# Patient Record
Sex: FEMALE | Race: WHITE | NOT HISPANIC OR LATINO | Employment: OTHER | URBAN - METROPOLITAN AREA
[De-identification: names, ages, dates, MRNs, and addresses within clinical notes are randomized per-mention and may not be internally consistent; named-entity substitution may affect disease eponyms.]

---

## 2017-05-11 ENCOUNTER — HOSPITAL ENCOUNTER (OUTPATIENT)
Dept: CARDIOLOGY | Facility: CLINIC | Age: 57
Discharge: HOME | End: 2017-05-11
Attending: INTERNAL MEDICINE | Admitting: INTERNAL MEDICINE
Payer: COMMERCIAL

## 2017-05-11 DIAGNOSIS — I35.1 AORTIC REGURGITATION: ICD-10-CM

## 2017-05-11 PROCEDURE — 93306 TTE W/DOPPLER COMPLETE: CPT | Mod: 26 | Performed by: INTERNAL MEDICINE

## 2017-05-11 PROCEDURE — 93306 TTE W/DOPPLER COMPLETE: CPT

## 2017-05-18 ENCOUNTER — OFFICE VISIT (OUTPATIENT)
Dept: CARDIOLOGY | Facility: CLINIC | Age: 57
End: 2017-05-18
Attending: INTERNAL MEDICINE
Payer: COMMERCIAL

## 2017-05-18 VITALS
SYSTOLIC BLOOD PRESSURE: 118 MMHG | WEIGHT: 176 LBS | HEART RATE: 72 BPM | BODY MASS INDEX: 28.28 KG/M2 | DIASTOLIC BLOOD PRESSURE: 62 MMHG | HEIGHT: 66 IN

## 2017-05-18 DIAGNOSIS — I35.1 AORTIC VALVE INSUFFICIENCY, UNSPECIFIED ETIOLOGY: ICD-10-CM

## 2017-05-18 PROCEDURE — 99213 OFFICE O/P EST LOW 20 MIN: CPT | Performed by: INTERNAL MEDICINE

## 2017-05-18 NOTE — PROGRESS NOTES
HISTORY OF PRESENT ILLNESS:  Ms. Ritter is a very pleasant 56-year-old female who came to see me in 2015 with aortic valve disease.  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  She also has mild to moderate ascending aortic dilatation.  She continues to remain completely symptom-free.  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  She has no chest pain, chest pressure, shortness of breath.  She feels great with no limitations.  She does realize she has put on some weight and the plan is that now in summer time and [employer], to get into more by walking and biking.      She denies any dyspnea, palpitations, PND, orthopnea or pedal edema.      Review of her echocardiogram shows normal left ventricular size and function and stable 2+ aortic regurgitation.      ASSESSMENT AND PLAN:  It is a delight seeing Ms. Ritter back in clinic appearing stable from a cardiovascular standpoint.  She has not progressed with regards to her symptoms or her echocardiographic findings.  I will back off frequency of visits to every 2 years and we will see her back at that time with a repeat echocardiogram.         RENNY LOONEY MD [facility]             D: 2017 08:54   T: 2017 17:08   MT: [id number]      Name:     CADE RITTER   MRN:      [id number]        Account:      [id number]   :      1960           Service Date: 2017      Document: [id number]

## 2017-05-18 NOTE — LETTER
5/18/2017    [facility]   [address]  [place] [address]    RE: Diane Ritter       Dear Colleague,    I had the pleasure of seeing Diane Ritter in the [facility].    Ms. Ritter is a very pleasant 56-year-old female who came to see me in 2015 with aortic valve disease.  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  She also has mild to moderate ascending aortic dilatation.  She continues to remain completely symptom-free.  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  She has no chest pain, chest pressure, shortness of breath.  She feels great with no limitations.  She does realize she has put on some weight and the plan is that now in summer time and jail, to get into more by walking and biking.      She denies any dyspnea, palpitations, PND, orthopnea or pedal edema.      Review of her echocardiogram shows normal left ventricular size and function and stable 2+ aortic regurgitation.     Outpatient Encounter Prescriptions as of 5/18/2017   Medication Sig Dispense Refill     Cholecalciferol (VITAMIN D) 1000 UNITS capsule Take 1 capsule by mouth daily       aspirin 81 MG tablet Take by mouth daily       EPINEPHrine (EPIPEN) 0.3 MG/0.3ML injection Inject 0.3 mg into the muscle once as needed for anaphylaxis       lisinopril (PRINIVIL,ZESTRIL) 10 MG tablet Take 10 mg by mouth daily.       FISH OIL        Multiple Vitamin (DAILY MULTIVITAMIN PO) Take by mouth daily        No facility-administered encounter medications on file as of 5/18/2017.       ASSESSMENT AND PLAN:  It is a delight seeing Ms. Ritter back in clinic appearing stable from a cardiovascular standpoint.  She has not progressed with regards to her symptoms or her echocardiographic findings.  I will back off frequency of visits to every 2 years and we will see her back at that time with a  repeat echocardiogram.     Again, thank you for allowing me to participate in the care of your patient.      Sincerely,    RENNY LOONEY MD     [facility]

## 2017-05-18 NOTE — MR AVS SNAPSHOT
After Visit Summary   5/18/2017    Diane Ritter    MRN: [id number]           Patient Information     Date Of Birth          1960        Visit Information        Provider Department      5/18/2017 8:45 AM Jasmeet John MD [facility]        Today's Diagnoses     Aortic valve insufficiency, unspecified etiology           Follow-ups after your visit        Additional Services     Follow-Up with Cardiologist                 Future tests that were ordered for you today     Open Future Orders        Priority Expected Expires Ordered    Follow-Up with Cardiologist Routine 5/18/2019 6/7/2019 5/18/2017    Echocardiogram Routine 5/18/2019 6/7/2019 5/18/2017            Who to contact     If you have questions or need follow up information about today's clinic visit or your schedule please contact [facility] directly at [phone].  Normal or non-critical lab and imaging results will be communicated to you by MyChart, letter or phone within 4 business days after the clinic has received the results. If you do not hear from us within 7 days, please contact the clinic through [email or web address] or phone. If you have a critical or abnormal lab result, we will notify you by phone as soon as possible.  Submit refill requests through [employer] or call your pharmacy and they will forward the refill request to us. Please allow 3 business days for your refill to be completed.          Additional Information About Your Visit        MyChart Information     [employer] gives you secure access to your electronic health record. If you see a primary care provider, you can also send messages to your care team and make appointments. If you have questions, please call your primary care clinic.  If you do not have a primary care provider, please call [phone] and they will assist you.        Care EveryWhere ID     This is your Care EveryWhere  "ID. This could be used by other organizations to access your [place] medical records  [id number]        Your Vitals Were     Pulse Height BMI (Body Mass Index)             72 1.676 m (5' 6\") 28.41 kg/m2          Blood Pressure from Last 3 Encounters:   05/18/17 118/62   05/31/15 113/78   04/10/15 101/65    Weight from Last 3 Encounters:   05/18/17 79.8 kg (176 lb)   04/10/15 74.4 kg (164 lb)   03/27/15 75.6 kg (166 lb 11.2 oz)              We Performed the Following     Follow-Up with Cardiologist        Primary Care Provider Office Phone # Fax #    Erlinda Mcclain [phone]       [address] [address]        Thank you!     Thank you for choosing [facility] [place]  for your care. Our goal is always to provide you with excellent care. Hearing back from our patients is one way we can continue to improve our services. Please take a few minutes to complete the written survey that you may receive in the mail after your visit with us. Thank you!             Your Updated Medication List - Protect others around you: Learn how to safely use, store and throw away your medicines at www.disposemymeds.org.          This list is accurate as of: 5/18/17  8:54 AM.  Always use your most recent med list.                   Brand Name Dispense Instructions for use    aspirin 81 MG tablet      Take by mouth daily       DAILY MULTIVITAMIN PO      Take by mouth daily       EPINEPHrine 0.3 MG/0.3ML injection      Inject 0.3 mg into the muscle once as needed for anaphylaxis       FISH OIL          lisinopril 10 MG tablet    PRINIVIL/ZESTRIL     Take 10 mg by mouth daily.       vitamin D 1000 UNITS capsule      Take 1 capsule by mouth daily         "

## 2017-05-18 NOTE — PROGRESS NOTES
HPI and Plan:   See dictation    Orders Placed This Encounter   Procedures     Follow-Up with Cardiologist     Echocardiogram     No orders of the defined types were placed in this encounter.    There are no discontinued medications.      Encounter Diagnosis   Name Primary?     Aortic valve insufficiency, unspecified etiology        CURRENT MEDICATIONS:  Current Outpatient Prescriptions   Medication Sig Dispense Refill     Cholecalciferol (VITAMIN D) 1000 UNITS capsule Take 1 capsule by mouth daily       aspirin 81 MG tablet Take by mouth daily       EPINEPHrine (EPIPEN) 0.3 MG/0.3ML injection Inject 0.3 mg into the muscle once as needed for anaphylaxis       lisinopril (PRINIVIL,ZESTRIL) 10 MG tablet Take 10 mg by mouth daily.       FISH OIL        Multiple Vitamin (DAILY MULTIVITAMIN PO) Take by mouth daily          ALLERGIES     Allergies   Allergen Reactions     Compazine      Shellfish Allergy      Sulfa Drugs        PAST MEDICAL HISTORY:  Past Medical History:   Diagnosis Date     Aortic valve disorder-2-3 central aortic regugitation 3/26/2015     BHUMIKA (obstructive sleep apnea) 3/26/2015       PAST SURGICAL HISTORY:  Past Surgical History:   Procedure Laterality Date     APPENDECTOMY       GYN SURGERY      ovarian cysts removed      HYSTERECTOMY         FAMILY HISTORY:  Family History   Problem Relation Age of Onset     HEART DISEASE Mother      mitral valve disorder??     Rheumatoid Arthritis Mother      Other - See Comments Father      abdominal aortic aneurysm     Hypertension Father      Hypertension Brother      DIABETES Brother      DIABETES Brother        SOCIAL HISTORY:  Social History     Social History     Marital status:      Spouse name: N/A     Number of children: N/A     Years of education: N/A     Social History Main Topics     Smoking status: Never Smoker     Smokeless tobacco: None     Alcohol use No     Drug use: None     Sexual activity: Not Asked     Other Topics Concern     Caffeine  "Concern No     Sleep Concern Yes     sleep apnea, wears oral device     Stress Concern No     Weight Concern Yes     weight loss 15# over last year     Special Diet Yes     low sugar, no processed foods, juicing     Exercise Yes     3 days week, biking, elliptical     Social History Narrative       Review of Systems:  Skin:  Negative     Eyes:  Positive for glasses  ENT:  Negative    Respiratory:  Positive for sleep apnea  Cardiovascular:  Negative;chest pain;lightheadedness;dizziness;syncope or near-syncope;cyanosis;exercise intolerance;fatigue Positive for;palpitations  Gastroenterology: Negative    Genitourinary:  Negative    Musculoskeletal:  Positive for back pain  Neurologic:  Positive for numbness or tingling of feet  Psychiatric:  Negative    Heme/Lymph/Imm:  Positive for    Endocrine:  Negative      Physical Exam:  Vitals: /62  Pulse 72  Ht 1.676 m (5' 6\")  Wt 79.8 kg (176 lb)  BMI 28.41 kg/m2    Constitutional:           Skin:           Head:           Eyes:           ENT:           Neck:           Chest:           Cardiac:                    Abdomen:           Vascular:                                        Extremities and Back:           Neurological:             Recent Lab Results:  LIPID RESULTS:  Lab Results   Component Value Date    CHOL 186 12/04/2008    HDL 60 12/04/2008     12/04/2008    TRIG 54 12/04/2008    CHOLHDLRATIO 3.1 12/04/2008       LIVER ENZYME RESULTS:  No results found for: AST, ALT    CBC RESULTS:  Lab Results   Component Value Date    WBC 3.6 (L) 12/04/2008    RBC 3.95 12/04/2008    HGB 11.9 12/04/2008    HCT 36.1 12/04/2008    MCV 91 12/04/2008    MCH 30.1 12/04/2008    MCHC 33.0 12/04/2008    RDW 12.9 12/04/2008     12/04/2008       BMP RESULTS:  Lab Results   Component Value Date     12/04/2008    POTASSIUM 4.2 12/04/2008    CHLORIDE 106 12/04/2008    CO2 27 12/04/2008    ANIONGAP 8 12/04/2008    GLC 85 12/04/2008    BUN 19 12/04/2008    CR 0.86 " 12/04/2008    GFRESTIMATED 70 12/04/2008    GFRESTBLACK 85 12/04/2008    GRACE 9.9 12/04/2008        A1C RESULTS:  No results found for: A1C    INR RESULTS:  No results found for: INR        CC  Jasmeet John MD   [facility]  [address]  [place] [id number]

## 2017-07-01 ENCOUNTER — HEALTH MAINTENANCE LETTER (OUTPATIENT)
Age: 57
End: 2017-07-01

## 2017-08-03 ENCOUNTER — TRANSFERRED RECORDS (OUTPATIENT)
Dept: HEALTH INFORMATION MANAGEMENT | Facility: CLINIC | Age: 57
End: 2017-08-03

## 2017-09-08 ENCOUNTER — HOSPITAL ENCOUNTER (OUTPATIENT)
Dept: MAMMOGRAPHY | Facility: CLINIC | Age: 57
End: 2017-09-08
Attending: INTERNAL MEDICINE
Payer: COMMERCIAL

## 2017-09-08 ENCOUNTER — HOSPITAL ENCOUNTER (OUTPATIENT)
Dept: ULTRASOUND IMAGING | Facility: CLINIC | Age: 57
Discharge: HOME | End: 2017-09-08
Attending: INTERNAL MEDICINE | Admitting: INTERNAL MEDICINE
Payer: COMMERCIAL

## 2017-09-08 DIAGNOSIS — Z12.31 VISIT FOR SCREENING MAMMOGRAM: ICD-10-CM

## 2017-09-08 DIAGNOSIS — E04.1 COLLOID THYROID NODULE: ICD-10-CM

## 2017-09-08 PROCEDURE — 77063 BREAST TOMOSYNTHESIS BI: CPT

## 2017-09-08 PROCEDURE — 76536 US EXAM OF HEAD AND NECK: CPT

## 2018-09-06 ENCOUNTER — TELEPHONE (OUTPATIENT)
Dept: FAMILY MEDICINE | Facility: CLINIC | Age: 58
End: 2018-09-06

## 2018-09-06 NOTE — TELEPHONE ENCOUNTER
Reason for Call:  Other appointment    Detailed comments: Pt's  sees dr Hernandez and she is wondering if Dr. Marcano could be her PCP so they both have their primary care in the same clinic. She would be very grateful    Phone Number Patient can be reached at: Cell number on file:    Telephone Information:   Mobile [phone]       Best Time: Any time  Can we leave a detailed message on this number? YES    Call taken on 9/6/2018 at 2:07 PM by Skyla Mota

## 2018-09-17 ENCOUNTER — TELEPHONE (OUTPATIENT)
Dept: FAMILY MEDICINE | Facility: CLINIC | Age: 58
End: 2018-09-17

## 2018-09-17 NOTE — TELEPHONE ENCOUNTER
Placed call to patient in regards to appointment scheduling with new PCP okayed by Dr. Hernandez. Patient scheduled on October 8, 2018 at 1330 with Dr. Hernandez.    Glenn Andersen CMA on 9/17/2018 at 5:39 PM

## 2018-09-17 NOTE — TELEPHONE ENCOUNTER
PT is trying to become a Dr. Hernandez pt.  Her , Cleveland Ritter is a pt of Dr. Blum.  The couple would like to see the same pcp.  At Sanjeev's last appointment, she was told by Dr. Hernandez that he would take her on, however there is no confirmation.  Please confirm so that pt can make an appt as soon as possible.      Diane can be reached at [phone] and it is ok to leave .

## 2018-09-21 ENCOUNTER — HOSPITAL ENCOUNTER (OUTPATIENT)
Dept: MAMMOGRAPHY | Facility: CLINIC | Age: 58
Discharge: HOME | End: 2018-09-21
Attending: INTERNAL MEDICINE | Admitting: INTERNAL MEDICINE
Payer: COMMERCIAL

## 2018-09-21 ENCOUNTER — OFFICE VISIT (OUTPATIENT)
Dept: FAMILY MEDICINE | Facility: CLINIC | Age: 58
End: 2018-09-21
Payer: COMMERCIAL

## 2018-09-21 ENCOUNTER — TELEPHONE (OUTPATIENT)
Dept: FAMILY MEDICINE | Facility: CLINIC | Age: 58
End: 2018-09-21

## 2018-09-21 VITALS
SYSTOLIC BLOOD PRESSURE: 129 MMHG | OXYGEN SATURATION: 99 % | DIASTOLIC BLOOD PRESSURE: 75 MMHG | HEIGHT: 66 IN | TEMPERATURE: 97.7 F | HEART RATE: 87 BPM | WEIGHT: 151 LBS | BODY MASS INDEX: 24.27 KG/M2

## 2018-09-21 DIAGNOSIS — Z13.220 LIPID SCREENING: ICD-10-CM

## 2018-09-21 DIAGNOSIS — Z85.41 HISTORY OF CERVICAL CANCER: ICD-10-CM

## 2018-09-21 DIAGNOSIS — Z00.00 ROUTINE HISTORY AND PHYSICAL EXAMINATION OF ADULT: Primary | ICD-10-CM

## 2018-09-21 DIAGNOSIS — Z23 NEED FOR PROPHYLACTIC VACCINATION AND INOCULATION AGAINST INFLUENZA: ICD-10-CM

## 2018-09-21 DIAGNOSIS — L71.9 ROSACEA: ICD-10-CM

## 2018-09-21 DIAGNOSIS — Z86.0100 HISTORY OF COLONIC POLYPS: ICD-10-CM

## 2018-09-21 DIAGNOSIS — Z12.31 VISIT FOR SCREENING MAMMOGRAM: ICD-10-CM

## 2018-09-21 DIAGNOSIS — Z13.29 SCREENING FOR THYROID DISORDER: ICD-10-CM

## 2018-09-21 DIAGNOSIS — Z86.19 HISTORY OF ESBL E. COLI INFECTION: ICD-10-CM

## 2018-09-21 DIAGNOSIS — E04.1 THYROID NODULE: ICD-10-CM

## 2018-09-21 DIAGNOSIS — I35.1 NONRHEUMATIC AORTIC VALVE INSUFFICIENCY: ICD-10-CM

## 2018-09-21 DIAGNOSIS — K76.9 LIVER LESION: ICD-10-CM

## 2018-09-21 DIAGNOSIS — I87.2 VENOUS (PERIPHERAL) INSUFFICIENCY: ICD-10-CM

## 2018-09-21 DIAGNOSIS — Z79.899 MEDICATION MANAGEMENT: ICD-10-CM

## 2018-09-21 LAB
ALBUMIN SERPL-MCNC: 4.1 G/DL (ref 3.4–5)
ALP SERPL-CCNC: 62 U/L (ref 40–150)
ALT SERPL W P-5'-P-CCNC: 38 U/L (ref 0–50)
ANION GAP SERPL CALCULATED.3IONS-SCNC: 8 MMOL/L (ref 3–14)
AST SERPL W P-5'-P-CCNC: 27 U/L (ref 0–45)
BILIRUB SERPL-MCNC: 0.6 MG/DL (ref 0.2–1.3)
BUN SERPL-MCNC: 17 MG/DL (ref 7–30)
CALCIUM SERPL-MCNC: 10 MG/DL (ref 8.5–10.1)
CHLORIDE SERPL-SCNC: 98 MMOL/L (ref 94–109)
CHOLEST SERPL-MCNC: 167 MG/DL
CO2 SERPL-SCNC: 30 MMOL/L (ref 20–32)
CREAT SERPL-MCNC: 0.74 MG/DL (ref 0.52–1.04)
GFR SERPL CREATININE-BSD FRML MDRD: 80 ML/MIN/1.7M2
GLUCOSE SERPL-MCNC: 93 MG/DL (ref 70–99)
HDLC SERPL-MCNC: 63 MG/DL
LDLC SERPL CALC-MCNC: 92 MG/DL
NONHDLC SERPL-MCNC: 104 MG/DL
POTASSIUM SERPL-SCNC: 4.7 MMOL/L (ref 3.4–5.3)
PROT SERPL-MCNC: 7.7 G/DL (ref 6.8–8.8)
SODIUM SERPL-SCNC: 136 MMOL/L (ref 133–144)
T4 FREE SERPL-MCNC: 0.98 NG/DL (ref 0.76–1.46)
TRIGL SERPL-MCNC: 62 MG/DL
TSH SERPL DL<=0.005 MIU/L-ACNC: 4.46 MU/L (ref 0.4–4)

## 2018-09-21 PROCEDURE — 99386 PREV VISIT NEW AGE 40-64: CPT | Mod: 25 | Performed by: INTERNAL MEDICINE

## 2018-09-21 PROCEDURE — 77067 SCR MAMMO BI INCL CAD: CPT

## 2018-09-21 PROCEDURE — 87624 HPV HI-RISK TYP POOLED RSLT: CPT | Performed by: INTERNAL MEDICINE

## 2018-09-21 PROCEDURE — 90471 IMMUNIZATION ADMIN: CPT | Performed by: INTERNAL MEDICINE

## 2018-09-21 PROCEDURE — 80053 COMPREHEN METABOLIC PANEL: CPT | Performed by: INTERNAL MEDICINE

## 2018-09-21 PROCEDURE — G0145 SCR C/V CYTO,THINLAYER,RESCR: HCPCS | Performed by: INTERNAL MEDICINE

## 2018-09-21 PROCEDURE — 84439 ASSAY OF FREE THYROXINE: CPT | Performed by: INTERNAL MEDICINE

## 2018-09-21 PROCEDURE — 99213 OFFICE O/P EST LOW 20 MIN: CPT | Mod: 25 | Performed by: INTERNAL MEDICINE

## 2018-09-21 PROCEDURE — 90686 IIV4 VACC NO PRSV 0.5 ML IM: CPT | Performed by: INTERNAL MEDICINE

## 2018-09-21 PROCEDURE — 84443 ASSAY THYROID STIM HORMONE: CPT | Performed by: INTERNAL MEDICINE

## 2018-09-21 PROCEDURE — 36415 COLL VENOUS BLD VENIPUNCTURE: CPT | Performed by: INTERNAL MEDICINE

## 2018-09-21 PROCEDURE — 80061 LIPID PANEL: CPT | Performed by: INTERNAL MEDICINE

## 2018-09-21 RX ORDER — LISINOPRIL 10 MG/1
10 TABLET ORAL DAILY
Qty: 90 TABLET | Refills: 3 | Status: SHIPPED | OUTPATIENT
Start: 2018-09-21 | End: 2019-09-02

## 2018-09-21 NOTE — TELEPHONE ENCOUNTER
Patient will be calling back to notify us which express script to send her prescriptions to.   Serena Gore CMA

## 2018-09-21 NOTE — Clinical Note
Colonoscopy was done on 11/30/2015 Polyps were found and repeat colonoscopy in 5 year was recommended at that time

## 2018-09-21 NOTE — Clinical Note
Please make correction in previous note Colonoscopy was normal  But due to history polyp in the past it was recommended to repeat in 5 years

## 2018-09-21 NOTE — PROGRESS NOTES
SUBJECTIVE:   CC: Diane Ritter is an 58 year old woman who presents for preventive health visit.     Healthy Habits:    Do you get at least three servings of calcium containing foods daily (dairy, green leafy vegetables, etc.)? yes    Amount of exercise or daily activities, outside of work: 5 day(s) per week    Problems taking medications regularly No    Medication side effects: No    Have you had an eye exam in the past two years? yes    Do you see a dentist twice per year? Yes    Do you have sleep apnea, excessive snoring or daytime drowsiness?yes, uses Cpap     Today's PHQ-2 Score:   PHQ-2 ( 1999 Pf[employer]) 9/21/2018   Q1: Little interest or pleasure in doing things 0   Q2: Feeling down, depressed or hopeless 0   PHQ-2 Score 0     Abuse: Current or Past(Physical, Sexual or Emotional)- No  Do you feel safe in your environment - Yes    Social History   Substance Use Topics     Smoking status: Never Smoker     Smokeless tobacco: Never Used     Alcohol use No     If you drink alcohol do you typically have >3 drinks per day or >7 drinks per week? No                     Reviewed orders with patient.  Reviewed health maintenance and updated orders accordingly - Yes  Labs reviewed in EPIC  Patient Active Problem List   Diagnosis     Aortic valve disorder-2-3 central aortic regugitation     BHUMIKA (obstructive sleep apnea)     Thyroid nodule     Nonrheumatic aortic valve insufficiency     Liver lesion     Rosacea     History of ESBL E. coli infection     Venous (peripheral) insufficiency     History of colonic polyps     Past Surgical History:   Procedure Laterality Date     APPENDECTOMY       GYN SURGERY      ovarian cysts removed      HYSTERECTOMY VAGINAL      cervical cancer     ORTHOPEDIC SURGERY  2010    hand surgery on right side       Social History   Substance Use Topics     Smoking status: Never Smoker     Smokeless tobacco: Never Used     Alcohol use No     Family History   Problem Relation Age of Onset      HEART DISEASE Mother      mitral valve disorder??     Rheumatoid Arthritis Mother      Other - See Comments Father      abdominal aortic aneurysm     Hypertension Father      Colon Polyps Father      Diabetes Brother      Pancreatic Cancer Brother      Diabetes Brother      Hypertension Brother      Breast Cancer No family hx of          Current Outpatient Prescriptions   Medication Sig Dispense Refill     aspirin 81 MG tablet Take by mouth every other day        Cholecalciferol (VITAMIN D) 1000 UNITS capsule Take 1 capsule by mouth daily In winter       EPINEPHrine (EPIPEN) 0.3 MG/0.3ML injection Inject 0.3 mg into the muscle once as needed for anaphylaxis       FISH OIL 1,000 mg daily        lisinopril (PRINIVIL,ZESTRIL) 10 MG tablet Take 10 mg by mouth daily.       Multiple Vitamin (DAILY MULTIVITAMIN PO) Take by mouth daily        Allergies   Allergen Reactions     Compazine Other (See Comments)     Facial tics     Shellfish Allergy Nausea and Vomiting and Rash     Sulfa Drugs Rash     Patient over age 50, mutual decision to screen reflected in health maintenance.    Pertinent mammograms are reviewed under the imaging tab.  History of abnormal Pap smear: NO - age 30-65 PAP every 3 years  PAP / HPV 11/13/2009   PAP NIL     Reviewed and updated as needed this visit by clinical staff  Tobacco  Allergies  Meds  Soc Hx      Reviewed and updated as needed this visit by Provider     ROS:  CONSTITUTIONAL: NEGATIVE for fever, chills, change in weight  INTEGUMENTARY/SKIN: NEGATIVE for worrisome rashes, moles or lesions  EYES: NEGATIVE for vision changes or irritation  ENT: NEGATIVE for ear, mouth and throat problems  RESP: NEGATIVE for significant cough or SOB  BREAST: NEGATIVE for masses, tenderness or discharge  CV: NEGATIVE for chest pain, palpitations or peripheral edema  GI: NEGATIVE for nausea, abdominal pain, heartburn, or change in bowel habits  : NEGATIVE for unusual urinary or vaginal symptoms. No vaginal  "bleeding.  MUSCULOSKELETAL: NEGATIVE for significant arthralgias or myalgia  NEURO: NEGATIVE for weakness, dizziness or paresthesias  PSYCHIATRIC: NEGATIVE for changes in mood or affect     This document serves as a record of the services and decisions personally performed and made by Angelica Marcano MD. It was created on her behalf by Marcia Mark, a trained medical scribe. The creation of this document is based on the provider's statements to the medical scribe.  Marcia Mark 10:25 AM September 21, 2018  OBJECTIVE:   /75 (Cuff Size: Adult Regular)  Pulse 87  Temp 97.7  F (36.5  C) (Oral)  Ht 1.664 m (5' 5.5\")  Wt 68.5 kg (151 lb)  SpO2 99%  Breastfeeding? No  BMI 24.75 kg/m2     GENERAL APPEARANCE: healthy, alert and no distress  EYES: Eyes grossly normal to inspection, PERRL and conjunctivae and sclerae normal  HENT: ear canals and TM's normal, nose and mouth without ulcers or lesions, oropharynx clear and oral mucous membranes moist  NECK: no adenopathy, no asymmetry, masses, or scars and thyroid normal to palpation  RESP: lungs clear to auscultation - no rales, rhonchi or wheezes  BREAST: normal without masses, tenderness or nipple discharge and no palpable axillary masses or adenopathy  CV: Faint systolic murmur in aortic area, regular rate and rhythm, normal S1 S2, no S3 or S4, no click or rub,slight  peripheral edema and peripheral pulses strong  ABDOMEN: soft, nontender, no hepatosplenomegaly, no masses and bowel sounds normal   (female): PAP smear done in office today, normal female external genitalia, normal urethral meatus, vaginal mucosal atrophy noted,    Pap test performed.  MS: no musculoskeletal defects are noted and gait is age appropriate without ataxia  SKIN: scar in RL quadrant from oophorectomy, no suspicious lesions or rashes  NEURO: Normal strength and tone, sensory exam grossly normal, mentation intact and speech normal  PSYCH: mentation appears normal and affect " normal/bright    Diagnostic Test Results:  No results found for this or any previous visit (from the past 24 hour(s)).    Reviewed and discussed CT chest done on 04/02/2015  ASSESSMENT/PLAN:   Diane was seen today for new patient and physical.    Diagnoses and all orders for this visit:    Routine history and physical examination of adult  Patient is here for a wellness visit  Up to date for colonoscopy  Up to date for immunizations  Patient has sleep apnea   States she was diagnosed about 10-12 years ago  She uses CPAP  Reports she used an oral device 2 years ago  Information about health preventative measurements given in office today    Lipid screening  -     Lipid panel reflex to direct LDL Fasting    History of cervical cancer  -     Pap imaged thin layer screen with HPV - recommended age 30 - 65 years (select HPV order below)  -     HPV High Risk Types DNA Cervical  Patient has a history of cervical cancer  Due for PAP smear  PAP smear performed in office today  Due for a PAP smear every 3 years if HPV is negative    Nonrheumatic aortic valve insufficiency  Patient has aortic valve insufficiency  Follows up with Dr. John, a cardiologist    Visit for screening mammogram  -     Cancel: *MA Screening Digital Bilateral; Future  -     MA Screen Bilateral w/Ayaz; Future  Due for mammogram  She will schedule at her convenience    Screening for thyroid disorder  -     TSH with free T4 reflex    Thyroid nodule  Patient had a thyroid nodule on right side  Had done an US in past that showed it was of no concern    Venous (peripheral) insufficiency  Wears compression soaks    Liver lesion  Comments:  seen on CT in 2015  Orders:  -     US Abdomen Limited; Future    Rosacea  Patient has history of rosacea  She has not tried any medications  Endorses she is controlling her rosacea well without medications    History of ESBL E. coli infection    Medication management  -     Comprehensive metabolic panel    History of colonic  "polyps  Patient reports her last colonoscopy done on 11/30/2015   Past history of colon polyp  Father also had colon polyps in past  Up to date with colonoscopy  She is due for a colonoscopy every 5 years    Other orders  -     Cancel: GLUCOSE    Educated about new shingles vaccine  Flu shot done in office today     COUNSELING:   Reviewed preventive health counseling, as reflected in patient instructions       Regular exercise       Healthy diet/nutrition    BP Readings from Last 1 Encounters:   09/21/18 129/75     Estimated body mass index is 24.75 kg/(m^2) as calculated from the following:    Height as of this encounter: 1.664 m (5' 5.5\").    Weight as of this encounter: 68.5 kg (151 lb).     reports that she has never smoked. She has never used smokeless tobacco.    Patient Instructions   Mammogram is done in [address]  Please call the following number to make appointment at your earliest convenience:  [phone]  Check with our pharmacy located in [address] about your insurance company coverage for new Shingles vaccine, which is now available  It's called SHINGRIX and is a series of two shots, 2-6 months apart  It is considered more than 90% effective  Follow up in   Seek sooner medical attention if there is any worsening of symptoms or problems    Counseling Resources:  ATP IV Guidelines  Pooled Cohorts Equation Calculator  Breast Cancer Risk Calculator  FRAX Risk Assessment  ICSI Preventive Guidelines  Dietary Guidelines for Americans, 2010  USDA's MyPlate  ASA Prophylaxis  Lung CA Screening    The information in this document, created by the medical scribe for me, accurately reflects the services I personally performed and the decisions made by me. I have reviewed and approved this document for accuracy prior to leaving the patient care area.  September 21, 2018 11:02 AM    Angelica Marcano MD  [facility]    Injectable Influenza Immunization Documentation    1.  Is the person to be vaccinated " sick today?   No    2. Does the person to be vaccinated have an allergy to a component   of the vaccine?   No  Egg Allergy Algorithm Link    3. Has the person to be vaccinated ever had a serious reaction   to influenza vaccine in the past?   No    4. Has the person to be vaccinated ever had Guillain-Barré syndrome?   No    Form completed by Serena Gore CMA   Prior to injection verified patient identity using patient's name and date of birth.  Due to injection administration, patient instructed to remain in clinic for 15 minutes  afterwards, and to report any adverse reaction to me immediately.

## 2018-09-21 NOTE — MR AVS SNAPSHOT
After Visit Summary   9/21/2018    Diane Ritter    MRN: [id number]           Patient Information     Date Of Birth          1960        Visit Information        Provider Department      9/21/2018 10:00 AM Angelica Marcano MD [facility]        Today's Diagnoses     Routine history and physical examination of adult    -  1    Lipid screening        History of cervical cancer        Nonrheumatic aortic valve insufficiency        Visit for screening mammogram        Screening for thyroid disorder        Thyroid nodule        Venous (peripheral) insufficiency        Liver lesion        Rosacea        History of ESBL E. coli infection        Medication management        History of colonic polyps          Care Instructions      Preventive Health Recommendations  Female Ages 50 - 64    Yearly exam: See your health care provider every year in order to  o Review health changes.   o Discuss preventive care.    o Review your medicines if your doctor has prescribed any.      Get a Pap test every three years (unless you have an abnormal result and your provider advises testing more often).    If you get Pap tests with HPV test, you only need to test every 5 years, unless you have an abnormal result.     You do not need a Pap test if your uterus was removed (hysterectomy) and you have not had cancer.    You should be tested each year for STDs (sexually transmitted diseases) if you're at risk.     Have a mammogram every 1 to 2 years.    Have a colonoscopy at age 50, or have a yearly FIT test (stool test). These exams screen for colon cancer.      Have a cholesterol test every 5 years, or more often if advised.    Have a diabetes test (fasting glucose) every three years. If you are at risk for diabetes, you should have this test more often.     If you are at risk for osteoporosis (brittle bone disease), think about having a bone density scan (DEXA).    Shots: Get a flu shot each year. Get a  tetanus shot every 10 years.    Nutrition:     Eat at least 5 servings of fruits and vegetables each day.    Eat whole-grain bread, whole-wheat pasta and brown rice instead of white grains and rice.    Get adequate Calcium and Vitamin D.     Lifestyle    Exercise at least 150 minutes a week (30 minutes a day, 5 days a week). This will help you control your weight and prevent disease.    Limit alcohol to one drink per day.    No smoking.     Wear sunscreen to prevent skin cancer.     See your dentist every six months for an exam and cleaning.    See your eye doctor every 1 to 2 years.      There is this is a new shingles vaccine available called shingrex  It is a series of 2 shots 2-6 months apart.  Considered more than 90% effective.  Please go to our pharmacy located in [address] to get the  vaccine      Mammogram is done in [address]  Please call the following number to make appointment at your earliest convenience:  [phone]  Check with our pharmacy located in [address] about your insurance company coverage for new Shingles vaccine, which is now available  It's called SHINGRIX and is a series of two shots, 2-6 months apart  It is considered more than 90% effective  Follow up in   Seek sooner medical attention if there is any worsening of symptoms or problems              Follow-ups after your visit        Follow-up notes from your care team     Return in about 1 year (around 9/21/2019) for Physical Exam.      Future tests that were ordered for you today     Open Future Orders        Priority Expected Expires Ordered    *MA Screening Digital Bilateral Routine  9/21/2019 9/21/2018    US Abdomen Limited Routine  9/21/2019 9/21/2018            Who to contact     If you have questions or need follow up information about today's clinic visit or your schedule please contact [facility] directly at [phone].  Normal or non-critical lab and imaging results will be communicated to you by MyChart, letter  "or phone within 4 business days after the clinic has received the results. If you do not hear from us within 7 days, please contact the clinic through [employer] or phone. If you have a critical or abnormal lab result, we will notify you by phone as soon as possible.  Submit refill requests through [employer] or call your pharmacy and they will forward the refill request to us. Please allow 3 business days for your refill to be completed.          Additional Information About Your Visit        [employer] Information     [employer] gives you secure access to your electronic health record. If you see a primary care provider, you can also send messages to your care team and make appointments. If you have questions, please call your primary care clinic.  If you do not have a primary care provider, please call [phone] and they will assist you.        Care EveryWhere ID     This is your Care EveryWhere ID. This could be used by other organizations to access your [facility] medical records  [id number]        Your Vitals Were     Pulse Temperature Height Pulse Oximetry Breastfeeding? BMI (Body Mass Index)    87 97.7  F (36.5  C) (Oral) 5' 5.5\" (1.664 m) 99% No 24.75 kg/m2       Blood Pressure from Last 3 Encounters:   09/21/18 129/75   05/18/17 118/62   05/31/15 113/78    Weight from Last 3 Encounters:   09/21/18 151 lb (68.5 kg)   05/18/17 176 lb (79.8 kg)   04/10/15 164 lb (74.4 kg)              We Performed the Following     Comprehensive metabolic panel     HPV High Risk Types DNA Cervical     Lipid panel reflex to direct LDL Fasting     Pap imaged thin layer screen with HPV - recommended age 30 - 65 years (select HPV order below)     TSH with free T4 reflex        Primary Care Provider Office Phone # Fax #    Erlinda Mcclain [phone]       [facility] [address]  [place] [address]        Equal Access to Services     BERE LIRIANO AH: Keara Guzman, calos vasquez, qaybta em " fredo shinelio pascualgregorio brownaan ah. [facility] [phone].    ATENCIÓN: Si habla meka, tiene a [place] disposición servicios gratuitos de asistencia lingüística. Fred al [phone].    We comply with applicable federal civil rights laws and [place] laws. We do not discriminate on the basis of race, color, national origin, age, disability, sex, sexual orientation, or gender identity.            Thank you!     Thank you for choosing [facility]  for your care. Our goal is always to provide you with excellent care. Hearing back from our patients is one way we can continue to improve our services. Please take a few minutes to complete the written survey that you may receive in the mail after your visit with us. Thank you!             Your Updated Medication List - Protect others around you: Learn how to safely use, store and throw away your medicines at www.disposemymeds.org.          This list is accurate as of 9/21/18 10:59 AM.  Always use your most recent med list.                   Brand Name Dispense Instructions for use Diagnosis    aspirin 81 MG tablet      Take by mouth every other day        DAILY MULTIVITAMIN PO      Take by mouth daily        EPINEPHrine 0.3 MG/0.3ML injection 2-pack    EPIPEN/ADRENACLICK/or ANY BX GENERIC EQUIV     Inject 0.3 mg into the muscle once as needed for anaphylaxis        FISH OIL      1,000 mg daily        lisinopril 10 MG tablet    PRINIVIL/ZESTRIL     Take 10 mg by mouth daily.        vitamin D 1000 units capsule      Take 1 capsule by mouth daily In winter

## 2018-09-21 NOTE — PATIENT INSTRUCTIONS
Preventive Health Recommendations  Female Ages 50 - 64    Yearly exam: See your health care provider every year in order to  o Review health changes.   o Discuss preventive care.    o Review your medicines if your doctor has prescribed any.      Get a Pap test every three years (unless you have an abnormal result and your provider advises testing more often).    If you get Pap tests with HPV test, you only need to test every 5 years, unless you have an abnormal result.     You do not need a Pap test if your uterus was removed (hysterectomy) and you have not had cancer.    You should be tested each year for STDs (sexually transmitted diseases) if you're at risk.     Have a mammogram every 1 to 2 years.    Have a colonoscopy at age 50, or have a yearly FIT test (stool test). These exams screen for colon cancer.      Have a cholesterol test every 5 years, or more often if advised.    Have a diabetes test (fasting glucose) every three years. If you are at risk for diabetes, you should have this test more often.     If you are at risk for osteoporosis (brittle bone disease), think about having a bone density scan (DEXA).    Shots: Get a flu shot each year. Get a tetanus shot every 10 years.    Nutrition:     Eat at least 5 servings of fruits and vegetables each day.    Eat whole-grain bread, whole-wheat pasta and brown rice instead of white grains and rice.    Get adequate Calcium and Vitamin D.     Lifestyle    Exercise at least 150 minutes a week (30 minutes a day, 5 days a week). This will help you control your weight and prevent disease.    Limit alcohol to one drink per day.    No smoking.     Wear sunscreen to prevent skin cancer.     See your dentist every six months for an exam and cleaning.    See your eye doctor every 1 to 2 years.      There is this is a new shingles vaccine available called shingrex  It is a series of 2 shots 2-6 months apart.  Considered more than 90% effective.  Please go to our pharmacy  located in [address] to get the  vaccine      Mammogram is done in [address]  Please call the following number to make appointment at your earliest convenience:  [phone]  Check with our pharmacy located in [address] about your insurance company coverage for new Shingles vaccine, which is now available  It's called SHINGRIX and is a series of two shots, 2-6 months apart  It is considered more than 90% effective  Follow up in   Seek sooner medical attention if there is any worsening of symptoms or problems

## 2018-09-22 DIAGNOSIS — R79.89 ELEVATED TSH: Primary | ICD-10-CM

## 2018-09-22 NOTE — PROGRESS NOTES
Carina Contreras,    This is to inform you regarding your test result.    Mammogram result is satisfactory .  Recommendation: annual mammography      Sincerely,      Dr.Nasima Jeet MD,FACP

## 2018-09-23 NOTE — PROGRESS NOTES
Carina Contreras,    This is to inform you regarding your test result.    TSH which is thyroid hormone is elevated but free T 4 level is normal  You do not need medication at this point.  But we need to check your level again in 4 months.  Please make lab only appointment in 4 months.  Your total cholesterol is normal.  HDL which is called good cholesterol is normal.  Your LDL cholesterol is normal.  This is often call bad cholesterol and high levels increase the risk for heart attacks and strokes.  Your triglycerides are normal.  The testing of your blood sugar, kidney function, liver function and electrolytes was normal.      Sincerely,      Dr.Nasima Jeet MD,FACP

## 2018-09-25 LAB
COPATH REPORT: NORMAL
PAP: NORMAL

## 2018-09-26 ENCOUNTER — HOSPITAL ENCOUNTER (OUTPATIENT)
Dept: ULTRASOUND IMAGING | Facility: CLINIC | Age: 58
Discharge: HOME | End: 2018-09-26
Attending: INTERNAL MEDICINE | Admitting: INTERNAL MEDICINE
Payer: COMMERCIAL

## 2018-09-26 DIAGNOSIS — K76.9 LIVER LESION: ICD-10-CM

## 2018-09-26 LAB
FINAL DIAGNOSIS: NORMAL
HPV HR 12 DNA CVX QL NAA+PROBE: NEGATIVE
HPV16 DNA SPEC QL NAA+PROBE: NEGATIVE
HPV18 DNA SPEC QL NAA+PROBE: NEGATIVE
SPECIMEN DESCRIPTION: NORMAL
SPECIMEN SOURCE CVX/VAG CYTO: NORMAL

## 2018-09-26 PROCEDURE — 76705 ECHO EXAM OF ABDOMEN: CPT

## 2018-09-26 NOTE — PROGRESS NOTES
Carina Contreras,    This is to inform you regarding your test result.    Your ultrasound result is satisfactory.  It shows cysts in liver which is considered benign finding.    Sincerely,      Dr.Nasima Jeet MD,FACP

## 2018-10-01 DIAGNOSIS — Z12.72 SCREENING FOR MALIGNANT NEOPLASM OF VAGINA AFTER TOTAL HYSTERECTOMY: ICD-10-CM

## 2018-10-01 DIAGNOSIS — Z90.710 SCREENING FOR MALIGNANT NEOPLASM OF VAGINA AFTER TOTAL HYSTERECTOMY: ICD-10-CM

## 2019-06-05 ENCOUNTER — HOSPITAL ENCOUNTER (OUTPATIENT)
Dept: CARDIOLOGY | Facility: CLINIC | Age: 59
Discharge: HOME | End: 2019-06-05
Attending: INTERNAL MEDICINE | Admitting: INTERNAL MEDICINE
Payer: COMMERCIAL

## 2019-06-05 DIAGNOSIS — I35.9 AORTIC VALVE DISEASE: ICD-10-CM

## 2019-06-05 PROCEDURE — 93306 TTE W/DOPPLER COMPLETE: CPT

## 2019-06-05 PROCEDURE — 93306 TTE W/DOPPLER COMPLETE: CPT | Mod: 26 | Performed by: INTERNAL MEDICINE

## 2019-06-13 DIAGNOSIS — R79.89 ELEVATED TSH: ICD-10-CM

## 2019-06-13 LAB
T4 FREE SERPL-MCNC: 0.85 NG/DL (ref 0.76–1.46)
TSH SERPL DL<=0.005 MIU/L-ACNC: 6.47 MU/L (ref 0.4–4)

## 2019-06-13 PROCEDURE — 84443 ASSAY THYROID STIM HORMONE: CPT | Performed by: INTERNAL MEDICINE

## 2019-06-13 PROCEDURE — 84439 ASSAY OF FREE THYROXINE: CPT | Performed by: INTERNAL MEDICINE

## 2019-06-13 PROCEDURE — 36415 COLL VENOUS BLD VENIPUNCTURE: CPT | Performed by: INTERNAL MEDICINE

## 2019-06-13 NOTE — LETTER
[address] [facility]  [address]  [place]  [address]  Tel: [phone]    June 17, 2019    Diane Ritter  [address] [place] [address]        Dear Ms. Ritter,    TSH which is thyroid hormone is elevated but free T 4 level is normal.  Your numbers are slightly getting worse compared to before.  Are you having any symptoms related to underactive thyroid ?  Please let me know so we can discuss treatment option  If you are asymptomatic then I would like to check your level again in 2 months.      Sincerely,      Dr.Nasima Jeet MD,FACP/SML        Enclosure: Lab Results  Results for orders placed or performed in visit on 06/13/19   TSH with free T4 reflex   Result Value Ref Range    TSH 6.47 (H) 0.40 - 4.00 mU/L   T4 free   Result Value Ref Range    T4 Free 0.85 0.76 - 1.46 ng/dL

## 2019-06-17 DIAGNOSIS — R79.89 ELEVATED TSH: Primary | ICD-10-CM

## 2019-06-17 NOTE — RESULT ENCOUNTER NOTE
Please notify patient by sending following letter with copy of test results      Carina Contreras,    This is to inform you regarding your test result.    TSH which is thyroid hormone is elevated but free T 4 level is normal.  Your numbers are slightly getting worse compared to before.  Are you having any symptoms related to underactive thyroid ?  Please let me know so we can discuss treatment option  If you are asymptomatic then I would like to check your level again in 2 months.      Sincerely,      Dr.Nasima Jeet MD,FACP

## 2019-06-17 NOTE — RESULT ENCOUNTER NOTE
Carina Contreras,    This is to inform you regarding your test result.    I spoke to you on phone but sending you a result note also.  You have a family history of thyroid disease  Per your report your sister has Hashimoto's  Your TSH numbers are going up and free T4 is on low end of normal  Will repeat test in 1 month along with TPO antibodies  If TPO antibodies are positive and your numbers istarts going up we will consider putting you on thyroid medication  Please make lab only appointment    Sincerely,      Dr.Nasima Jeet MD,FACP

## 2019-06-18 ENCOUNTER — OFFICE VISIT (OUTPATIENT)
Dept: CARDIOLOGY | Facility: CLINIC | Age: 59
End: 2019-06-18
Payer: COMMERCIAL

## 2019-06-18 VITALS
DIASTOLIC BLOOD PRESSURE: 73 MMHG | HEIGHT: 66 IN | WEIGHT: 153 LBS | SYSTOLIC BLOOD PRESSURE: 128 MMHG | HEART RATE: 63 BPM | BODY MASS INDEX: 24.59 KG/M2

## 2019-06-18 DIAGNOSIS — I35.9 AORTIC VALVE DISEASE: Primary | ICD-10-CM

## 2019-06-18 PROCEDURE — 99213 OFFICE O/P EST LOW 20 MIN: CPT | Performed by: INTERNAL MEDICINE

## 2019-06-18 ASSESSMENT — MIFFLIN-ST. JEOR: SCORE: 1267.81

## 2019-06-18 NOTE — LETTER
2019      Angelica Marcano MD  [address]  [facility] [place] [address]      RE: Cade Ritter       Dear Colleague,    I had the pleasure of seeing Cade Ritter in the [facility].    Service Date: 2019      HISTORY OF PRESENT ILLNESS:  Ms. Ritter is a delightful 59-year-old with a bicuspid aortic valve.  She has had moderate aortic insufficiency and is here for a followup visit.  She continues to be active, biking and is completely asymptomatic.  No chest pain, chest pressure, shortness of breath, lightheadedness, dizziness, near dizziness or syncope.  She has been compliant with her medications including lisinopril 10 mg daily.      Review of her echocardiogram shows stable moderate 2+ aortic insufficiency with normal LV size and function and mild dilatation of the aortic root at 4.2.      ASSESSMENT AND PLAN:  It is a delight seeing Ms. Ritter back in clinic really appearing stable from a cardiovascular standpoint.  She has moderate asymptomatic aortic insufficiency with bicuspid aortic valve.  I think the followup as we discussed in clinic here today is appropriate on an every-2-year basis.  She is to continue to stay active, and I will see her back at that time unless sooner symptoms warrant.      Jasmeet Looney MD, FACC         JASMEET LOONEY MD FACC             D: 2019   T: 2019   MT: GEOFFREY      Name:     CADE RITTER   MRN:      [id number]        Account:      [id number]   :      1960           Service Date: 2019      Document: [id number]         Outpatient Encounter Medications as of 2019   Medication Sig Dispense Refill     aspirin 81 MG tablet Take by mouth every other day        Cholecalciferol (VITAMIN D) 1000 UNITS capsule Take 1 capsule by mouth daily In winter       EPINEPHrine (EPIPEN) 0.3 MG/0.3ML injection Inject 0.3 mg into the muscle once as needed for anaphylaxis       FISH OIL  1,000 mg daily        lisinopril (PRINIVIL/ZESTRIL) 10 MG tablet Take 1 tablet (10 mg) by mouth daily 90 tablet 3     Multiple Vitamin (DAILY MULTIVITAMIN PO) Take by mouth daily        No facility-administered encounter medications on file as of 6/18/2019.        Again, thank you for allowing me to participate in the care of your patient.      Sincerely,    RENNY LOONEY MD     [facility]

## 2019-06-18 NOTE — PROGRESS NOTES
HPI and Plan:   See dictation    No orders of the defined types were placed in this encounter.    No orders of the defined types were placed in this encounter.    There are no discontinued medications.      No diagnosis found.    CURRENT MEDICATIONS:  Current Outpatient Medications   Medication Sig Dispense Refill     aspirin 81 MG tablet Take by mouth every other day        Cholecalciferol (VITAMIN D) 1000 UNITS capsule Take 1 capsule by mouth daily In winter       EPINEPHrine (EPIPEN) 0.3 MG/0.3ML injection Inject 0.3 mg into the muscle once as needed for anaphylaxis       FISH OIL 1,000 mg daily        lisinopril (PRINIVIL/ZESTRIL) 10 MG tablet Take 1 tablet (10 mg) by mouth daily 90 tablet 3     Multiple Vitamin (DAILY MULTIVITAMIN PO) Take by mouth daily          ALLERGIES     Allergies   Allergen Reactions     Compazine Other (See Comments)     Facial tics     Shellfish Allergy Nausea and Vomiting and Rash     Sulfa Drugs Rash       PAST MEDICAL HISTORY:  Past Medical History:   Diagnosis Date     Aortic valve disorder-2-3 central aortic regugitation 3/26/2015     BHUMIKA (obstructive sleep apnea) 3/26/2015     Thyroid nodule      Venous insufficiency        PAST SURGICAL HISTORY:  Past Surgical History:   Procedure Laterality Date     APPENDECTOMY       GYN SURGERY      ovarian cysts removed      HYSTERECTOMY VAGINAL      cervical cancer     ORTHOPEDIC SURGERY  2010    hand surgery on right side       FAMILY HISTORY:  Family History   Problem Relation Age of Onset     Heart Disease Mother         mitral valve disorder??     Rheumatoid Arthritis Mother      Other - See Comments Father         abdominal aortic aneurysm     Hypertension Father      Colon Polyps Father      Diabetes Brother      Pancreatic Cancer Brother      Diabetes Brother      Hypertension Brother      Breast Cancer No family hx of        SOCIAL HISTORY:  Social History     Socioeconomic History     Marital status:      Spouse name: None      Number of children: None     Years of education: None     Highest education level: None   Occupational History     None   Social Needs     Financial resource strain: None     Food insecurity:     Worry: None     Inability: None     Transportation needs:     Medical: None     Non-medical: None   Tobacco Use     Smoking status: Never Smoker     Smokeless tobacco: Never Used   Substance and Sexual Activity     Alcohol use: No     Drug use: No     Sexual activity: Yes     Partners: Male   Lifestyle     Physical activity:     Days per week: None     Minutes per session: None     Stress: None   Relationships     Social connections:     Talks on phone: None     Gets together: None     Attends Hoahaoism service: None     Active member of club or organization: None     Attends meetings of clubs or organizations: None     Relationship status: None     Intimate partner violence:     Fear of current or ex partner: None     Emotionally abused: None     Physically abused: None     Forced sexual activity: None   Other Topics Concern      Service Not Asked     Blood Transfusions Not Asked     Caffeine Concern No     Occupational Exposure Not Asked     Hobby Hazards Not Asked     Sleep Concern Yes     Comment: sleep apnea, wears oral device     Stress Concern No     Weight Concern Yes     Comment: weight loss 15# over last year     Special Diet Yes     Comment: low sugar, no processed foods, juicing     Back Care Not Asked     Exercise Yes     Comment: 3 days week, biking, elliptical     Bike Helmet Not Asked     Seat Belt Not Asked     Self-Exams Not Asked     Parent/sibling w/ CABG, MI or angioplasty before 65F 55M? Not Asked   Social History Narrative     None       Review of Systems:  Skin:  Negative     Eyes:  Positive for glasses  ENT:  Negative    Respiratory:  Positive for sleep apnea  Cardiovascular:  Negative;chest pain;lightheadedness;dizziness;syncope or near-syncope;cyanosis;exercise intolerance;fatigue  "Positive for;palpitations  Gastroenterology: Negative    Genitourinary:  Negative    Musculoskeletal:  Positive for back pain  Neurologic:  Positive for numbness or tingling of feet  Psychiatric:  Negative    Heme/Lymph/Imm:  Positive for    Endocrine:  Negative      Physical Exam:  Vitals: /73   Pulse 63   Ht 1.664 m (5' 5.5\")   Wt 69.4 kg (153 lb)   BMI 25.07 kg/m      Constitutional:           Skin:             Head:           Eyes:           Lymph:      ENT:           Neck:           Respiratory:            Cardiac:                                                           GI:           Extremities and Muscular Skeletal:                 Neurological:           Psych:           Recent Lab Results:  LIPID RESULTS:  Lab Results   Component Value Date    CHOL 167 09/21/2018    HDL 63 09/21/2018    LDL 92 09/21/2018    TRIG 62 09/21/2018    CHOLHDLRATIO 3.1 12/04/2008       LIVER ENZYME RESULTS:  Lab Results   Component Value Date    AST 27 09/21/2018    ALT 38 09/21/2018       CBC RESULTS:  Lab Results   Component Value Date    WBC 3.6 (L) 12/04/2008    RBC 3.95 12/04/2008    HGB 11.9 12/04/2008    HCT 36.1 12/04/2008    MCV 91 12/04/2008    MCH 30.1 12/04/2008    MCHC 33.0 12/04/2008    RDW 12.9 12/04/2008     12/04/2008       BMP RESULTS:  Lab Results   Component Value Date     09/21/2018    POTASSIUM 4.7 09/21/2018    CHLORIDE 98 09/21/2018    CO2 30 09/21/2018    ANIONGAP 8 09/21/2018    GLC 93 09/21/2018    BUN 17 09/21/2018    CR 0.74 09/21/2018    GFRESTIMATED 80 09/21/2018    GFRESTBLACK >90 09/21/2018    GRACE 10.0 09/21/2018        A1C RESULTS:  No results found for: A1C    INR RESULTS:  No results found for: INR        CC  Angelica Marcano MD  [address]  [place] [address]                "

## 2019-06-18 NOTE — LETTER
6/18/2019    Angelica Marcano MD  [address]  [facility] [address]    RE: Diane Ritter       Dear Colleague,    I had the pleasure of seeing Diane Ritter in the [facility].    HPI and Plan:   See dictation    No orders of the defined types were placed in this encounter.    No orders of the defined types were placed in this encounter.    There are no discontinued medications.      No diagnosis found.    CURRENT MEDICATIONS:  Current Outpatient Medications   Medication Sig Dispense Refill     aspirin 81 MG tablet Take by mouth every other day        Cholecalciferol (VITAMIN D) 1000 UNITS capsule Take 1 capsule by mouth daily In winter       EPINEPHrine (EPIPEN) 0.3 MG/0.3ML injection Inject 0.3 mg into the muscle once as needed for anaphylaxis       FISH OIL 1,000 mg daily        lisinopril (PRINIVIL/ZESTRIL) 10 MG tablet Take 1 tablet (10 mg) by mouth daily 90 tablet 3     Multiple Vitamin (DAILY MULTIVITAMIN PO) Take by mouth daily          ALLERGIES     Allergies   Allergen Reactions     Compazine Other (See Comments)     Facial tics     Shellfish Allergy Nausea and Vomiting and Rash     Sulfa Drugs Rash       PAST MEDICAL HISTORY:  Past Medical History:   Diagnosis Date     Aortic valve disorder-2-3 central aortic regugitation 3/26/2015     BHUMIKA (obstructive sleep apnea) 3/26/2015     Thyroid nodule      Venous insufficiency        PAST SURGICAL HISTORY:  Past Surgical History:   Procedure Laterality Date     APPENDECTOMY       GYN SURGERY      ovarian cysts removed      HYSTERECTOMY VAGINAL      cervical cancer     ORTHOPEDIC SURGERY  2010    hand surgery on right side       FAMILY HISTORY:  Family History   Problem Relation Age of Onset     Heart Disease Mother         mitral valve disorder??     Rheumatoid Arthritis Mother      Other - See Comments Father         abdominal aortic aneurysm     Hypertension Father      Colon Polyps Father       Diabetes Brother      Pancreatic Cancer Brother      Diabetes Brother      Hypertension Brother      Breast Cancer No family hx of        SOCIAL HISTORY:  Social History     Socioeconomic History     Marital status:      Spouse name: None     Number of children: None     Years of education: None     Highest education level: None   Occupational History     None   Social Needs     Financial resource strain: None     Food insecurity:     Worry: None     Inability: None     Transportation needs:     Medical: None     Non-medical: None   Tobacco Use     Smoking status: Never Smoker     Smokeless tobacco: Never Used   Substance and Sexual Activity     Alcohol use: No     Drug use: No     Sexual activity: Yes     Partners: Male   Lifestyle     Physical activity:     Days per week: None     Minutes per session: None     Stress: None   Relationships     Social connections:     Talks on phone: None     Gets together: None     Attends Yarsanism service: None     Active member of club or organization: None     Attends meetings of clubs or organizations: None     Relationship status: None     Intimate partner violence:     Fear of current or ex partner: None     Emotionally abused: None     Physically abused: None     Forced sexual activity: None   Other Topics Concern      Service Not Asked     Blood Transfusions Not Asked     Caffeine Concern No     Occupational Exposure Not Asked     Hobby Hazards Not Asked     Sleep Concern Yes     Comment: sleep apnea, wears oral device     Stress Concern No     Weight Concern Yes     Comment: weight loss 15# over last year     Special Diet Yes     Comment: low sugar, no processed foods, juicing     Back Care Not Asked     Exercise Yes     Comment: 3 days week, biking, elliptical     Bike Helmet Not Asked     Seat Belt Not Asked     Self-Exams Not Asked     Parent/sibling w/ CABG, MI or angioplasty before 65F 55M? Not Asked   Social History Narrative     None       Review of  "Systems:  Skin:  Negative     Eyes:  Positive for glasses  ENT:  Negative    Respiratory:  Positive for sleep apnea  Cardiovascular:  Negative;chest pain;lightheadedness;dizziness;syncope or near-syncope;cyanosis;exercise intolerance;fatigue Positive for;palpitations  Gastroenterology: Negative    Genitourinary:  Negative    Musculoskeletal:  Positive for back pain  Neurologic:  Positive for numbness or tingling of feet  Psychiatric:  Negative    Heme/Lymph/Imm:  Positive for    Endocrine:  Negative      Physical Exam:  Vitals: /73   Pulse 63   Ht 1.664 m (5' 5.5\")   Wt 69.4 kg (153 lb)   BMI 25.07 kg/m       Constitutional:           Skin:             Head:           Eyes:           Lymph:      ENT:           Neck:           Respiratory:            Cardiac:                                                           GI:           Extremities and Muscular Skeletal:                 Neurological:           Psych:           Recent Lab Results:  LIPID RESULTS:  Lab Results   Component Value Date    CHOL 167 09/21/2018    HDL 63 09/21/2018    LDL 92 09/21/2018    TRIG 62 09/21/2018    CHOLHDLRATIO 3.1 12/04/2008       LIVER ENZYME RESULTS:  Lab Results   Component Value Date    AST 27 09/21/2018    ALT 38 09/21/2018       CBC RESULTS:  Lab Results   Component Value Date    WBC 3.6 (L) 12/04/2008    RBC 3.95 12/04/2008    HGB 11.9 12/04/2008    HCT 36.1 12/04/2008    MCV 91 12/04/2008    MCH 30.1 12/04/2008    MCHC 33.0 12/04/2008    RDW 12.9 12/04/2008     12/04/2008       BMP RESULTS:  Lab Results   Component Value Date     09/21/2018    POTASSIUM 4.7 09/21/2018    CHLORIDE 98 09/21/2018    CO2 30 09/21/2018    ANIONGAP 8 09/21/2018    GLC 93 09/21/2018    BUN 17 09/21/2018    CR 0.74 09/21/2018    GFRESTIMATED 80 09/21/2018    GFRESTBLACK >90 09/21/2018    GRACE 10.0 09/21/2018        A1C RESULTS:  No results found for: A1C    INR RESULTS:  No results found for: INR        CC  Angelica Marcano, " MD  [address]  [place] [address]                  Service Date: 2019      HISTORY OF PRESENT ILLNESS:  Ms. Ritter is a delightful 59-year-old with a bicuspid aortic valve.  She has had moderate aortic insufficiency and is here for a followup visit.  She continues to be active, biking and is completely asymptomatic.  No chest pain, chest pressure, shortness of breath, lightheadedness, dizziness, near dizziness or syncope.  She has been compliant with her medications including lisinopril 10 mg daily.      Review of her echocardiogram shows stable moderate 2+ aortic insufficiency with normal LV size and function and mild dilatation of the aortic root at 4.2.      ASSESSMENT AND PLAN:  It is a delight seeing Ms. Ritter back in clinic really appearing stable from a cardiovascular standpoint.  She has moderate asymptomatic aortic insufficiency with bicuspid aortic valve.  I think the followup as we discussed in clinic here today is appropriate on an every-2-year basis.  She is to continue to stay active, and I will see her back at that time unless sooner symptoms warrant.      Jasmeet Looney MD, FACC         JASMEET LOONEY MD [facility]             D: 2019   T: 2019   MT: GEOFFREY      Name:     CADE RITTER   MRN:      [id number]        Account:      [id number]   :      1960           Service Date: 2019      Document: [id number]         Thank you for allowing me to participate in the care of your patient.      Sincerely,     JASMEET LOONEY MD     [facility]    cc:   Angelica Marcano MD  [address]  [place] [address]

## 2019-06-18 NOTE — PROGRESS NOTES
Service Date: 2019      HISTORY OF PRESENT ILLNESS:  Ms. Ritter is a delightful 59-year-old with a bicuspid aortic valve.  She has had moderate aortic insufficiency and is here for a followup visit.  She continues to be active, biking and is completely asymptomatic.  No chest pain, chest pressure, shortness of breath, lightheadedness, dizziness, near dizziness or syncope.  She has been compliant with her medications including lisinopril 10 mg daily.      Review of her echocardiogram shows stable moderate 2+ aortic insufficiency with normal LV size and function and mild dilatation of the aortic root at 4.2.      ASSESSMENT AND PLAN:  It is a delight seeing Ms. Ritter back in clinic really appearing stable from a cardiovascular standpoint.  She has moderate asymptomatic aortic insufficiency with bicuspid aortic valve.  I think the followup as we discussed in clinic here today is appropriate on an every-2-year basis.  She is to continue to stay active, and I will see her back at that time unless sooner symptoms warrant.      Jasmeet Looney MD, FACC         JASMEET LOONEY MD FACC             D: 2019   T: 2019   MT: GEOFFREY      Name:     CADE RITTER   MRN:      [id number]        Account:      [id number]   :      1960           Service Date: 2019      Document: [id number]

## 2019-07-12 DIAGNOSIS — R79.89 ELEVATED TSH: ICD-10-CM

## 2019-07-12 LAB
T4 FREE SERPL-MCNC: 0.81 NG/DL (ref 0.76–1.46)
TSH SERPL DL<=0.005 MIU/L-ACNC: 6.79 MU/L (ref 0.4–4)

## 2019-07-12 PROCEDURE — 36415 COLL VENOUS BLD VENIPUNCTURE: CPT | Performed by: INTERNAL MEDICINE

## 2019-07-12 PROCEDURE — 84439 ASSAY OF FREE THYROXINE: CPT | Performed by: INTERNAL MEDICINE

## 2019-07-12 PROCEDURE — 86376 MICROSOMAL ANTIBODY EACH: CPT | Performed by: INTERNAL MEDICINE

## 2019-07-12 PROCEDURE — 84443 ASSAY THYROID STIM HORMONE: CPT | Performed by: INTERNAL MEDICINE

## 2019-07-15 LAB — THYROPEROXIDASE AB SERPL-ACNC: <10 IU/ML

## 2019-07-18 DIAGNOSIS — R79.89 ELEVATED TSH: Primary | ICD-10-CM

## 2019-07-18 NOTE — RESULT ENCOUNTER NOTE
Carina Contreras,    This is to inform you regarding your test result.    I spoke to you on phone but sending you a result note also.  Your TSH numbers are staying slightly on higher side.  At this point your free T4 is  still normal  Your TPO antibodies to check for Hashimoto's is negative  We discussed this with you and you decided to wait to start the medication  That is okay as your TSH is not near 10  Please recheck your blood test in 3 months  Orders are placed  Please make lab only appointment.    Sincerely,      Dr.Nasima Jeet MD,FACP

## 2019-10-03 ENCOUNTER — HEALTH MAINTENANCE LETTER (OUTPATIENT)
Age: 59
End: 2019-10-03

## 2019-10-22 ENCOUNTER — OFFICE VISIT (OUTPATIENT)
Dept: FAMILY MEDICINE | Facility: CLINIC | Age: 59
End: 2019-10-22
Payer: COMMERCIAL

## 2019-10-22 ENCOUNTER — HOSPITAL ENCOUNTER (OUTPATIENT)
Dept: MAMMOGRAPHY | Facility: CLINIC | Age: 59
Discharge: HOME | End: 2019-10-22
Attending: INTERNAL MEDICINE | Admitting: INTERNAL MEDICINE
Payer: COMMERCIAL

## 2019-10-22 VITALS
BODY MASS INDEX: 23.83 KG/M2 | HEIGHT: 66 IN | OXYGEN SATURATION: 98 % | DIASTOLIC BLOOD PRESSURE: 59 MMHG | HEART RATE: 74 BPM | WEIGHT: 148.3 LBS | TEMPERATURE: 98 F | SYSTOLIC BLOOD PRESSURE: 99 MMHG

## 2019-10-22 DIAGNOSIS — Z13.0 SCREENING FOR DEFICIENCY ANEMIA: ICD-10-CM

## 2019-10-22 DIAGNOSIS — E03.9 HYPOTHYROIDISM, UNSPECIFIED TYPE: Primary | ICD-10-CM

## 2019-10-22 DIAGNOSIS — E03.9 HYPOTHYROIDISM, UNSPECIFIED TYPE: ICD-10-CM

## 2019-10-22 DIAGNOSIS — R79.89 ELEVATED TSH: ICD-10-CM

## 2019-10-22 DIAGNOSIS — Z23 NEED FOR PROPHYLACTIC VACCINATION AND INOCULATION AGAINST INFLUENZA: ICD-10-CM

## 2019-10-22 DIAGNOSIS — Z85.41 HISTORY OF CERVICAL CANCER: ICD-10-CM

## 2019-10-22 DIAGNOSIS — I77.810 AORTIC ROOT DILATATION (H): ICD-10-CM

## 2019-10-22 DIAGNOSIS — Z12.31 VISIT FOR SCREENING MAMMOGRAM: ICD-10-CM

## 2019-10-22 DIAGNOSIS — Z71.89 ADVANCED DIRECTIVES, COUNSELING/DISCUSSION: ICD-10-CM

## 2019-10-22 DIAGNOSIS — G47.33 OSA (OBSTRUCTIVE SLEEP APNEA): ICD-10-CM

## 2019-10-22 DIAGNOSIS — Q23.81 BICUSPID AORTIC VALVE: ICD-10-CM

## 2019-10-22 DIAGNOSIS — I35.1 NONRHEUMATIC AORTIC VALVE INSUFFICIENCY: ICD-10-CM

## 2019-10-22 DIAGNOSIS — D72.819 LEUKOPENIA, UNSPECIFIED TYPE: Primary | ICD-10-CM

## 2019-10-22 DIAGNOSIS — Z79.899 MEDICATION MANAGEMENT: ICD-10-CM

## 2019-10-22 DIAGNOSIS — Z13.220 LIPID SCREENING: ICD-10-CM

## 2019-10-22 DIAGNOSIS — R11.0 NAUSEA: ICD-10-CM

## 2019-10-22 DIAGNOSIS — Z00.00 ROUTINE GENERAL MEDICAL EXAMINATION AT A HEALTH CARE FACILITY: Primary | ICD-10-CM

## 2019-10-22 DIAGNOSIS — I35.1 AORTIC VALVE INSUFFICIENCY, ETIOLOGY OF CARDIAC VALVE DISEASE UNSPECIFIED: ICD-10-CM

## 2019-10-22 DIAGNOSIS — D72.819 LEUKOPENIA, UNSPECIFIED TYPE: ICD-10-CM

## 2019-10-22 LAB
ANION GAP SERPL CALCULATED.3IONS-SCNC: 6 MMOL/L (ref 3–14)
BUN SERPL-MCNC: 26 MG/DL (ref 7–30)
CALCIUM SERPL-MCNC: 9.4 MG/DL (ref 8.5–10.1)
CHLORIDE SERPL-SCNC: 103 MMOL/L (ref 94–109)
CHOLEST SERPL-MCNC: 228 MG/DL
CO2 SERPL-SCNC: 26 MMOL/L (ref 20–32)
CREAT SERPL-MCNC: 0.72 MG/DL (ref 0.52–1.04)
ERYTHROCYTE [DISTWIDTH] IN BLOOD BY AUTOMATED COUNT: 12.8 % (ref 10–15)
GFR SERPL CREATININE-BSD FRML MDRD: >90 ML/MIN/{1.73_M2}
GLUCOSE SERPL-MCNC: 88 MG/DL (ref 70–99)
HCT VFR BLD AUTO: 36.5 % (ref 35–47)
HDLC SERPL-MCNC: 76 MG/DL
HGB BLD-MCNC: 12.4 G/DL (ref 11.7–15.7)
LDLC SERPL CALC-MCNC: 137 MG/DL
MCH RBC QN AUTO: 31.7 PG (ref 26.5–33)
MCHC RBC AUTO-ENTMCNC: 34 G/DL (ref 31.5–36.5)
MCV RBC AUTO: 93 FL (ref 78–100)
NONHDLC SERPL-MCNC: 152 MG/DL
PLATELET # BLD AUTO: 175 10E9/L (ref 150–450)
POTASSIUM SERPL-SCNC: 4.1 MMOL/L (ref 3.4–5.3)
RBC # BLD AUTO: 3.91 10E12/L (ref 3.8–5.2)
SODIUM SERPL-SCNC: 135 MMOL/L (ref 133–144)
T4 FREE SERPL-MCNC: 0.7 NG/DL (ref 0.76–1.46)
TRIGL SERPL-MCNC: 73 MG/DL
TSH SERPL DL<=0.005 MIU/L-ACNC: 10.35 MU/L (ref 0.4–4)
WBC # BLD AUTO: 3.4 10E9/L (ref 4–11)

## 2019-10-22 PROCEDURE — 80061 LIPID PANEL: CPT | Performed by: INTERNAL MEDICINE

## 2019-10-22 PROCEDURE — 80048 BASIC METABOLIC PNL TOTAL CA: CPT | Performed by: INTERNAL MEDICINE

## 2019-10-22 PROCEDURE — 77067 SCR MAMMO BI INCL CAD: CPT

## 2019-10-22 PROCEDURE — 84443 ASSAY THYROID STIM HORMONE: CPT | Performed by: INTERNAL MEDICINE

## 2019-10-22 PROCEDURE — 36415 COLL VENOUS BLD VENIPUNCTURE: CPT | Performed by: INTERNAL MEDICINE

## 2019-10-22 PROCEDURE — 90682 RIV4 VACC RECOMBINANT DNA IM: CPT | Performed by: INTERNAL MEDICINE

## 2019-10-22 PROCEDURE — 85027 COMPLETE CBC AUTOMATED: CPT | Performed by: INTERNAL MEDICINE

## 2019-10-22 PROCEDURE — 99396 PREV VISIT EST AGE 40-64: CPT | Mod: 25 | Performed by: INTERNAL MEDICINE

## 2019-10-22 PROCEDURE — 90471 IMMUNIZATION ADMIN: CPT | Performed by: INTERNAL MEDICINE

## 2019-10-22 PROCEDURE — 99213 OFFICE O/P EST LOW 20 MIN: CPT | Mod: 25 | Performed by: INTERNAL MEDICINE

## 2019-10-22 PROCEDURE — 84439 ASSAY OF FREE THYROXINE: CPT | Performed by: INTERNAL MEDICINE

## 2019-10-22 RX ORDER — SCOLOPAMINE TRANSDERMAL SYSTEM 1 MG/1
1 PATCH, EXTENDED RELEASE TRANSDERMAL
COMMUNITY
End: 2019-10-22

## 2019-10-22 RX ORDER — SCOLOPAMINE TRANSDERMAL SYSTEM 1 MG/1
1 PATCH, EXTENDED RELEASE TRANSDERMAL
Qty: 10 PATCH | Refills: 1 | Status: SHIPPED | OUTPATIENT
Start: 2019-10-22 | End: 2021-10-29

## 2019-10-22 RX ORDER — LISINOPRIL 10 MG/1
10 TABLET ORAL DAILY
Qty: 90 TABLET | Refills: 3 | Status: SHIPPED | OUTPATIENT
Start: 2019-10-22 | End: 2020-10-29

## 2019-10-22 RX ORDER — LEVOTHYROXINE SODIUM 50 UG/1
50 TABLET ORAL DAILY
Qty: 90 TABLET | Refills: 0 | Status: SHIPPED | OUTPATIENT
Start: 2019-10-22 | End: 2020-01-03

## 2019-10-22 ASSESSMENT — MIFFLIN-ST. JEOR: SCORE: 1246.49

## 2019-10-22 NOTE — RESULT ENCOUNTER NOTE
Carina Contreras,    This is to inform you regarding your test result.    I spoke to you on phone but sending you a result note also.  Your TSH is elevated and higher than before and your free T4 is low  That means you have hypothyroidism means underactive thyroid  You have a family history of that  As it is 10.35 we recommend starting levothyroxine  You will take first thing in the morning with empty stomach as it is absorbed better with empty stomach  Your total cholesterol is elevated.  HDL which is called good cholesterol is normal.  Your LDL which is called bad cholesterol is elevated.  Eat low cholesterol low fat  diet and do regular physical activity.  Basic metabolic panel which includes electrolytes and kidney fucntion is normal  Your white count is a staying on the low side  It is not dangerously low but lower than normal  You mentioned you have a history of low white count for a long time  I found that on review of your lab results in care everywhere  I would recommend that we check it again in 2 months along with your thyroid hormone  I will also check your iron and B12 level  Please make lab only appointment for the blood work.      Sincerely,      Dr.Nasima Jeet MD,FACP

## 2019-10-22 NOTE — PROGRESS NOTES
"   SUBJECTIVE:   CC: Diane Ritter is an 59 year old woman who presents for preventive health visit.     Healthy Habits:    Do you get at least three servings of calcium containing foods daily (dairy, green leafy vegetables, etc.)? { :785431::\"yes\"}    Amount of exercise or daily activities, outside of work: { :025308}    Problems taking medications regularly { :934757::\"No\"}    Medication side effects: { :876575::\"No\"}    Have you had an eye exam in the past two years? { :436725}    Do you see a dentist twice per year? { :483194}    Do you have sleep apnea, excessive snoring or daytime drowsiness?{ :424292}  {Outside tests to abstract? :074082}    {additional problems to add (Optional):995102}    Today's PHQ-2 Score:   PHQ-2 ( 1999 [employer]) 10/22/2019 9/21/2018   Q1: Little interest or pleasure in doing things 0 0   Q2: Feeling down, depressed or hopeless 0 0   PHQ-2 Score 0 0     {PHQ-2 LOOK IN ASSESSMENTS (Optional) :628416}  Abuse: Current or Past(Physical, Sexual or Emotional)- {YES/NO/NA:263774}  Do you feel safe in your environment? {YES/NO/NA:129521}    Social History     Tobacco Use     Smoking status: Never Smoker     Smokeless tobacco: Never Used   Substance Use Topics     Alcohol use: No     If you drink alcohol do you typically have >3 drinks per day or >7 drinks per week? {ETOH :588181}                     Reviewed orders with patient.  Reviewed health maintenance and updated orders accordingly - {Yes/No:033975::\"Yes\"}  {Chronicprobdata (Optional):820556}    {Mammo Decision Support (Optional):791141}    Pertinent mammograms are reviewed under the imaging tab.  History of abnormal Pap smear: {PAP HX:713012}  PAP / HPV Latest Ref Rng & Units 9/21/2018 11/13/2009   PAP - NIL NIL   HPV 16 DNA NEG:Negative Negative -   HPV 18 DNA NEG:Negative Negative -   OTHER HR HPV NEG:Negative Negative -     Reviewed and updated as needed this visit by clinical staff  Tobacco  Allergies  Meds  Soc Hx    " "    Reviewed and updated as needed this visit by Provider        {HISTORY OPTIONS (Optional):190413}    ROS:  { :545469}    OBJECTIVE:   BP 99/59 (BP Location: Right arm, Cuff Size: Adult Regular)   Pulse 74   Temp 98  F (36.7  C) (Oral)   Ht 1.664 m (5' 5.5\")   Wt 67.3 kg (148 lb 4.8 oz)   SpO2 98%   BMI 24.30 kg/m    EXAM:  {Exam Choices:696858}    {Diagnostic Test Results (Optional):938424::\"Diagnostic Test Results:\",\"Labs reviewed in Epic\"}    ASSESSMENT/PLAN:   {Diag Picklist:274077}    COUNSELING:   {FEMALE COUNSELING MESSAGES:833696::\"Reviewed preventive health counseling, as reflected in patient instructions\"}    Estimated body mass index is 24.3 kg/m  as calculated from the following:    Height as of this encounter: 1.664 m (5' 5.5\").    Weight as of this encounter: 67.3 kg (148 lb 4.8 oz).    {Weight Management Plan (ACO) Complete if BMI is abnormal-  Ages 18-64  BMI >24.9.  Age 65+ with BMI <23 or >30 (Optional):576541}     reports that she has never smoked. She has never used smokeless tobacco.  {Tobacco Cessation -- Complete if patient is a smoker (Optional):978804}    Counseling Resources:  ATP IV Guidelines  Pooled Cohorts Equation Calculator  Breast Cancer Risk Calculator  FRAX Risk Assessment  ICSI Preventive Guidelines  Dietary Guidelines for Americans, 2010  USDA's MyPlate  ASA Prophylaxis  Lung CA Screening    Angelica Marcano MD  [facility]  "

## 2019-10-22 NOTE — PATIENT INSTRUCTIONS
If your blood pressure remains consistently low, please let me know and we can decrease lisinopril to 5 mg.    I have refilled lisinopril and scopolamine.     Labs today.     seek sooner medical attention if there is any worsening of symptoms or problems.      Preventive Health Recommendations  Female Ages 50 - 64    Yearly exam: See your health care provider every year in order to  o Review health changes.   o Discuss preventive care.    o Review your medicines if your doctor has prescribed any.      Get a Pap test every three years (unless you have an abnormal result and your provider advises testing more often).    If you get Pap tests with HPV test, you only need to test every 5 years, unless you have an abnormal result.     You do not need a Pap test if your uterus was removed (hysterectomy) and you have not had cancer.    You should be tested each year for STDs (sexually transmitted diseases) if you're at risk.     Have a mammogram every 1 to 2 years.    Have a colonoscopy at age 50, or have a yearly FIT test (stool test). These exams screen for colon cancer.      Have a cholesterol test every 5 years, or more often if advised.    Have a diabetes test (fasting glucose) every three years. If you are at risk for diabetes, you should have this test more often.     If you are at risk for osteoporosis (brittle bone disease), think about having a bone density scan (DEXA).    Shots: Get a flu shot each year. Get a tetanus shot every 10 years.    Nutrition:     Eat at least 5 servings of fruits and vegetables each day.    Eat whole-grain bread, whole-wheat pasta and brown rice instead of white grains and rice.    Get adequate Calcium and Vitamin D.     Lifestyle    Exercise at least 150 minutes a week (30 minutes a day, 5 days a week). This will help you control your weight and prevent disease.    Limit alcohol to one drink per day.    No smoking.     Wear sunscreen to prevent skin cancer.     See your dentist every  six months for an exam and cleaning.    See your eye doctor every 1 to 2 years.

## 2019-10-22 NOTE — PROGRESS NOTES
SUBJECTIVE:   CC: Diane Ritter is an 59 year old woman who presents for preventive health visit.     Healthy Habits:    Getting at least 3 servings of Calcium per day:  Yes    Bi-annual eye exam:  Yes    Dental care twice a year:  Yes    Sleep apnea or symptoms of sleep apnea:  Sleep apnea    Diet:  Regular (no restrictions)    Frequency of exercise:  4-5 days/week    Duration of exercise:  30-45 minutes    Taking medications regularly:  Yes    Barriers to taking medications:  None    Medication side effects:  None    PHQ-2 Total Score:    Additional concerns today:  No        CV: blood pressure  Compliant with lisinopril 10 mg.  Notes he systolic pressures are generally around 120.   She attributes lower blood pressure today with dehydration.  Monitors her blood pressure at home.    BP Readings from Last 3 Encounters:   10/22/19 99/59   06/18/19 128/73   09/21/18 129/75     Obstructive Sleep Apnea  Managing sleep apnea with c-pap machine.    Nausea  Reports nausea, most often when she travel.  Managing symptoms with scopolamine.   Requesting medication refill.     Today's PHQ-2 Score:   PHQ-2 ( 1999 Pfizer) 10/22/2019   Q1: Little interest or pleasure in doing things 0   Q2: Feeling down, depressed or hopeless 0   PHQ-2 Score 0       Abuse: Current or Past(Physical, Sexual or Emotional)- No  Do you feel safe in your environment? Yes    Social History     Tobacco Use     Smoking status: Never Smoker     Smokeless tobacco: Never Used   Substance Use Topics     Alcohol use: No     If you drink alcohol do you typically have >3 drinks per day or >7 drinks per week? No    Alcohol Use 10/22/2019   Prescreen: >3 drinks/day or >7 drinks/week? No       Reviewed orders with patient.  Reviewed health maintenance and updated orders accordingly - Yes    Labs reviewed in EPIC  Patient Active Problem List   Diagnosis     Aortic valve disorder-2-3 central aortic regugitation     BHUMIKA (obstructive sleep apnea)     Thyroid  nodule     Nonrheumatic aortic valve insufficiency     Liver lesion     Rosacea     History of ESBL E. coli infection     Venous (peripheral) insufficiency     History of colonic polyps     Elevated TSH     Screening for malignant neoplasm of vagina after total hysterectomy     Bicuspid aortic valve     Hypothyroidism, unspecified type     Leukopenia, unspecified type     Past Surgical History:   Procedure Laterality Date     APPENDECTOMY       GYN SURGERY      ovarian cysts removed      HYSTERECTOMY VAGINAL      cervical cancer     ORTHOPEDIC SURGERY  2010    hand surgery on right side       Social History     Tobacco Use     Smoking status: Never Smoker     Smokeless tobacco: Never Used   Substance Use Topics     Alcohol use: No     Family History   Problem Relation Age of Onset     Heart Disease Mother         mitral valve disorder??     Rheumatoid Arthritis Mother      Other - See Comments Father         abdominal aortic aneurysm     Hypertension Father      Colon Polyps Father      Diabetes Brother      Pancreatic Cancer Brother      Diabetes Brother      Hypertension Brother      Breast Cancer No family hx of          Current Outpatient Medications   Medication Sig Dispense Refill     aspirin 81 MG tablet Take by mouth every other day        Cholecalciferol (VITAMIN D) 1000 UNITS capsule Take 1 capsule by mouth daily In winter       EPINEPHrine (EPIPEN) 0.3 MG/0.3ML injection Inject 0.3 mg into the muscle once as needed for anaphylaxis       FISH OIL 1,000 mg daily        lisinopril (PRINIVIL/ZESTRIL) 10 MG tablet Take 1 tablet (10 mg) by mouth daily For high Blood Pressure 90 tablet 3     Multiple Vitamin (DAILY MULTIVITAMIN PO) Take by mouth daily        scopolamine (TRANSDERM-SCOP, 1.5 MG,) 1 MG/3DAYS 72 hr patch Place 1 patch onto the skin every 72 hours as needed For nausea 10 patch 1     levothyroxine (SYNTHROID/LEVOTHROID) 50 MCG tablet Take 1 tablet (50 mcg) by mouth daily For hypthyroidism. 90 tablet  0     Allergies   Allergen Reactions     Compazine Other (See Comments)     Facial tics     Shellfish Allergy Nausea and Vomiting and Rash     Sulfa Drugs Rash       Mammogram Screening: Patient over age 50, mutual decision to screen reflected in health maintenance.    Pertinent mammograms are reviewed under the imaging tab.  History of abnormal Pap smear: NO - age 30- 65 PAP every 3 years recommended  PAP / HPV Latest Ref Rng & Units 9/21/2018 11/13/2009   PAP - NIL NIL   HPV 16 DNA NEG:Negative Negative -   HPV 18 DNA NEG:Negative Negative -   OTHER HR HPV NEG:Negative Negative -     Reviewed and updated as needed this visit by clinical staff  Tobacco  Allergies  Meds  Soc Hx        Reviewed and updated as needed this visit by Provider            Review of Systems  CONSTITUTIONAL: NEGATIVE for fever, chills, change in weight  INTEGUMENTARY/SKIN: NEGATIVE for worrisome rashes, moles or lesions  EYES: NEGATIVE for vision changes or irritation  ENT: NEGATIVE for ear, mouth and throat problems  RESP: NEGATIVE for significant cough or SOB  BREAST: NEGATIVE for masses, tenderness or discharge  CV: NEGATIVE for chest pain, palpitations or peripheral edema  GI: NEGATIVE for nausea, abdominal pain, heartburn, or change in bowel habits  : NEGATIVE for unusual urinary or vaginal symptoms. No vaginal bleeding.  MUSCULOSKELETAL: NEGATIVE for significant arthralgias or myalgia  NEURO: NEGATIVE for weakness, dizziness or paresthesias  PSYCHIATRIC: NEGATIVE for changes in mood or affect     This document serves as a record of the services and decisions personally performed and made by Angelica Marcano MD. It was created on her behalf by Priyanka Mcfarland, a trained medical scribe. The creation of this document is based on the provider's statements to the medical scribe.  Priyanka Mcfarland 8:48 AM 10/22/2019    OBJECTIVE:   BP 99/59 (BP Location: Right arm, Cuff Size: Adult Regular)   Pulse 74   Temp 98  F (36.7  C) (Oral)   Ht  "1.664 m (5' 5.5\")   Wt 67.3 kg (148 lb 4.8 oz)   SpO2 98%   BMI 24.30 kg/m    Physical Exam  GENERAL APPEARANCE: healthy, alert and no distress  EYES: Eyes grossly normal to inspection, PERRL and conjunctivae and sclerae normal  HENT: ear canals and TM's normal, wax build up in left ear, right ear clear, nose and mouth without ulcers or lesions, oropharynx clear and oral mucous membranes moist  NECK: no adenopathy, no asymmetry, masses, or scars and thyroid normal to palpation  RESP: lungs clear to auscultation - no rales, rhonchi or wheezes  CV: regular rate and rhythm, normal S1 S2, no S3 or S4, no murmur, click or rub, no peripheral edema and peripheral pulses strong  ABDOMEN: soft, nontender, no hepatosplenomegaly, no masses and bowel sounds normal  MS: no musculoskeletal defects are noted and gait is age appropriate without ataxia, hammer toes  SKIN: no suspicious lesions or rashes, scar of surgery below longitudinal below umblicus  NEURO: Normal strength and tone, sensory exam grossly normal, mentation intact and speech normal  PSYCH: mentation appears normal and affect normal/bright      Diagnostic Test Results:  Labs reviewed in Epic    ASSESSMENT/PLAN:   Diane was seen today for physical.    Diagnoses and all orders for this visit:    Routine general medical examination at a health care facility  Colonoscopy completed 10/30/2015 and will be due next year. She is to follow up every 5 years due to family history and polyps found in previous results.  Immunizations up to date   Influenza vaccination administered today.  PAP with HPV testing 09/21/2018  Mammogram up to date.    Elevated TSH  -     TSH with free T4 reflex  Patient has family history of hypothyroidism  Her TSH was elevated  See my result note we discussed with her and has started her on medication    Screening for deficiency anemia  -     CBC with platelets    Lipid screening  -     Lipid panel reflex to direct LDL Non-fasting    History of " "cervical cancer  PAP smear with HPV testing up to date.     Nonrheumatic aortic valve insufficiency  Compliant with lisinopril 10 mg.  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  Will decrease lisinopril to 5 mg if blood pressure remains consistent low.  -     lisinopril (PRINIVIL/ZESTRIL) 10 MG tablet; Take 1 tablet (10 mg) by mouth daily For high Blood Pressure    Nausea  Using scopolamine as needed for nausea. Medication refill sent.  -     scopolamine (TRANSDERM-SCOP, 1.5 MG,) 1 MG/3DAYS 72 hr patch; Place 1 patch onto the skin every 72 hours as needed For nausea    Bicuspid aortic valve  ECHO completed 06/5/2019.  Comments:  seen on echo    Aortic valve insufficiency, etiology of cardiac valve disease unspecified  She  is followed by cardiology    Aortic root dilatation (H)  Seen on echocardiogram she has followed cardiology and will get echo done every 2 years    Advanced directives, counseling/discussion  Information provided    BHUMIKA (obstructive sleep apnea)  Managing symptoms with c-pap nightly.  Comments:  on cpap    Medication management  -     Basic metabolic panel    Patient has chronic leukopenia  Records from care everywhere was reviewed and discussed with the patient  The numbers have been stable    COUNSELING:  Reviewed preventive health counseling, as reflected in patient instructions       Regular exercise       Healthy diet/nutrition       Immunizations    Vaccinated for: Influenza         Colon cancer screening       Advance Care Planning    Estimated body mass index is 24.3 kg/m  as calculated from the following:    Height as of this encounter: 1.664 m (5' 5.5\").    Weight as of this encounter: 67.3 kg (148 lb 4.8 oz).    Weight management plan: Discussed healthy diet and " exercise guidelines     reports that she has never smoked. She has never used smokeless tobacco.      Counseling Resources:  ATP IV Guidelines  Pooled Cohorts Equation Calculator  Breast Cancer Risk Calculator  FRAX Risk Assessment  ICSI Preventive Guidelines  Dietary Guidelines for Americans, 2010  USDA's MyPlate  ASA Prophylaxis  Lung CA Screening    The information in this document, created by the medical scribe, Priyanka Mcfarland, for me, accurately reflects the services I personally performed and the decisions made by me. I have reviewed and approved this document for accuracy prior to leaving the patient care area.    Angelica Marcano MD  [facility]

## 2019-10-22 NOTE — PROGRESS NOTES
This document serves as a record of the services and decisions personally performed and made by Angelica Marcano MD. It was created on her behalf by Priyanka Mcfarland, a trained medical scribe. The creation of this document is based on the provider's statements to the medical scribe.  Priyanka Mcfarland 8:49 AM 10/22/2019

## 2019-12-24 DIAGNOSIS — Z13.0 SCREENING FOR DEFICIENCY ANEMIA: ICD-10-CM

## 2019-12-24 DIAGNOSIS — E03.9 HYPOTHYROIDISM, UNSPECIFIED TYPE: ICD-10-CM

## 2019-12-24 DIAGNOSIS — D72.819 LEUKOPENIA, UNSPECIFIED TYPE: ICD-10-CM

## 2019-12-24 LAB
ERYTHROCYTE [DISTWIDTH] IN BLOOD BY AUTOMATED COUNT: 13.2 % (ref 10–15)
HCT VFR BLD AUTO: 36.7 % (ref 35–47)
HGB BLD-MCNC: 12 G/DL (ref 11.7–15.7)
MCH RBC QN AUTO: 31 PG (ref 26.5–33)
MCHC RBC AUTO-ENTMCNC: 32.7 G/DL (ref 31.5–36.5)
MCV RBC AUTO: 95 FL (ref 78–100)
PLATELET # BLD AUTO: 156 10E9/L (ref 150–450)
RBC # BLD AUTO: 3.87 10E12/L (ref 3.8–5.2)
VIT B12 SERPL-MCNC: 840 PG/ML (ref 193–986)
WBC # BLD AUTO: 3.2 10E9/L (ref 4–11)

## 2019-12-24 PROCEDURE — 84443 ASSAY THYROID STIM HORMONE: CPT | Performed by: INTERNAL MEDICINE

## 2019-12-24 PROCEDURE — 82728 ASSAY OF FERRITIN: CPT | Performed by: INTERNAL MEDICINE

## 2019-12-24 PROCEDURE — 36415 COLL VENOUS BLD VENIPUNCTURE: CPT | Performed by: INTERNAL MEDICINE

## 2019-12-24 PROCEDURE — 82607 VITAMIN B-12: CPT | Performed by: INTERNAL MEDICINE

## 2019-12-24 PROCEDURE — 85027 COMPLETE CBC AUTOMATED: CPT | Performed by: INTERNAL MEDICINE

## 2019-12-25 ENCOUNTER — MYC MEDICAL ADVICE (OUTPATIENT)
Dept: FAMILY MEDICINE | Facility: CLINIC | Age: 59
End: 2019-12-25

## 2019-12-26 DIAGNOSIS — E61.1 IRON DEFICIENCY: Primary | ICD-10-CM

## 2019-12-26 LAB
FERRITIN SERPL-MCNC: 8 NG/ML (ref 8–252)
TSH SERPL DL<=0.005 MIU/L-ACNC: 1.83 MU/L (ref 0.4–4)

## 2019-12-27 NOTE — RESULT ENCOUNTER NOTE
Carina Contreras,    This is to inform you regarding your test result.    I spoke to you on phone but sending you a result note also.   Ferritin which is iron stores in the body is low.  We want Ferritin level greater than   Take OTC Ferrous Sulfate 325 mg po once daily or every other day if you tolerate.  Take with vit C as vit C helps to absorb iron.  Iron can make you constipated so take stool softener.  Recheck ferritin in 4 months  We need work up for iron deficiency  You mentioned that you do not have any active bleeding.  So you need work up which include colonoscopy and endoscopy.  Please let me know what is your preference   Where you want to do procedure.  White count continue to stay low   But numbers has been stable  Vitamin B 12 level is normal.      Sincerely,      Dr.Nasima Jeet MD,FACP

## 2020-01-03 DIAGNOSIS — E03.9 HYPOTHYROIDISM, UNSPECIFIED TYPE: ICD-10-CM

## 2020-01-03 RX ORDER — LEVOTHYROXINE SODIUM 50 UG/1
TABLET ORAL
Qty: 90 TABLET | Refills: 3 | Status: SHIPPED | OUTPATIENT
Start: 2020-01-03 | End: 2020-10-29

## 2020-01-03 NOTE — TELEPHONE ENCOUNTER
Prescription approved per [facility] Refill Protocol.  Skyla EMMANUEL RN

## 2020-01-03 NOTE — TELEPHONE ENCOUNTER
"levothyroxine (SYNTHROID/LEVOTHROID) 50 MCG tablet 90 tablet 0 10/22/2019     Last Written Prescription Date:  10/22/19  Last Fill Quantity: 90,  # refills: 0   Last office visit: 10/22/2019 with prescribing provider:  Jeet   Future Office Visit:  None    Requested Prescriptions   Pending Prescriptions Disp Refills     levothyroxine (SYNTHROID/LEVOTHROID) 50 MCG tablet [Pharmacy Med Name: L-THYROXINE (SYNTHROID) TABS 50MCG] 90 tablet 4     Sig: TAKE 1 TABLET DAILY FOR HYPOTHYROIDISM       Thyroid Protocol Passed - 1/3/2020 12:10 AM        Passed - Patient is 12 years or older        Passed - Recent (12 mo) or future (30 days) visit within the authorizing provider's specialty     Patient has had an office visit with the authorizing provider or a provider within the authorizing providers department within the previous 12 mos or has a future within next 30 days. See \"Patient Info\" tab in inbasket, or \"Choose Columns\" in Meds & Orders section of the refill encounter.              Passed - Medication is active on med list        Passed - Normal TSH on file in past 12 months     Recent Labs   Lab Test 12/24/19  0921   TSH 1.83              Passed - No active pregnancy on record     If patient is pregnant or has had a positive pregnancy test, please check TSH.          Passed - No positive pregnancy test in past 12 months     If patient is pregnant or has had a positive pregnancy test, please check TSH.          No flowsheet data found.      "

## 2020-02-12 ENCOUNTER — TRANSFERRED RECORDS (OUTPATIENT)
Dept: HEALTH INFORMATION MANAGEMENT | Facility: CLINIC | Age: 60
End: 2020-02-12

## 2020-10-29 ENCOUNTER — OFFICE VISIT (OUTPATIENT)
Dept: FAMILY MEDICINE | Facility: CLINIC | Age: 60
End: 2020-10-29
Payer: COMMERCIAL

## 2020-10-29 VITALS
BODY MASS INDEX: 25.28 KG/M2 | HEIGHT: 66 IN | SYSTOLIC BLOOD PRESSURE: 113 MMHG | DIASTOLIC BLOOD PRESSURE: 70 MMHG | HEART RATE: 70 BPM | TEMPERATURE: 97.5 F | WEIGHT: 157.3 LBS | OXYGEN SATURATION: 99 %

## 2020-10-29 DIAGNOSIS — E03.9 HYPOTHYROIDISM, UNSPECIFIED TYPE: ICD-10-CM

## 2020-10-29 DIAGNOSIS — E61.1 IRON DEFICIENCY: ICD-10-CM

## 2020-10-29 DIAGNOSIS — I35.1 NONRHEUMATIC AORTIC VALVE INSUFFICIENCY: ICD-10-CM

## 2020-10-29 DIAGNOSIS — E78.5 HYPERLIPIDEMIA, UNSPECIFIED HYPERLIPIDEMIA TYPE: ICD-10-CM

## 2020-10-29 DIAGNOSIS — Z90.710 H/O VAGINAL HYSTERECTOMY: ICD-10-CM

## 2020-10-29 DIAGNOSIS — E78.5 HYPERLIPIDEMIA, UNSPECIFIED HYPERLIPIDEMIA TYPE: Primary | ICD-10-CM

## 2020-10-29 DIAGNOSIS — Z00.00 ROUTINE GENERAL MEDICAL EXAMINATION AT A HEALTH CARE FACILITY: Primary | ICD-10-CM

## 2020-10-29 DIAGNOSIS — Q23.81 BICUSPID AORTIC VALVE: ICD-10-CM

## 2020-10-29 DIAGNOSIS — I77.810 AORTIC ROOT DILATATION (H): ICD-10-CM

## 2020-10-29 DIAGNOSIS — D72.819 LEUKOPENIA, UNSPECIFIED TYPE: ICD-10-CM

## 2020-10-29 DIAGNOSIS — Z79.899 MEDICATION MANAGEMENT: ICD-10-CM

## 2020-10-29 LAB
ALBUMIN SERPL-MCNC: 3.9 G/DL (ref 3.4–5)
ALP SERPL-CCNC: 60 U/L (ref 40–150)
ALT SERPL W P-5'-P-CCNC: 40 U/L (ref 0–50)
ANION GAP SERPL CALCULATED.3IONS-SCNC: 4 MMOL/L (ref 3–14)
AST SERPL W P-5'-P-CCNC: 27 U/L (ref 0–45)
BILIRUB SERPL-MCNC: 0.6 MG/DL (ref 0.2–1.3)
BUN SERPL-MCNC: 19 MG/DL (ref 7–30)
CALCIUM SERPL-MCNC: 9.7 MG/DL (ref 8.5–10.1)
CHLORIDE SERPL-SCNC: 105 MMOL/L (ref 94–109)
CHOLEST SERPL-MCNC: 218 MG/DL
CO2 SERPL-SCNC: 31 MMOL/L (ref 20–32)
CREAT SERPL-MCNC: 0.8 MG/DL (ref 0.52–1.04)
ERYTHROCYTE [DISTWIDTH] IN BLOOD BY AUTOMATED COUNT: 12.7 % (ref 10–15)
FERRITIN SERPL-MCNC: 92 NG/ML (ref 8–252)
GFR SERPL CREATININE-BSD FRML MDRD: 79 ML/MIN/{1.73_M2}
GLUCOSE SERPL-MCNC: 86 MG/DL (ref 70–99)
HCT VFR BLD AUTO: 38.7 % (ref 35–47)
HDLC SERPL-MCNC: 74 MG/DL
HGB BLD-MCNC: 13.2 G/DL (ref 11.7–15.7)
LDLC SERPL CALC-MCNC: 132 MG/DL
MCH RBC QN AUTO: 32.5 PG (ref 26.5–33)
MCHC RBC AUTO-ENTMCNC: 34.1 G/DL (ref 31.5–36.5)
MCV RBC AUTO: 95 FL (ref 78–100)
NONHDLC SERPL-MCNC: 144 MG/DL
PLATELET # BLD AUTO: 154 10E9/L (ref 150–450)
POTASSIUM SERPL-SCNC: 4 MMOL/L (ref 3.4–5.3)
PROT SERPL-MCNC: 7.2 G/DL (ref 6.8–8.8)
RBC # BLD AUTO: 4.06 10E12/L (ref 3.8–5.2)
SODIUM SERPL-SCNC: 140 MMOL/L (ref 133–144)
TRIGL SERPL-MCNC: 58 MG/DL
TSH SERPL DL<=0.005 MIU/L-ACNC: 1.38 MU/L (ref 0.4–4)
WBC # BLD AUTO: 2.8 10E9/L (ref 4–11)

## 2020-10-29 PROCEDURE — 82728 ASSAY OF FERRITIN: CPT | Performed by: INTERNAL MEDICINE

## 2020-10-29 PROCEDURE — 80053 COMPREHEN METABOLIC PANEL: CPT | Performed by: INTERNAL MEDICINE

## 2020-10-29 PROCEDURE — 36415 COLL VENOUS BLD VENIPUNCTURE: CPT | Performed by: INTERNAL MEDICINE

## 2020-10-29 PROCEDURE — 99213 OFFICE O/P EST LOW 20 MIN: CPT | Mod: 25 | Performed by: INTERNAL MEDICINE

## 2020-10-29 PROCEDURE — 80061 LIPID PANEL: CPT | Performed by: INTERNAL MEDICINE

## 2020-10-29 PROCEDURE — 85027 COMPLETE CBC AUTOMATED: CPT | Performed by: INTERNAL MEDICINE

## 2020-10-29 PROCEDURE — 84443 ASSAY THYROID STIM HORMONE: CPT | Performed by: INTERNAL MEDICINE

## 2020-10-29 PROCEDURE — 99396 PREV VISIT EST AGE 40-64: CPT | Performed by: INTERNAL MEDICINE

## 2020-10-29 RX ORDER — ROSUVASTATIN CALCIUM 5 MG/1
5 TABLET, COATED ORAL DAILY
Qty: 90 TABLET | Refills: 1 | Status: SHIPPED | OUTPATIENT
Start: 2020-10-29 | End: 2021-01-19

## 2020-10-29 RX ORDER — LISINOPRIL 10 MG/1
10 TABLET ORAL DAILY
Qty: 90 TABLET | Refills: 3 | Status: SHIPPED | OUTPATIENT
Start: 2020-10-29 | End: 2020-11-04

## 2020-10-29 RX ORDER — LEVOTHYROXINE SODIUM 50 UG/1
50 TABLET ORAL DAILY
Qty: 90 TABLET | Refills: 3 | Status: SHIPPED | OUTPATIENT
Start: 2020-10-29 | End: 2020-12-28

## 2020-10-29 ASSESSMENT — MIFFLIN-ST. JEOR: SCORE: 1282.32

## 2020-10-29 NOTE — PROGRESS NOTES
SUBJECTIVE:   CC: Diane Ritter is an 60 year old woman who presents for preventive health visit.       Patient has been advised of split billing requirements and indicates understanding: Yes  Healthy Habits:    Getting at least 3 servings of Calcium per day:  Yes    Bi-annual eye exam:  Yes    Dental care twice a year:  NO    Sleep apnea or symptoms of sleep apnea:  Sleep apnea    Diet:  Regular (no restrictions)    Frequency of exercise:  6-7 days/week    Duration of exercise:  45-60 minutes    Taking medications regularly:  Yes    Barriers to taking medications:  None    Medication side effects:  None    PHQ-2 Total Score:    Additional concerns today:  No        Today's PHQ-2 Score:   PHQ-2 ( 1999 Pfizer) 10/22/2019   Q1: Little interest or pleasure in doing things 0   Q2: Feeling down, depressed or hopeless 0   PHQ-2 Score 0     Abuse: Current or Past (Physical, Sexual or Emotional) - No  Do you feel safe in your environment? Yes    Social History     Tobacco Use     Smoking status: Never Smoker     Smokeless tobacco: Never Used   Substance Use Topics     Alcohol use: No     If you drink alcohol do you typically have >3 drinks per day or >7 drinks per week? No    Alcohol Use 10/29/2020   Prescreen: >3 drinks/day or >7 drinks/week? No     Reviewed orders with patient.  Reviewed health maintenance and updated orders accordingly - Yes  Lab work is in process    Patient is doing well.  She had work-up for iron deficiency which include endoscopy and colonoscopy  Next step would be capsule endoscopy which she wants to do after pandemic is over  She is not having any active bleeding  She is tolerating iron very well  She always tries to eat healthy and do regular physical activity  She lost weight and gained few pounds due to current pandemic  She sees cardiologist  It was recommended to do echo every 2 years  She has bicuspid aortic valve  At this point she is doing well    Pertinent mammograms are reviewed  "under the imaging tab.  History of abnormal Pap smear: history of cervical cancer in situ  PAP / HPV Latest Ref Rng & Units 9/21/2018 11/13/2009   PAP - NIL NIL   HPV 16 DNA NEG:Negative Negative -   HPV 18 DNA NEG:Negative Negative -   OTHER HR HPV NEG:Negative Negative -     Reviewed and updated as needed this visit by clinical staff  Tobacco  Allergies  Meds              Reviewed and updated as needed this visit by Provider                    Review of Systems  CONSTITUTIONAL: NEGATIVE for fever, chills, change in weight  INTEGUMENTARY/SKIN: NEGATIVE for worrisome rashes, moles or lesions  EYES: NEGATIVE for vision changes or irritation  ENT: NEGATIVE for ear, mouth and throat problems  RESP: NEGATIVE for significant cough or SOB  BREAST: NEGATIVE for masses, tenderness or discharge  CV: NEGATIVE for chest pain, palpitations or peripheral edema  GI: NEGATIVE for nausea, abdominal pain, heartburn, or change in bowel habits  : NEGATIVE for unusual urinary or vaginal symptoms. No vaginal bleeding.  MUSCULOSKELETAL: NEGATIVE for significant arthralgias or myalgia  NEURO: NEGATIVE for weakness, dizziness or paresthesias  PSYCHIATRIC: NEGATIVE for changes in mood or affect      OBJECTIVE:   /70   Pulse 70   Temp 97.5  F (36.4  C) (Oral)   Ht 1.664 m (5' 5.5\")   Wt 71.4 kg (157 lb 4.8 oz)   SpO2 99%   BMI 25.78 kg/m    Physical Exam  GENERAL: healthy, alert and no distress  EYES: Eyes grossly normal to inspection, PERRL and conjunctivae and sclerae normal  HENT: ear canals and TM's normal, nose and mouth without ulcers or lesions  NECK: no adenopathy, no asymmetry, masses, or scars and thyroid normal to palpation  RESP: lungs clear to auscultation - no rales, rhonchi or wheezes  BREAST: normal without masses, tenderness or nipple discharge and no palpable axillary masses or adenopathy  CV: regular rate and rhythm, normal S1 S2, no S3 or S4, no murmur, click or rub, no peripheral edema and peripheral " pulses strong  ABDOMEN: soft, nontender, no hepatosplenomegaly, no masses and bowel sounds normal  MS: no gross musculoskeletal defects noted, no edema  SKIN: no suspicious lesions or rashes  NEURO: Normal strength and tone, mentation intact and speech normal  PSYCH: mentation appears normal, affect normal/bright        ASSESSMENT/PLAN:   Diane was seen today for physical.    Diagnoses and all orders for this visit:    Routine general medical examination at a health care facility  Preventive health counseling was also done.  Up-to-date with her immunization  She already got the flu shot    Hypothyroidism, unspecified type  -     levothyroxine (SYNTHROID/LEVOTHROID) 50 MCG tablet; Take 1 tablet (50 mcg) by mouth daily for hypothyroidism.  -     TSH with free T4 reflex    Nonrheumatic aortic valve insufficiency  -     lisinopril (ZESTRIL) 10 MG tablet; Take 1 tablet (10 mg) by mouth daily for high Blood Pressure  Well controlled     Iron deficiency  -     Ferritin  -     CBC with platelets  Endoscopy and colonoscopy was done around February of this year  Next step is capsule endoscopy which she will get it done after the pandemic    Medication management  -     Comprehensive metabolic panel    Hyperlipidemia, unspecified hyperlipidemia type  -     Lipid panel reflex to direct LDL Fasting  If her cholesterol comes high she is in agreement of taking cholesterol-lowering medication    Aortic root dilatation (H)  Seen on echocardiogram  Follows cardiology  Due for next appointment after June 2021    Bicuspid aortic valve  See the note above    H/O vaginal hysterectomy  Comments:  history of cervical cancer in situ  Patient reported she has a history of's carcinoma in situ of cervix and had hysterectomy  Will do her Pap smear next year  Last Pap smear was done with HPV and was negative but due to her history will repeat in 3 years instead of 5      Patient has been advised of split billing requirements and indicates  "understanding: Yes  COUNSELING:  Reviewed preventive health counseling, as reflected in patient instructions       Regular exercise       Healthy diet/nutrition    Estimated body mass index is 25.78 kg/m  as calculated from the following:    Height as of this encounter: 1.664 m (5' 5.5\").    Weight as of this encounter: 71.4 kg (157 lb 4.8 oz).        She reports that she has never smoked. She has never used smokeless tobacco.      Counseling Resources:  ATP IV Guidelines  Pooled Cohorts Equation Calculator  Breast Cancer Risk Calculator  BRCA-Related Cancer Risk Assessment: FHS-7 Tool  FRAX Risk Assessment  ICSI Preventive Guidelines  Dietary Guidelines for Americans, 2010  USDA's MyPlate  ASA Prophylaxis  Lung CA Screening    Angelica Marcano MD  [facility]  "

## 2020-10-29 NOTE — PATIENT INSTRUCTIONS
Endoscopy and colonoscopy is done   We should get capsule endoscopy in future at your convenience for work of iron deficiency.  You will be due for cardiology follow up after 6/2021.  Follow up in one year for physical   Seek sooner medical attention if there is any worsening of symptoms or problems.        Preventive Health Recommendations  Female Ages 50 - 64    Yearly exam: See your health care provider every year in order to  o Review health changes.   o Discuss preventive care.    o Review your medicines if your doctor has prescribed any.      Get a Pap test every three years (unless you have an abnormal result and your provider advises testing more often).    If you get Pap tests with HPV test, you only need to test every 5 years, unless you have an abnormal result.     You do not need a Pap test if your uterus was removed (hysterectomy) and you have not had cancer.    You should be tested each year for STDs (sexually transmitted diseases) if you're at risk.     Have a mammogram every 1 to 2 years.    Have a colonoscopy at age 50, or have a yearly FIT test (stool test). These exams screen for colon cancer.      Have a cholesterol test every 5 years, or more often if advised.    Have a diabetes test (fasting glucose) every three years. If you are at risk for diabetes, you should have this test more often.     If you are at risk for osteoporosis (brittle bone disease), think about having a bone density scan (DEXA).    Shots: Get a flu shot each year. Get a tetanus shot every 10 years.    Nutrition:     Eat at least 5 servings of fruits and vegetables each day.    Eat whole-grain bread, whole-wheat pasta and brown rice instead of white grains and rice.    Get adequate Calcium and Vitamin D.     Lifestyle    Exercise at least 150 minutes a week (30 minutes a day, 5 days a week). This will help you control your weight and prevent disease.    Limit alcohol to one drink per day.    No smoking.     Wear sunscreen to  prevent skin cancer.     See your dentist every six months for an exam and cleaning.    See your eye doctor every 1 to 2 years.

## 2020-10-29 NOTE — PROGRESS NOTES
"   SUBJECTIVE:   CC: Diane Ritter is an 60 year old woman who presents for preventive health visit.     {Split Bill scripting  The purpose of this visit is to discuss your medical history and prevent health problems before you are sick. You may be responsible for a co-pay, coinsurance, or deductible if your visit today includes services such as checking on a sore throat, having an x-ray or lab test, or treating and evaluating a new or existing condition :867692}  Patient has been advised of split billing requirements and indicates understanding: {Yes and No:014119}  Healthy Habits:    Do you get at least three servings of calcium containing foods daily (dairy, green leafy vegetables, etc.)? { :987131::\"yes\"}    Amount of exercise or daily activities, outside of work: { :713854}    Problems taking medications regularly { :997446::\"No\"}    Medication side effects: { :845850::\"No\"}    Have you had an eye exam in the past two years? { :399621}    Do you see a dentist twice per year? { :651064}    Do you have sleep apnea, excessive snoring or daytime drowsiness?{ :518938}  {Outside tests to abstract? :450799}    {additional problems to add (Optional):837037}    Today's PHQ-2 Score:   PHQ-2 ( 1999 [employer]) 10/22/2019 9/21/2018   Q1: Little interest or pleasure in doing things 0 0   Q2: Feeling down, depressed or hopeless 0 0   PHQ-2 Score 0 0     {PHQ-2 LOOK IN ASSESSMENTS (Optional) :771976}  Abuse: Current or Past(Physical, Sexual or Emotional)- {YES/NO/NA:684691}  Do you feel safe in your environment? {YES/NO/NA:012215}        Social History     Tobacco Use     Smoking status: Never Smoker     Smokeless tobacco: Never Used   Substance Use Topics     Alcohol use: No     If you drink alcohol do you typically have >3 drinks per day or >7 drinks per week? {ETOH :697885}                     Reviewed orders with patient.  Reviewed health maintenance and updated orders accordingly - " "{Yes/No:739107::\"Yes\"}  {Chronicprobdata (Optional):471535}    {Mammo Decision Support (Optional):835661}    Pertinent mammograms are reviewed under the imaging tab.  History of abnormal Pap smear: {PAP HX:663293}  PAP / HPV Latest Ref Rng & Units 9/21/2018 11/13/2009   PAP - NIL NIL   HPV 16 DNA NEG:Negative Negative -   HPV 18 DNA NEG:Negative Negative -   OTHER HR HPV NEG:Negative Negative -     Reviewed and updated as needed this visit by clinical staff                 Reviewed and updated as needed this visit by Provider                {HISTORY OPTIONS (Optional):200253}    ROS:  { :856794}    OBJECTIVE:   There were no vitals taken for this visit.  EXAM:  {Exam Choices:621581}    {Diagnostic Test Results (Optional):790006::\"Diagnostic Test Results:\",\"Labs reviewed in Epic\"}    ASSESSMENT/PLAN:   {Diag Picklist:579973}    Patient has been advised of split billing requirements and indicates understanding: {YES / NO:144700::\"Yes\"}  COUNSELING:   {FEMALE COUNSELING MESSAGES:418091::\"Reviewed preventive health counseling, as reflected in patient instructions\"}    Estimated body mass index is 24.3 kg/m  as calculated from the following:    Height as of 10/22/19: 1.664 m (5' 5.5\").    Weight as of 10/22/19: 67.3 kg (148 lb 4.8 oz).    {Weight Management Plan (ACO) Complete if BMI is abnormal-  Ages 18-64  BMI >24.9.  Age 65+ with BMI <23 or >30 (Optional):721701}    She reports that she has never smoked. She has never used smokeless tobacco.      Counseling Resources:  ATP IV Guidelines  Pooled Cohorts Equation Calculator  Breast Cancer Risk Calculator  BRCA-Related Cancer Risk Assessment: FHS-7 Tool  FRAX Risk Assessment  ICSI Preventive Guidelines  Dietary Guidelines for Americans, 2010  USDA's MyPlate  ASA Prophylaxis  Lung CA Screening    Angelica Marcano MD  [facility]  "

## 2020-10-30 ENCOUNTER — HOSPITAL ENCOUNTER (OUTPATIENT)
Dept: MAMMOGRAPHY | Facility: CLINIC | Age: 60
Discharge: HOME | End: 2020-10-30
Attending: INTERNAL MEDICINE | Admitting: INTERNAL MEDICINE
Payer: COMMERCIAL

## 2020-10-30 DIAGNOSIS — Z12.31 VISIT FOR SCREENING MAMMOGRAM: ICD-10-CM

## 2020-10-30 PROCEDURE — 77067 SCR MAMMO BI INCL CAD: CPT

## 2020-10-30 NOTE — RESULT ENCOUNTER NOTE
Carina Contreras,    This is to inform you regarding your test result.    Ferritin which is iron stores in the body is normal.  You can take iron 3 times a week only(Mon,Wed, Fri )    Sincerely,      Dr.Nasima Jeet MD,FACP

## 2020-10-30 NOTE — RESULT ENCOUNTER NOTE
Carina Contreras,    This is to inform you regarding your test result.    The testing of your blood sugar, kidney function, liver function and electrolytes was normal.      Sincerely,      Dr.Nasima Jeet MD,FACP

## 2020-10-30 NOTE — RESULT ENCOUNTER NOTE
Carina Contreras,    This is to inform you regarding your test result.    Your white count is lower than before   So we need to do close monitoring   I recommend repeating CBC in 2 months  Make lab only appointment   Other test results are pending.      Sincerely,      Dr.Nasima Jeet MD,FACP

## 2020-10-30 NOTE — RESULT ENCOUNTER NOTE
Carina Contreras,    This is to inform you regarding your test result.    Your total cholesterol is elevated.  HDL which is called good cholesterol is normal.  Your LDL which is called bad cholesterol is elevated.  Eat low cholesterol low fat  diet and do regular physical activity. Avoid high sugar containing food.  Start taking crestor 5 mg daily  Recheck lipid panel in 2 months  Your white count is lower than before   So we need to do close monitoring   I recommend repeating CBC in 2 months  Make lab only appointment   TSH which is thyroid hormone is normal.  Stay on current dose of levothyroxine.  Recheck TSH in 6 months  The testing of your blood sugar, kidney function, liver function and electrolytes was normal.  Ferritin which is iron stores in the body is normal.  You can take iron 3 times a week only(Mon,Wed, Fri )    Sincerely,      Dr.Nasima Jeet MD,FACP

## 2020-12-28 DIAGNOSIS — E03.9 HYPOTHYROIDISM, UNSPECIFIED TYPE: ICD-10-CM

## 2020-12-28 RX ORDER — LEVOTHYROXINE SODIUM 50 UG/1
TABLET ORAL
Qty: 90 TABLET | Refills: 3 | Status: SHIPPED | OUTPATIENT
Start: 2020-12-28 | End: 2021-10-29

## 2020-12-30 DIAGNOSIS — E78.5 HYPERLIPIDEMIA, UNSPECIFIED HYPERLIPIDEMIA TYPE: ICD-10-CM

## 2020-12-30 DIAGNOSIS — D72.819 LEUKOPENIA, UNSPECIFIED TYPE: ICD-10-CM

## 2020-12-30 DIAGNOSIS — E61.1 IRON DEFICIENCY: Primary | ICD-10-CM

## 2020-12-30 LAB
BASOPHILS # BLD AUTO: 0 10E9/L (ref 0–0.2)
BASOPHILS NFR BLD AUTO: 1.2 %
DIFFERENTIAL METHOD BLD: ABNORMAL
EOSINOPHIL # BLD AUTO: 0.1 10E9/L (ref 0–0.7)
EOSINOPHIL NFR BLD AUTO: 2 %
ERYTHROCYTE [DISTWIDTH] IN BLOOD BY AUTOMATED COUNT: 12.7 % (ref 10–15)
HCT VFR BLD AUTO: 36.8 % (ref 35–47)
HGB BLD-MCNC: 12.1 G/DL (ref 11.7–15.7)
LYMPHOCYTES # BLD AUTO: 0.8 10E9/L (ref 0.8–5.3)
LYMPHOCYTES NFR BLD AUTO: 31.5 %
MCH RBC QN AUTO: 32.1 PG (ref 26.5–33)
MCHC RBC AUTO-ENTMCNC: 32.9 G/DL (ref 31.5–36.5)
MCV RBC AUTO: 98 FL (ref 78–100)
MONOCYTES # BLD AUTO: 0.3 10E9/L (ref 0–1.3)
MONOCYTES NFR BLD AUTO: 11.6 %
NEUTROPHILS # BLD AUTO: 1.4 10E9/L (ref 1.6–8.3)
NEUTROPHILS NFR BLD AUTO: 53.7 %
PLATELET # BLD AUTO: 133 10E9/L (ref 150–450)
RBC # BLD AUTO: 3.77 10E12/L (ref 3.8–5.2)
WBC # BLD AUTO: 2.5 10E9/L (ref 4–11)

## 2020-12-30 PROCEDURE — 80061 LIPID PANEL: CPT | Performed by: INTERNAL MEDICINE

## 2020-12-30 PROCEDURE — 84460 ALANINE AMINO (ALT) (SGPT): CPT | Performed by: INTERNAL MEDICINE

## 2020-12-30 PROCEDURE — 36415 COLL VENOUS BLD VENIPUNCTURE: CPT | Performed by: INTERNAL MEDICINE

## 2020-12-30 PROCEDURE — 85025 COMPLETE CBC W/AUTO DIFF WBC: CPT | Performed by: INTERNAL MEDICINE

## 2020-12-30 PROCEDURE — 82728 ASSAY OF FERRITIN: CPT | Performed by: INTERNAL MEDICINE

## 2020-12-31 ENCOUNTER — TELEPHONE (OUTPATIENT)
Dept: FAMILY MEDICINE | Facility: CLINIC | Age: 60
End: 2020-12-31

## 2020-12-31 DIAGNOSIS — D72.819 LEUKOPENIA, UNSPECIFIED TYPE: Primary | ICD-10-CM

## 2020-12-31 LAB
ALT SERPL W P-5'-P-CCNC: 36 U/L (ref 0–50)
CHOLEST SERPL-MCNC: 161 MG/DL
FERRITIN SERPL-MCNC: 74 NG/ML (ref 8–252)
HDLC SERPL-MCNC: 68 MG/DL
LDLC SERPL CALC-MCNC: 82 MG/DL
NONHDLC SERPL-MCNC: 93 MG/DL
TRIGL SERPL-MCNC: 54 MG/DL

## 2021-01-01 NOTE — RESULT ENCOUNTER NOTE
Carina Contreras,    This is to inform you regarding your test result.    Ferritin which is iron stores in the body is satisfactory .  Continue iron supplement 3 times a week on (Mon, wed, Friday)    Sincerely,      Dr.Nasima Jeet MD,FACP

## 2021-01-04 ENCOUNTER — TELEPHONE (OUTPATIENT)
Dept: ONCOLOGY | Facility: CLINIC | Age: 61
End: 2021-01-04

## 2021-01-05 NOTE — TELEPHONE ENCOUNTER
RECORDS STATUS - ALL OTHER DIAGNOSIS      RECORDS RECEIVED FROM: [facility]   DATE RECEIVED: 2/16/2021   NOTES STATUS DETAILS   OFFICE NOTE from referring provider Complete Angelica Marcano MD   OFFICE NOTE from medical oncologist N/A [facility] Records from 2/12/2020 are in [facility]   DISCHARGE SUMMARY from hospital N/A    DISCHARGE REPORT from the ER     OPERATIVE REPORT N/A    MEDICATION LIST Complete [facility]   CLINICAL TRIAL TREATMENTS TO DATE     LABS     PATHOLOGY REPORTS     ANYTHING RELATED TO DIAGNOSIS Complete Labs last updated on 12/31/2020    GENONOMIC TESTING     TYPE:     IMAGING (NEED IMAGES & REPORT)     CT SCANS     MRI     MAMMO     ULTRASOUND     PET       Action    Action Taken 1/5/2021 9:46am     I called pt Diane - her phone went straight to .

## 2021-01-19 ENCOUNTER — TELEPHONE (OUTPATIENT)
Dept: FAMILY MEDICINE | Facility: CLINIC | Age: 61
End: 2021-01-19

## 2021-01-19 DIAGNOSIS — E78.5 HYPERLIPIDEMIA, UNSPECIFIED HYPERLIPIDEMIA TYPE: ICD-10-CM

## 2021-01-19 RX ORDER — ROSUVASTATIN CALCIUM 5 MG/1
5 TABLET, COATED ORAL DAILY
Qty: 90 TABLET | Refills: 2 | Status: SHIPPED | OUTPATIENT
Start: 2021-01-19 | End: 2021-09-29

## 2021-01-19 NOTE — TELEPHONE ENCOUNTER
Call from patient     crestor - labs good   Advised to continue on it     NEW PHARMACY     Per patient request sent to new pharmacy     Skyla EMMANUEL RN

## 2021-02-16 ENCOUNTER — PRE VISIT (OUTPATIENT)
Dept: ONCOLOGY | Facility: CLINIC | Age: 61
End: 2021-02-16

## 2021-02-16 ENCOUNTER — VIRTUAL VISIT (OUTPATIENT)
Dept: ONCOLOGY | Facility: CLINIC | Age: 61
End: 2021-02-16
Attending: INTERNAL MEDICINE
Payer: COMMERCIAL

## 2021-02-16 DIAGNOSIS — D69.6 THROMBOCYTOPENIA (H): ICD-10-CM

## 2021-02-16 DIAGNOSIS — D72.819 LEUKOPENIA, UNSPECIFIED TYPE: Primary | ICD-10-CM

## 2021-02-16 PROCEDURE — 999N001193 HC VIDEO/TELEPHONE VISIT; NO CHARGE

## 2021-02-16 PROCEDURE — 99204 OFFICE O/P NEW MOD 45 MIN: CPT | Mod: 95 | Performed by: INTERNAL MEDICINE

## 2021-02-16 ASSESSMENT — PAIN SCALES - GENERAL: PAINLEVEL: NO PAIN (0)

## 2021-02-16 NOTE — PROGRESS NOTES
Diane is a 60 year old who is being evaluated via a billable video visit.      How would you like to obtain your AVS? [employer]  If the video visit is dropped, the invitation should be resent by: Text to cell phone: - [phone]    Send email invite to:  [email or web address]    Will anyone else be joining your video visit? No        Video-Visit Details    Type of service:  Video Visit    Originating Location (pt. Location): Home    Distant Location (provider location):  [facility]     Platform used for Video Visit: Glassdoor

## 2021-02-16 NOTE — PROGRESS NOTES
Visit Date:   2021      This consult has been requested by Dr. Marcano for cytopenia.      Ms. Ritter is a 60-year-old female with history of hypertension, hypercholesterolemia and hypothyroidism. CBC has been monitored for many years.  The patient has chronic leukopenia.  Recently, she developed thrombocytopenia.  Oncology is consulted for leukopenia and thrombocytopenia.  Her labs for the last few years were reviewed, including from [facility].   1.  CBC done in [employer] system on 2005 revealed WBC of 2.6.  Normal hemoglobin and platelets.   -Since then, multiple CBCs have been done and they have all revealed leukopenia. WBC have varied between 2.5 to 3.5.   2.  Multiple labs were done on 10/29/2020:   -WBC of 2.8, hemoglobin of 13.2, platelets of 154.  MCV normal at 95.   -CMP normal.   -TSH normal.   3.  On 2020, WBC of 2.5, hemoglobin of 12.1, platelet 133.  MCV of 98. Elevated MCH and MCHC. Neutrophil of 53%, lymphocyte of 31% and monocyte of 11%.   4.  Colonoscopy on 2020 was normal.      The patient is doing well.  She is asymptomatic.      REVIEW OF SYSTEMS:  No headache.  No dizziness.  No neck pain.  No chest pain.  No shortness of breath.  No abdominal pain.  No nausea.  No vomiting.  Appetite is good.  No urinary complaints.  No abnormal bleeding.  No fever, chills or night sweats.  No bleeding from any site.      All other review of systems negative.      ALLERGIES:  REVIEWED.       MEDICATIONS:  Reviewed.      PAST MEDICAL HISTORY:   1.  Hypothyroidism.   2.  Hyperlipidemia.   3.  Hypertension.   4.  Sleep apnea.   5.  Thyroid nodule.   6.  Appendectomy.   7.  Hysterectomy.      SOCIAL HISTORY:   -No history of smoking.   -No alcohol use.      FAMILY HISTORY:   -Father had liver cancer.   -The patient had 3 brothers.  One  of pancreatic cancer.   -She has 2 sisters.      PHYSICAL EXAMINATION:   GENERAL:  She is alert, oriented x 3.  She is not in any distress.    RESPIRATORY:  No cough.  No respiratory distress.    The rest of a comprehensive physical examination deferred due to public [facility] emergency video visit restrictions.      LABORATORY DATA:  Reviewed.      ASSESSMENT:    1.  A 60-year-old female with chronic leukopenia.   2.  Mild thrombocytopenia.   3.  Other medical problems including hypertension, hypothyroidism and hyperlipidemia.      RECOMMENDATION:   1.  Labs were reviewed with the patient.  I explained to her that she has chronic leukopenia at least since 2005.  Her leukopenia has not gotten worse.  Most importantly, she has not been getting any recurrent infection.  This is likely benign chronic leukopenia.  If it was pathological, I would have expected her WBC to have gotten worse in the last 15 years.  Her recent CBC reveals thrombocytopenia.  Previously, her platelets have been normal.  Her thrombocytopenia is mild.  Different causes of thrombocytopenia discussed.  Basic mechanism is decreased production or increased destruction. I did discuss regarding primary bone marrow pathology like MDS.      2.  Discussed regarding workup.  At this time, I am going to get labs including peripheral blood smear review, iron studies, folate, vitamin B12, rheumatoid factor, FENA, SPEP, LDH and other labs.  We also discussed regarding bone marrow biopsy.  We may have to consider doing it in the future.  That will be considered if her cytopenia gets worse.  If it is stable, we can simply monitor it.     3.  She had multiple questions, which were all answered.  I will see her a few days after the labs are done.      Thanks for the consult.     ADDENDUM: Labs done today (02/17/21) reviewed.  -Normal WBC of 12.3 and normal ANC of 2.8. Normal hemoglobin. Platelet better at 148.  -Normal iron and ferritin.  -Normal vitamin B12.  -Normal folate.  -Normal rheumatoid factor and VIDAL.  -Normal ESR.  -Normal SPEP.  -Blood smear reveals mild thrombocytopenia. Otherwise  normal.    Patient informed of labs. As CBC is better, no further work-up needed. Patient will follow up with her PCP and have her CBC monitored every 6 months.        JIGNA GANDARA MD             D: 2021   T: 2021   MT: HONEY      Name:     CADE GREENWOOD   MRN:      [id number]        Account:      [id number]   :      1960           Visit Date:   2021      Document: [id number]      Video time of 21 minutes.  Video started at 1:32 PM and stopped at 1:53 PM.  Another 20-minute is spent in review of chart and investigations.

## 2021-02-16 NOTE — PROGRESS NOTES
Visit Date:   2021      This consult has been requested by Dr. Marcano for cytopenia.      Ms. Ritter is a 60-year-old female with history of hypertension, hypercholesterolemia and hypothyroidism. CBC has been monitored for many years.  The patient has chronic leukopenia.  Recently, she developed thrombocytopenia.  Oncology is consulted for leukopenia and thrombocytopenia.  Her labs for the last few years were reviewed, including from [facility].   1.  CBC done in [employer] system on 2005 revealed WBC of 2.6.  Normal hemoglobin and platelets.   -Since then, multiple CBCs have been done and they have all revealed leukopenia. WBC have varied between 2.5 to 3.5.   2.  Multiple labs were done on 10/29/2020:   -WBC of 2.8, hemoglobin of 13.2, platelets of 154.  MCV normal at 95.   -CMP normal.   -TSH normal.   3.  On 2020, WBC of 2.5, hemoglobin of 12.1, platelet 133.  MCV of 98. Elevated MCH and MCHC. Neutrophil of 53%, lymphocyte of 31% and monocyte of 11%.   4.  Colonoscopy on 2020 was normal.      The patient is doing well.  She is asymptomatic.      REVIEW OF SYSTEMS:  No headache.  No dizziness.  No neck pain.  No chest pain.  No shortness of breath.  No abdominal pain.  No nausea.  No vomiting.  Appetite is good.  No urinary complaints.  No abnormal bleeding.  No fever, chills or night sweats.  No bleeding from any site.      All other review of systems negative.      ALLERGIES:  REVIEWED.       MEDICATIONS:  Reviewed.      PAST MEDICAL HISTORY:   1.  Hypothyroidism.   2.  Hyperlipidemia.   3.  Hypertension.   4.  Sleep apnea.   5.  Thyroid nodule.   6.  Appendectomy.   7.  Hysterectomy.      SOCIAL HISTORY:   -No history of smoking.   -No alcohol use.      FAMILY HISTORY:   -Father had liver cancer.   -The patient had 3 brothers.  One  of pancreatic cancer.   -She has 2 sisters.      PHYSICAL EXAMINATION:   GENERAL:  She is alert, oriented x 3.  She is not in any distress.    RESPIRATORY:  No cough.  No respiratory distress.    The rest of a comprehensive physical examination deferred due to public [facility] emergency video visit restrictions.      LABORATORY DATA:  Reviewed.      ASSESSMENT:    1.  A 60-year-old female with chronic leukopenia.   2.  Mild thrombocytopenia.   3.  Other medical problems including hypertension, hypothyroidism and hyperlipidemia.      RECOMMENDATION:   1.  Labs were reviewed with the patient.  I explained to her that she has chronic leukopenia at least since 2005.  Her leukopenia has not gotten worse.  Most importantly, she has not been getting any recurrent infection.  This is likely benign chronic leukopenia.  If it was pathological, I would have expected her WBC to have gotten worse in the last 15 years.  Her recent CBC reveals thrombocytopenia.  Previously, her platelets have been normal.  Her thrombocytopenia is mild.  Different causes of thrombocytopenia discussed.  Basic mechanism is decreased production or increased destruction. I did discuss regarding primary bone marrow pathology like MDS.      2.  Discussed regarding workup.  At this time, I am going to get labs including peripheral blood smear review, iron studies, folate, vitamin B12, rheumatoid factor, FENA, SPEP, LDH and other labs.  We also discussed regarding bone marrow biopsy.  We may have to consider doing it in the future.  That will be considered if her cytopenia gets worse.  If it is stable, we can simply monitor it.     3.  She had multiple questions, which were all answered.  I will see her a few days after the labs are done.      Thanks for the consult.     ADDENDUM: Labs done today (02/17/21) reviewed.  -Normal WBC of 12.3 and normal ANC of 2.8. Normal hemoglobin. Platelet better at 148.  -Normal iron and ferritin.  -Normal vitamin B12.  -Normal folate.  -Normal rheumatoid factor and VIDAL.  -Normal ESR.  -Normal SPEP.  -Blood smear reveals mild thrombocytopenia. Otherwise  normal.    Patient informed of labs. As CBC is better, no further work-up needed. Patient will follow up with her PCP and have her CBC monitored every 6 months.        JIGNA GANDARA MD             D: 2021   T: 2021   MT: HONEY      Name:     CADE GREENWOOD   MRN:      [id number]        Account:      [id number]   :      1960           Visit Date:   2021      Document: [id number]      Video time of 21 minutes.  Video started at 1:32 PM and stopped at 1:53 PM.  Another 20-minute is spent in review of chart and investigations.

## 2021-02-16 NOTE — LETTER
2/16/2021         RE: Diane Ritter  [address] [place] [address]        Dear Colleague,    Thank you for referring your patient, Diane Ritter, to the [facility]. Please see a copy of my visit note below.    Diane is a 60 year old who is being evaluated via a billable video visit.      How would you like to obtain your AVS? MyChart  If the video visit is dropped, the invitation should be resent by: Text to cell phone: - [phone]    Send email invite to:  [email or web address]    Will anyone else be joining your video visit? No        Video-Visit Details    Type of service:  Video Visit    Originating Location (pt. Location): Home    Distant Location (provider location):  [facility]     Platform used for Video Visit: [employer]      Visit Date:   02/16/2021      This consult has been requested by Dr. Marcano for cytopenia.      Ms. Ritter is a 60-year-old female with history of hypertension, hypercholesterolemia and hypothyroidism. CBC has been monitored for many years.  The patient has chronic leukopenia.  Recently, she developed thrombocytopenia.  Oncology is consulted for leukopenia and thrombocytopenia.  Her labs for the last few years were reviewed, including from [facility].   1.  CBC done in [facility] [facility] system on 08/02/2005 revealed WBC of 2.6.  Normal hemoglobin and platelets.   -Since then, multiple CBCs have been done and they have all revealed leukopenia. WBC have varied between 2.5 to 3.5.   2.  Multiple labs were done on 10/29/2020:   -WBC of 2.8, hemoglobin of 13.2, platelets of 154.  MCV normal at 95.   -CMP normal.   -TSH normal.   3.  On 12/30/2020, WBC of 2.5, hemoglobin of 12.1, platelet 133.  MCV of 98. Elevated MCH and MCHC. Neutrophil of 53%, lymphocyte of 31% and monocyte of 11%.   4.  Colonoscopy on 02/12/2020 was normal.      The patient is doing well.  She is asymptomatic.      REVIEW OF SYSTEMS:  No headache.  No  dizziness.  No neck pain.  No chest pain.  No shortness of breath.  No abdominal pain.  No nausea.  No vomiting.  Appetite is good.  No urinary complaints.  No abnormal bleeding.  No fever, chills or night sweats.  No bleeding from any site.      All other review of systems negative.      ALLERGIES:  REVIEWED.       MEDICATIONS:  Reviewed.      PAST MEDICAL HISTORY:   1.  Hypothyroidism.   2.  Hyperlipidemia.   3.  Hypertension.   4.  Sleep apnea.   5.  Thyroid nodule.   6.  Appendectomy.   7.  Hysterectomy.      SOCIAL HISTORY:   -No history of smoking.   -No alcohol use.      FAMILY HISTORY:   -Father had liver cancer.   -The patient had 3 brothers.  One  of pancreatic cancer.   -She has 2 sisters.      PHYSICAL EXAMINATION:   GENERAL:  She is alert, oriented x 3.  She is not in any distress.   RESPIRATORY:  No cough.  No respiratory distress.    The rest of a comprehensive physical examination deferred due to [facility] emergency video visit restrictions.      LABORATORY DATA:  Reviewed.      ASSESSMENT:    1.  A 60-year-old female with chronic leukopenia.   2.  Mild thrombocytopenia.   3.  Other medical problems including hypertension, hypothyroidism and hyperlipidemia.      RECOMMENDATION:   1.  Labs were reviewed with the patient.  I explained to her that she has chronic leukopenia at least since .  Her leukopenia has not gotten worse.  Most importantly, she has not been getting any recurrent infection.  This is likely benign chronic leukopenia.  If it was pathological, I would have expected her WBC to have gotten worse in the last 15 years.  Her recent CBC reveals thrombocytopenia.  Previously, her platelets have been normal.  Her thrombocytopenia is mild.  Different causes of thrombocytopenia discussed.  Basic mechanism is decreased production or increased destruction. I did discuss regarding primary bone marrow pathology like MDS.      2.  Discussed regarding workup.  At this time, I am going to  get labs including peripheral blood smear review, iron studies, folate, vitamin B12, rheumatoid factor, FENA, SPEP, LDH and other labs.  We also discussed regarding bone marrow biopsy.  We may have to consider doing it in the future.  That will be considered if her cytopenia gets worse.  If it is stable, we can simply monitor it.     3.  She had multiple questions, which were all answered.  I will see her a few days after the labs are done.      Thanks for the consult.     ADDENDUM: Labs done today (21) reviewed.  -Normal WBC of 12.3 and normal ANC of 2.8. Normal hemoglobin. Platelet better at 148.  -Normal iron and ferritin.  -Normal vitamin B12.  -Normal folate.  -Normal rheumatoid factor and VIDAL.  -Normal ESR.  -Normal SPEP.  -Blood smear reveals mild thrombocytopenia. Otherwise normal.    Patient informed of labs. As CBC is better, no further work-up needed. Patient will follow up with her PCP and have her CBC monitored every 6 months.        JIGNA GANDARA MD             D: 2021   T: 2021   MT: HONEY      Name:     CADE RITTER   MRN:      [id number]        Account:      [id number]   :      1960           Visit Date:   2021      Document: [id number]      Video time of 21 minutes.  Video started at 1:32 PM and stopped at 1:53 PM.  Another 20-minute is spent in review of chart and investigations.    Visit Date:   2021      This consult has been requested by Dr. Marcano for cytopenia.      Ms. Ritter is a 60-year-old female with history of hypertension, hypercholesterolemia and hypothyroidism. CBC has been monitored for many years.  The patient has chronic leukopenia.  Recently, she developed thrombocytopenia.  Oncology is consulted for leukopenia and thrombocytopenia.  Her labs for the last few years were reviewed, including from [facility].   1.  CBC done in [facility] system on 2005 revealed WBC of 2.6.  Normal hemoglobin and platelets.   -Since then,  multiple CBCs have been done and they have all revealed leukopenia. WBC have varied between 2.5 to 3.5.   2.  Multiple labs were done on 10/29/2020:   -WBC of 2.8, hemoglobin of 13.2, platelets of 154.  MCV normal at 95.   -CMP normal.   -TSH normal.   3.  On 2020, WBC of 2.5, hemoglobin of 12.1, platelet 133.  MCV of 98. Elevated MCH and MCHC. Neutrophil of 53%, lymphocyte of 31% and monocyte of 11%.   4.  Colonoscopy on 2020 was normal.      The patient is doing well.  She is asymptomatic.      REVIEW OF SYSTEMS:  No headache.  No dizziness.  No neck pain.  No chest pain.  No shortness of breath.  No abdominal pain.  No nausea.  No vomiting.  Appetite is good.  No urinary complaints.  No abnormal bleeding.  No fever, chills or night sweats.  No bleeding from any site.      All other review of systems negative.      ALLERGIES:  REVIEWED.       MEDICATIONS:  Reviewed.      PAST MEDICAL HISTORY:   1.  Hypothyroidism.   2.  Hyperlipidemia.   3.  Hypertension.   4.  Sleep apnea.   5.  Thyroid nodule.   6.  Appendectomy.   7.  Hysterectomy.      SOCIAL HISTORY:   -No history of smoking.   -No alcohol use.      FAMILY HISTORY:   -Father had liver cancer.   -The patient had 3 brothers.  One  of pancreatic cancer.   -She has 2 sisters.      PHYSICAL EXAMINATION:   GENERAL:  She is alert, oriented x 3.  She is not in any distress.   RESPIRATORY:  No cough.  No respiratory distress.    The rest of a comprehensive physical examination deferred due to public health emergency video visit restrictions.      LABORATORY DATA:  Reviewed.      ASSESSMENT:    1.  A 60-year-old female with chronic leukopenia.   2.  Mild thrombocytopenia.   3.  Other medical problems including hypertension, hypothyroidism and hyperlipidemia.      RECOMMENDATION:   1.  Labs were reviewed with the patient.  I explained to her that she has chronic leukopenia at least since .  Her leukopenia has not gotten worse.  Most importantly, she  has not been getting any recurrent infection.  This is likely benign chronic leukopenia.  If it was pathological, I would have expected her WBC to have gotten worse in the last 15 years.  Her recent CBC reveals thrombocytopenia.  Previously, her platelets have been normal.  Her thrombocytopenia is mild.  Different causes of thrombocytopenia discussed.  Basic mechanism is decreased production or increased destruction. I did discuss regarding primary bone marrow pathology like MDS.      2.  Discussed regarding workup.  At this time, I am going to get labs including peripheral blood smear review, iron studies, folate, vitamin B12, rheumatoid factor, FENA, SPEP, LDH and other labs.  We also discussed regarding bone marrow biopsy.  We may have to consider doing it in the future.  That will be considered if her cytopenia gets worse.  If it is stable, we can simply monitor it.     3.  She had multiple questions, which were all answered.  I will see her a few days after the labs are done.      Thanks for the consult.     ADDENDUM: Labs done today (21) reviewed.  -Normal WBC of 12.3 and normal ANC of 2.8. Normal hemoglobin. Platelet better at 148.  -Normal iron and ferritin.  -Normal vitamin B12.  -Normal folate.  -Normal rheumatoid factor and VIDAL.  -Normal ESR.  -Normal SPEP.  -Blood smear reveals mild thrombocytopenia. Otherwise normal.    Patient informed of labs. As CBC is better, no further work-up needed. Patient will follow up with her PCP and have her CBC monitored every 6 months.        JIGNA GANDARA MD             D: 2021   T: 2021   MT: HONEY      Name:     CADE GREENWOOD   MRN:      [id number]        Account:      [id number]   :      1960           Visit Date:   2021      Document: [id number]      Video time of 21 minutes.  Video started at 1:32 PM and stopped at 1:53 PM.  Another 20-minute is spent in review of chart and investigations.          Again, thank you for allowing  me to participate in the care of your patient.        Sincerely,        Yosvany Beck MD

## 2021-02-16 NOTE — LETTER
2/16/2021         RE: Diane Ritter  [address] [place] [address]        Dear Colleague,    Thank you for referring your patient, Diane Ritter, to the [facility]. Please see a copy of my visit note below.    Diane is a 60 year old who is being evaluated via a billable video visit.      How would you like to obtain your AVS? MyChart  If the video visit is dropped, the invitation should be resent by: Text to cell phone: - [phone]    Send email invite to:  [email or web address]    Will anyone else be joining your video visit? No        Video-Visit Details    Type of service:  Video Visit    Originating Location (pt. Location): Home    Distant Location (provider location):  [facility]     Platform used for Video Visit: [employer]      Visit Date:   02/16/2021      This consult has been requested by Dr. Marcano for cytopenia.      Ms. Ritter is a 60-year-old female with history of hypertension, hypercholesterolemia and hypothyroidism. CBC has been monitored for many years.  The patient has chronic leukopenia.  Recently, she developed thrombocytopenia.  Oncology is consulted for leukopenia and thrombocytopenia.  Her labs for the last few years were reviewed, including from [facility].   1.  CBC done in [facility] system on 08/02/2005 revealed WBC of 2.6.  Normal hemoglobin and platelets.   -Since then, multiple CBCs have been done and they have all revealed leukopenia. WBC have varied between 2.5 to 3.5.   2.  Multiple labs were done on 10/29/2020:   -WBC of 2.8, hemoglobin of 13.2, platelets of 154.  MCV normal at 95.   -CMP normal.   -TSH normal.   3.  On 12/30/2020, WBC of 2.5, hemoglobin of 12.1, platelet 133.  MCV of 98. Elevated MCH and MCHC. Neutrophil of 53%, lymphocyte of 31% and monocyte of 11%.   4.  Colonoscopy on 02/12/2020 was normal.      The patient is doing well.  She is asymptomatic.      REVIEW OF SYSTEMS:  No headache.  No  dizziness.  No neck pain.  No chest pain.  No shortness of breath.  No abdominal pain.  No nausea.  No vomiting.  Appetite is good.  No urinary complaints.  No abnormal bleeding.  No fever, chills or night sweats.  No bleeding from any site.      All other review of systems negative.      ALLERGIES:  REVIEWED.       MEDICATIONS:  Reviewed.      PAST MEDICAL HISTORY:   1.  Hypothyroidism.   2.  Hyperlipidemia.   3.  Hypertension.   4.  Sleep apnea.   5.  Thyroid nodule.   6.  Appendectomy.   7.  Hysterectomy.      SOCIAL HISTORY:   -No history of smoking.   -No alcohol use.      FAMILY HISTORY:   -Father had liver cancer.   -The patient had 3 brothers.  One  of pancreatic cancer.   -She has 2 sisters.      PHYSICAL EXAMINATION:   GENERAL:  She is alert, oriented x 3.  She is not in any distress.   RESPIRATORY:  No cough.  No respiratory distress.    The rest of a comprehensive physical examination deferred due to [facility] emergency video visit restrictions.      LABORATORY DATA:  Reviewed.      ASSESSMENT:    1.  A 60-year-old female with chronic leukopenia.   2.  Mild thrombocytopenia.   3.  Other medical problems including hypertension, hypothyroidism and hyperlipidemia.      RECOMMENDATION:   1.  Labs were reviewed with the patient.  I explained to her that she has chronic leukopenia at least since .  Her leukopenia has not gotten worse.  Most importantly, she has not been getting any recurrent infection.  This is likely benign chronic leukopenia.  If it was pathological, I would have expected her WBC to have gotten worse in the last 15 years.  Her recent CBC reveals thrombocytopenia.  Previously, her platelets have been normal.  Her thrombocytopenia is mild.  Different causes of thrombocytopenia discussed.  Basic mechanism is decreased production or increased destruction. I did discuss regarding primary bone marrow pathology like MDS.      2.  Discussed regarding workup.  At this time, I am going to  get labs including peripheral blood smear review, iron studies, folate, vitamin B12, rheumatoid factor, FENA, SPEP, LDH and other labs.  We also discussed regarding bone marrow biopsy.  We may have to consider doing it in the future.  That will be considered if her cytopenia gets worse.  If it is stable, we can simply monitor it.     3.  She had multiple questions, which were all answered.  I will see her a few days after the labs are done.      Thanks for the consult.     ADDENDUM: Labs done today (21) reviewed.  -Normal WBC of 12.3 and normal ANC of 2.8. Normal hemoglobin. Platelet better at 148.  -Normal iron and ferritin.  -Normal vitamin B12.  -Normal folate.  -Normal rheumatoid factor and VIDAL.  -Normal ESR.  -Normal SPEP.  -Blood smear reveals mild thrombocytopenia. Otherwise normal.    Patient informed of labs. As CBC is better, no further work-up needed. Patient will follow up with her PCP and have her CBC monitored every 6 months.        JIGNA GANDARA MD             D: 2021   T: 2021   MT: HONEY      Name:     CADE RITTER   MRN:      [id number]        Account:      [id number]   :      1960           Visit Date:   2021      Document: [id number]      Video time of 21 minutes.  Video started at 1:32 PM and stopped at 1:53 PM.  Another 20-minute is spent in review of chart and investigations.    Visit Date:   2021      This consult has been requested by Dr. Marcano for cytopenia.      Ms. Ritter is a 60-year-old female with history of hypertension, hypercholesterolemia and hypothyroidism. CBC has been monitored for many years.  The patient has chronic leukopenia.  Recently, she developed thrombocytopenia.  Oncology is consulted for leukopenia and thrombocytopenia.  Her labs for the last few years were reviewed, including from [facility].   1.  CBC done in [facility] system on 2005 revealed WBC of 2.6.  Normal hemoglobin and platelets.   -Since then,  multiple CBCs have been done and they have all revealed leukopenia. WBC have varied between 2.5 to 3.5.   2.  Multiple labs were done on 10/29/2020:   -WBC of 2.8, hemoglobin of 13.2, platelets of 154.  MCV normal at 95.   -CMP normal.   -TSH normal.   3.  On 2020, WBC of 2.5, hemoglobin of 12.1, platelet 133.  MCV of 98. Elevated MCH and MCHC. Neutrophil of 53%, lymphocyte of 31% and monocyte of 11%.   4.  Colonoscopy on 2020 was normal.      The patient is doing well.  She is asymptomatic.      REVIEW OF SYSTEMS:  No headache.  No dizziness.  No neck pain.  No chest pain.  No shortness of breath.  No abdominal pain.  No nausea.  No vomiting.  Appetite is good.  No urinary complaints.  No abnormal bleeding.  No fever, chills or night sweats.  No bleeding from any site.      All other review of systems negative.      ALLERGIES:  REVIEWED.       MEDICATIONS:  Reviewed.      PAST MEDICAL HISTORY:   1.  Hypothyroidism.   2.  Hyperlipidemia.   3.  Hypertension.   4.  Sleep apnea.   5.  Thyroid nodule.   6.  Appendectomy.   7.  Hysterectomy.      SOCIAL HISTORY:   -No history of smoking.   -No alcohol use.      FAMILY HISTORY:   -Father had liver cancer.   -The patient had 3 brothers.  One  of pancreatic cancer.   -She has 2 sisters.      PHYSICAL EXAMINATION:   GENERAL:  She is alert, oriented x 3.  She is not in any distress.   RESPIRATORY:  No cough.  No respiratory distress.    The rest of a comprehensive physical examination deferred due to public health emergency video visit restrictions.      LABORATORY DATA:  Reviewed.      ASSESSMENT:    1.  A 60-year-old female with chronic leukopenia.   2.  Mild thrombocytopenia.   3.  Other medical problems including hypertension, hypothyroidism and hyperlipidemia.      RECOMMENDATION:   1.  Labs were reviewed with the patient.  I explained to her that she has chronic leukopenia at least since .  Her leukopenia has not gotten worse.  Most importantly, she  has not been getting any recurrent infection.  This is likely benign chronic leukopenia.  If it was pathological, I would have expected her WBC to have gotten worse in the last 15 years.  Her recent CBC reveals thrombocytopenia.  Previously, her platelets have been normal.  Her thrombocytopenia is mild.  Different causes of thrombocytopenia discussed.  Basic mechanism is decreased production or increased destruction. I did discuss regarding primary bone marrow pathology like MDS.      2.  Discussed regarding workup.  At this time, I am going to get labs including peripheral blood smear review, iron studies, folate, vitamin B12, rheumatoid factor, FENA, SPEP, LDH and other labs.  We also discussed regarding bone marrow biopsy.  We may have to consider doing it in the future.  That will be considered if her cytopenia gets worse.  If it is stable, we can simply monitor it.     3.  She had multiple questions, which were all answered.  I will see her a few days after the labs are done.      Thanks for the consult.     ADDENDUM: Labs done today (21) reviewed.  -Normal WBC of 12.3 and normal ANC of 2.8. Normal hemoglobin. Platelet better at 148.  -Normal iron and ferritin.  -Normal vitamin B12.  -Normal folate.  -Normal rheumatoid factor and VIDAL.  -Normal ESR.  -Normal SPEP.  -Blood smear reveals mild thrombocytopenia. Otherwise normal.    Patient informed of labs. As CBC is better, no further work-up needed. Patient will follow up with her PCP and have her CBC monitored every 6 months.        JIGNA GANDARA MD             D: 2021   T: 2021   MT: HONEY      Name:     CADE GREENWOOD   MRN:      [id number]        Account:      [id number]   :      1960           Visit Date:   2021      Document: [id number]      Video time of 21 minutes.  Video started at 1:32 PM and stopped at 1:53 PM.  Another 20-minute is spent in review of chart and investigations.          Again, thank you for allowing  me to participate in the care of your patient.        Sincerely,        Yosvany Beck MD

## 2021-02-17 DIAGNOSIS — D72.819 LEUKOPENIA, UNSPECIFIED TYPE: ICD-10-CM

## 2021-02-17 DIAGNOSIS — D69.6 THROMBOCYTOPENIA (H): ICD-10-CM

## 2021-02-17 LAB
BASOPHILS # BLD AUTO: 0 10E9/L (ref 0–0.2)
BASOPHILS NFR BLD AUTO: 0.5 %
DIFFERENTIAL METHOD BLD: ABNORMAL
EOSINOPHIL # BLD AUTO: 0 10E9/L (ref 0–0.7)
EOSINOPHIL NFR BLD AUTO: 0.7 %
ERYTHROCYTE [DISTWIDTH] IN BLOOD BY AUTOMATED COUNT: 12.5 % (ref 10–15)
ERYTHROCYTE [SEDIMENTATION RATE] IN BLOOD BY WESTERGREN METHOD: 15 MM/H (ref 0–30)
FOLATE SERPL-MCNC: 27.9 NG/ML
HCT VFR BLD AUTO: 37 % (ref 35–47)
HGB BLD-MCNC: 12.3 G/DL (ref 11.7–15.7)
LDH SERPL L TO P-CCNC: 154 U/L (ref 81–234)
LYMPHOCYTES # BLD AUTO: 0.8 10E9/L (ref 0.8–5.3)
LYMPHOCYTES NFR BLD AUTO: 18.9 %
MCH RBC QN AUTO: 31.5 PG (ref 26.5–33)
MCHC RBC AUTO-ENTMCNC: 33.2 G/DL (ref 31.5–36.5)
MCV RBC AUTO: 95 FL (ref 78–100)
MONOCYTES # BLD AUTO: 0.4 10E9/L (ref 0–1.3)
MONOCYTES NFR BLD AUTO: 10.6 %
NEUTROPHILS # BLD AUTO: 2.8 10E9/L (ref 1.6–8.3)
NEUTROPHILS NFR BLD AUTO: 69.3 %
PLATELET # BLD AUTO: 148 10E9/L (ref 150–450)
RBC # BLD AUTO: 3.9 10E12/L (ref 3.8–5.2)
RETICS # AUTO: 32 10E9/L (ref 25–95)
RETICS/RBC NFR AUTO: 0.8 % (ref 0.5–2)
VIT B12 SERPL-MCNC: 975 PG/ML (ref 193–986)
WBC # BLD AUTO: 4.1 10E9/L (ref 4–11)

## 2021-02-17 PROCEDURE — 85045 AUTOMATED RETICULOCYTE COUNT: CPT | Performed by: INTERNAL MEDICINE

## 2021-02-17 PROCEDURE — 84165 PROTEIN E-PHORESIS SERUM: CPT | Performed by: PATHOLOGY

## 2021-02-17 PROCEDURE — 86038 ANTINUCLEAR ANTIBODIES: CPT | Performed by: INTERNAL MEDICINE

## 2021-02-17 PROCEDURE — 85652 RBC SED RATE AUTOMATED: CPT | Performed by: INTERNAL MEDICINE

## 2021-02-17 PROCEDURE — 86431 RHEUMATOID FACTOR QUANT: CPT | Performed by: INTERNAL MEDICINE

## 2021-02-17 PROCEDURE — 99N1109 PR STATISTIC MORPHOLOGY W/INTERP HISTOLOGY TC 85060: Performed by: INTERNAL MEDICINE

## 2021-02-17 PROCEDURE — 83540 ASSAY OF IRON: CPT | Performed by: INTERNAL MEDICINE

## 2021-02-17 PROCEDURE — 83615 LACTATE (LD) (LDH) ENZYME: CPT | Performed by: INTERNAL MEDICINE

## 2021-02-17 PROCEDURE — 85060 BLOOD SMEAR INTERPRETATION: CPT | Performed by: PATHOLOGY

## 2021-02-17 PROCEDURE — 82728 ASSAY OF FERRITIN: CPT | Performed by: INTERNAL MEDICINE

## 2021-02-17 PROCEDURE — 83550 IRON BINDING TEST: CPT | Performed by: INTERNAL MEDICINE

## 2021-02-17 PROCEDURE — 85025 COMPLETE CBC W/AUTO DIFF WBC: CPT | Performed by: INTERNAL MEDICINE

## 2021-02-17 PROCEDURE — 99N1036 PR STATISTIC TOTAL PROTEIN: Performed by: INTERNAL MEDICINE

## 2021-02-17 PROCEDURE — 82746 ASSAY OF FOLIC ACID SERUM: CPT | Performed by: INTERNAL MEDICINE

## 2021-02-17 PROCEDURE — 36415 COLL VENOUS BLD VENIPUNCTURE: CPT | Performed by: INTERNAL MEDICINE

## 2021-02-17 PROCEDURE — 82607 VITAMIN B-12: CPT | Performed by: INTERNAL MEDICINE

## 2021-02-18 LAB
ALBUMIN SERPL ELPH-MCNC: 4.6 G/DL (ref 3.7–5.1)
ALPHA1 GLOB SERPL ELPH-MCNC: 0.3 G/DL (ref 0.2–0.4)
ALPHA2 GLOB SERPL ELPH-MCNC: 0.7 G/DL (ref 0.5–0.9)
ANA SER QL IF: NEGATIVE
B-GLOBULIN SERPL ELPH-MCNC: 0.6 G/DL (ref 0.6–1)
COPATH REPORT: NORMAL
FERRITIN SERPL-MCNC: 98 NG/ML (ref 8–252)
GAMMA GLOB SERPL ELPH-MCNC: 0.7 G/DL (ref 0.7–1.6)
IRON SATN MFR SERPL: 31 % (ref 15–46)
IRON SERPL-MCNC: 84 UG/DL (ref 35–180)
M PROTEIN SERPL ELPH-MCNC: 0 G/DL
PROT PATTERN SERPL ELPH-IMP: NORMAL
RHEUMATOID FACT SER NEPH-ACNC: <7 IU/ML (ref 0–20)
TIBC SERPL-MCNC: 268 UG/DL (ref 240–430)

## 2021-02-18 NOTE — RESULT ENCOUNTER NOTE
Dear Ms. Ritter,    Blood tests are better.  WBC is normal at 4.1.  Hemoglobin is normal.  Platelet is better at 148.    Please, call me with any questions.    Yosvany Beck MD

## 2021-02-19 ENCOUNTER — TELEPHONE (OUTPATIENT)
Dept: ONCOLOGY | Facility: CLINIC | Age: 61
End: 2021-02-19

## 2021-02-19 NOTE — TELEPHONE ENCOUNTER
Diane called clinic, she is aware  that her blood counts are normal. She is questioning if she needs her virtual visit on Monday 2/22/21 with . She stated that she could follow up with her primary care physician regarding future blood counts.Nini would like a call back with his response.  Message will be routed to Dr. Beck/KAYLEY

## 2021-02-19 NOTE — TELEPHONE ENCOUNTER
She can follow-up with her PCP.     Yosvany Beck MD       Called Diane with Dr. Beck's recommendation. Her appointment on Monday 2/22/21 will be canceled. Esha Becerra RN,BSN,OCN

## 2021-04-29 DIAGNOSIS — E03.9 HYPOTHYROIDISM, UNSPECIFIED TYPE: ICD-10-CM

## 2021-04-29 LAB — TSH SERPL DL<=0.005 MIU/L-ACNC: 1.63 MU/L (ref 0.4–4)

## 2021-04-29 PROCEDURE — 84443 ASSAY THYROID STIM HORMONE: CPT | Performed by: INTERNAL MEDICINE

## 2021-04-29 PROCEDURE — 36415 COLL VENOUS BLD VENIPUNCTURE: CPT | Performed by: INTERNAL MEDICINE

## 2021-04-30 NOTE — RESULT ENCOUNTER NOTE
Carina Contreras,    This is to inform you regarding your test result.    TSH which is thyroid hormone is normal.  Stay on current dose of levothyroxine.  Recheck TSH in 6 months      Sincerely,      Dr.Nasima Jeet MD,FACP

## 2021-07-13 ENCOUNTER — HOSPITAL ENCOUNTER (OUTPATIENT)
Dept: CARDIOLOGY | Facility: CLINIC | Age: 61
Discharge: HOME | End: 2021-07-13
Attending: INTERNAL MEDICINE | Admitting: INTERNAL MEDICINE
Payer: COMMERCIAL

## 2021-07-13 DIAGNOSIS — I35.9 AORTIC VALVE DISEASE: ICD-10-CM

## 2021-07-13 LAB — LVEF ECHO: NORMAL

## 2021-07-13 PROCEDURE — 93306 TTE W/DOPPLER COMPLETE: CPT

## 2021-07-13 PROCEDURE — 93306 TTE W/DOPPLER COMPLETE: CPT | Mod: 26 | Performed by: INTERNAL MEDICINE

## 2021-07-14 NOTE — RESULT ENCOUNTER NOTE
Echo results are stable.  Mild to mod AI, no stenosis.  EF normal.  Normal dimension of aortic root of 3.4 and ascending aorta of 3.6 cm.  Patient will see Dr. John in Sept.  I did call her with these results.

## 2021-08-17 ENCOUNTER — LAB (OUTPATIENT)
Dept: LAB | Facility: CLINIC | Age: 61
End: 2021-08-17
Payer: COMMERCIAL

## 2021-08-17 DIAGNOSIS — D72.819 LEUKOPENIA, UNSPECIFIED TYPE: ICD-10-CM

## 2021-08-17 LAB
ERYTHROCYTE [DISTWIDTH] IN BLOOD BY AUTOMATED COUNT: 13.1 % (ref 10–15)
HCT VFR BLD AUTO: 38.7 % (ref 35–47)
HGB BLD-MCNC: 13.2 G/DL (ref 11.7–15.7)
MCH RBC QN AUTO: 32.5 PG (ref 26.5–33)
MCHC RBC AUTO-ENTMCNC: 34.1 G/DL (ref 31.5–36.5)
MCV RBC AUTO: 95 FL (ref 78–100)
PLATELET # BLD AUTO: 161 10E3/UL (ref 150–450)
RBC # BLD AUTO: 4.06 10E6/UL (ref 3.8–5.2)
WBC # BLD AUTO: 4.5 10E3/UL (ref 4–11)

## 2021-08-17 PROCEDURE — 36415 COLL VENOUS BLD VENIPUNCTURE: CPT

## 2021-08-17 PROCEDURE — 85027 COMPLETE CBC AUTOMATED: CPT

## 2021-08-18 NOTE — RESULT ENCOUNTER NOTE
Carina Contreras,    This is to inform you regarding your test result.    CBC result which includes white count Hemoglobin and  Platelet Counts is normal.   Recheck in 6 months.    Sincerely,      Dr.Nasima Jeet MD,FACP

## 2021-09-05 ENCOUNTER — HEALTH MAINTENANCE LETTER (OUTPATIENT)
Age: 61
End: 2021-09-05

## 2021-09-07 ENCOUNTER — OFFICE VISIT (OUTPATIENT)
Dept: CARDIOLOGY | Facility: CLINIC | Age: 61
End: 2021-09-07
Payer: COMMERCIAL

## 2021-09-07 VITALS
HEART RATE: 65 BPM | DIASTOLIC BLOOD PRESSURE: 66 MMHG | HEIGHT: 66 IN | BODY MASS INDEX: 25.07 KG/M2 | SYSTOLIC BLOOD PRESSURE: 104 MMHG | WEIGHT: 156 LBS

## 2021-09-07 DIAGNOSIS — I35.9 AORTIC VALVE DISEASE: Primary | ICD-10-CM

## 2021-09-07 PROCEDURE — 99213 OFFICE O/P EST LOW 20 MIN: CPT | Performed by: INTERNAL MEDICINE

## 2021-09-07 ASSESSMENT — MIFFLIN-ST. JEOR: SCORE: 1271.42

## 2021-09-07 NOTE — PROGRESS NOTES
HPI and Plan:     Diane is a very pleasant 61-year-old who comes in for her visit every 2 years.  Been following her for aortic root enlargement as well as aortic insufficiency.  She continues to be active and is completely asymptomatic.  She denies any chest pain chest pressure shortness of breath heart racing palpitations or edema.    We personally reviewed her echocardiogram results.  Her aortic root interestingly is down to 3.4 and her aortic insufficiency is 1-2+.  I think this is meaning or at least has been no significant progression.  I would not put too much stock in the actual measurements of the aortic root on echocardiogram.  Probably off axis and foreshortened.  Her exam is stable.  Recommend follow-up in 2 years time.    Orders Placed This Encounter   Procedures     Follow-Up with Cardiologist     Echocardiogram Complete     No orders of the defined types were placed in this encounter.    There are no discontinued medications.      Encounter Diagnosis   Name Primary?     Aortic valve disease Yes       CURRENT MEDICATIONS:  Current Outpatient Medications   Medication Sig Dispense Refill     Ascorbic Acid (VITAMIN C PO) Take 250 mg by mouth daily       aspirin 81 MG tablet Take by mouth every other day        Cholecalciferol (VITAMIN D) 1000 UNITS capsule Take 1 capsule by mouth daily In winter       EPINEPHrine (EPIPEN) 0.3 MG/0.3ML injection Inject 0.3 mg into the muscle once as needed for anaphylaxis       FISH OIL 1,000 mg daily        levothyroxine (SYNTHROID/LEVOTHROID) 50 MCG tablet TAKE 1 TABLET DAILY FOR HYPOTHYROIDISM 90 tablet 3     lisinopril (ZESTRIL) 10 MG tablet TAKE 1 TABLET DAILY FOR HIGH BLOOD PRESSURE 90 tablet 3     Multiple Vitamin (DAILY MULTIVITAMIN PO) Take by mouth daily        rosuvastatin (CRESTOR) 5 MG tablet Take 1 tablet (5 mg) by mouth daily for cholestrol 90 tablet 2     scopolamine (TRANSDERM-SCOP, 1.5 MG,) 1 MG/3DAYS 72 hr patch Place 1 patch onto the skin every 72 hours  as needed For nausea 10 patch 1       ALLERGIES     Allergies   Allergen Reactions     Compazine Other (See Comments)     Facial tics     Shellfish Allergy Nausea and Vomiting and Rash     Sulfa Drugs Rash       PAST MEDICAL HISTORY:  Past Medical History:   Diagnosis Date     Aortic valve disorder-2-3 central aortic regugitation 3/26/2015     BHUMIKA (obstructive sleep apnea) 3/26/2015     Thyroid nodule      Venous insufficiency        PAST SURGICAL HISTORY:  Past Surgical History:   Procedure Laterality Date     APPENDECTOMY       GYN SURGERY      ovarian cysts removed      HYSTERECTOMY VAGINAL      cervical cancer     ORTHOPEDIC SURGERY  2010    hand surgery on right side       FAMILY HISTORY:  Family History   Problem Relation Age of Onset     Heart Disease Mother         mitral valve disorder??     Rheumatoid Arthritis Mother      Other - See Comments Father         abdominal aortic aneurysm     Hypertension Father      Colon Polyps Father      Diabetes Brother      Pancreatic Cancer Brother      Diabetes Brother      Hypertension Brother      Breast Cancer No family hx of        SOCIAL HISTORY:  Social History     Socioeconomic History     Marital status:      Spouse name: None     Number of children: None     Years of education: None     Highest education level: None   Occupational History     None   Tobacco Use     Smoking status: Never Smoker     Smokeless tobacco: Never Used   Substance and Sexual Activity     Alcohol use: No     Drug use: No     Sexual activity: Yes     Partners: Male   Other Topics Concern      Service Not Asked     Blood Transfusions Not Asked     Caffeine Concern No     Occupational Exposure Not Asked     Hobby Hazards Not Asked     Sleep Concern Yes     Comment: sleep apnea, wears oral device     Stress Concern No     Weight Concern Yes     Comment: weight loss 15# over last year     Special Diet Yes     Comment: low sugar, no processed foods, juicing     Back Care Not  "Asked     Exercise Yes     Comment: 3 days week, biking, elliptical     Bike Helmet Not Asked     Seat Belt Not Asked     Self-Exams Not Asked     Parent/sibling w/ CABG, MI or angioplasty before 65F 55M? Not Asked   Social History Narrative     None     Social Determinants of Health     Financial Resource Strain:      Difficulty of Paying Living Expenses:    Food Insecurity:      Worried About Running Out of Food in the Last Year:      Ran Out of Food in the Last Year:    Transportation Needs:      Lack of Transportation (Medical):      Lack of Transportation (Non-Medical):    Physical Activity:      Days of Exercise per Week:      Minutes of Exercise per Session:    Stress:      Feeling of Stress :    Social Connections:      Frequency of Communication with Friends and Family:      Frequency of Social Gatherings with Friends and Family:      Attends Holiness Services:      Active Member of Clubs or Organizations:      Attends Club or Organization Meetings:      Marital Status:    Intimate Partner Violence:      Fear of Current or Ex-Partner:      Emotionally Abused:      Physically Abused:      Sexually Abused:        Review of Systems:  Skin:  Negative     Eyes:  Positive for glasses  ENT:  Negative    Respiratory:  Positive for sleep apnea  Cardiovascular:  Negative;chest pain;lightheadedness;dizziness;syncope or near-syncope;cyanosis;exercise intolerance;fatigue Positive for;palpitations;edema  Gastroenterology: Negative    Genitourinary:  Negative    Musculoskeletal:  Positive for back pain  Neurologic:  Negative numbness or tingling of feet  Psychiatric:  Negative    Heme/Lymph/Imm:  Positive for    Endocrine:  Negative            Physical Exam:  Vitals: /66   Pulse 65   Ht 1.664 m (5' 5.5\")   Wt 70.8 kg (156 lb)   BMI 25.56 kg/m    Constitutional: cooperative, alert and oriented, well developed, well nourished, in no acute distress   Skin: warm and dry to the touch, no apparent skin lesions or " masses noted   Head: normocephalic, no masses or lesions   Eyes: pupils equal and round, conjunctivae and lids unremarkable, sclera white, no xanthalasma, EOMS intact, no nystagmus   ENT: no pallor or cyanosis, dentition good   Neck: carotid pulses are full and equal bilaterally, JVP normal, no carotid bruit, no thyromegaly   Chest: normal breath sounds, clear to auscultation, normal A-P diameter, normal symmetry, normal respiratory excursion, no use of accessory muscles   Cardiac: regular rhythm, normal S1/S2, no S3 or S4, apical impulse not displaced, no murmurs, gallops or rubs no presence of murmur   Abdomen: abdomen soft, non-tender, BS normoactive, no mass, no HSM, no bruits   Vascular: pulses full and equal, no bruits auscultated   Extremities and Back: no deformities, clubbing, cyanosis, erythema observed   Neurological: affect appropriate, oriented to time, person and place     Recent Lab Results:  LIPID RESULTS:  Lab Results   Component Value Date    CHOL 161 12/30/2020    HDL 68 12/30/2020    LDL 82 12/30/2020    TRIG 54 12/30/2020    CHOLHDLRATIO 3.1 12/04/2008       LIVER ENZYME RESULTS:  Lab Results   Component Value Date    AST 27 10/29/2020    ALT 36 12/30/2020       CBC RESULTS:  Lab Results   Component Value Date    WBC 4.5 08/17/2021    WBC 4.1 02/17/2021    RBC 4.06 08/17/2021    RBC 3.90 02/17/2021    HGB 13.2 08/17/2021    HGB 12.3 02/17/2021    HCT 38.7 08/17/2021    HCT 37.0 02/17/2021    MCV 95 08/17/2021    MCV 95 02/17/2021    MCH 32.5 08/17/2021    MCH 31.5 02/17/2021    MCHC 34.1 08/17/2021    MCHC 33.2 02/17/2021    RDW 13.1 08/17/2021    RDW 12.5 02/17/2021     08/17/2021     (L) 02/17/2021       BMP RESULTS:  Lab Results   Component Value Date     10/29/2020    POTASSIUM 4.0 10/29/2020    CHLORIDE 105 10/29/2020    CO2 31 10/29/2020    ANIONGAP 4 10/29/2020    GLC 86 10/29/2020    BUN 19 10/29/2020    CR 0.80 10/29/2020    GFRESTIMATED 79 10/29/2020    GFRESTBLACK  >90 10/29/2020    GRACE 9.7 10/29/2020        A1C RESULTS:  No results found for: A1C    INR RESULTS:  No results found for: INR        CC  No referring provider defined for this encounter.

## 2021-10-26 ASSESSMENT — ENCOUNTER SYMPTOMS
CONSTIPATION: 0
COUGH: 0
ABDOMINAL PAIN: 0
PALPITATIONS: 0
FEVER: 0
JOINT SWELLING: 0
MYALGIAS: 0
EYE PAIN: 0
WEAKNESS: 0
SORE THROAT: 0
DIZZINESS: 0
BREAST MASS: 0
HEARTBURN: 0
ARTHRALGIAS: 0
PARESTHESIAS: 0
SHORTNESS OF BREATH: 0
HEMATOCHEZIA: 0
NAUSEA: 0
DYSURIA: 0
DIARRHEA: 0
NERVOUS/ANXIOUS: 0
CHILLS: 0
FREQUENCY: 0
HEADACHES: 0
HEMATURIA: 0

## 2021-10-29 ENCOUNTER — OFFICE VISIT (OUTPATIENT)
Dept: FAMILY MEDICINE | Facility: CLINIC | Age: 61
End: 2021-10-29
Payer: COMMERCIAL

## 2021-10-29 VITALS
HEART RATE: 77 BPM | RESPIRATION RATE: 16 BRPM | HEIGHT: 66 IN | OXYGEN SATURATION: 100 % | SYSTOLIC BLOOD PRESSURE: 110 MMHG | TEMPERATURE: 97.7 F | BODY MASS INDEX: 25.07 KG/M2 | WEIGHT: 156 LBS | DIASTOLIC BLOOD PRESSURE: 67 MMHG

## 2021-10-29 DIAGNOSIS — Z79.899 MEDICATION MANAGEMENT: ICD-10-CM

## 2021-10-29 DIAGNOSIS — Z78.0 ASYMPTOMATIC POSTMENOPAUSAL STATUS: ICD-10-CM

## 2021-10-29 DIAGNOSIS — Q23.81 BICUSPID AORTIC VALVE: ICD-10-CM

## 2021-10-29 DIAGNOSIS — D72.819 LEUKOPENIA, UNSPECIFIED TYPE: ICD-10-CM

## 2021-10-29 DIAGNOSIS — Z12.4 SCREENING FOR MALIGNANT NEOPLASM OF CERVIX: ICD-10-CM

## 2021-10-29 DIAGNOSIS — Z00.00 ROUTINE GENERAL MEDICAL EXAMINATION AT A HEALTH CARE FACILITY: Primary | ICD-10-CM

## 2021-10-29 DIAGNOSIS — Z85.41 HISTORY OF CERVICAL CANCER: ICD-10-CM

## 2021-10-29 DIAGNOSIS — I77.810 AORTIC ROOT DILATATION (H): ICD-10-CM

## 2021-10-29 DIAGNOSIS — E78.5 HYPERLIPIDEMIA, UNSPECIFIED HYPERLIPIDEMIA TYPE: ICD-10-CM

## 2021-10-29 DIAGNOSIS — Z23 HIGH PRIORITY FOR 2019-NCOV VACCINE: ICD-10-CM

## 2021-10-29 DIAGNOSIS — E61.1 IRON DEFICIENCY: ICD-10-CM

## 2021-10-29 DIAGNOSIS — E03.9 HYPOTHYROIDISM, UNSPECIFIED TYPE: ICD-10-CM

## 2021-10-29 DIAGNOSIS — I35.1 NONRHEUMATIC AORTIC VALVE INSUFFICIENCY: ICD-10-CM

## 2021-10-29 DIAGNOSIS — R11.0 NAUSEA: ICD-10-CM

## 2021-10-29 DIAGNOSIS — Z90.710 H/O VAGINAL HYSTERECTOMY: ICD-10-CM

## 2021-10-29 DIAGNOSIS — Z86.0100 HISTORY OF COLONIC POLYPS: ICD-10-CM

## 2021-10-29 LAB
ALBUMIN SERPL-MCNC: 3.8 G/DL (ref 3.4–5)
ALP SERPL-CCNC: 64 U/L (ref 40–150)
ALT SERPL W P-5'-P-CCNC: 36 U/L (ref 0–50)
ANION GAP SERPL CALCULATED.3IONS-SCNC: 4 MMOL/L (ref 3–14)
AST SERPL W P-5'-P-CCNC: 19 U/L (ref 0–45)
BILIRUB SERPL-MCNC: 0.6 MG/DL (ref 0.2–1.3)
BUN SERPL-MCNC: 16 MG/DL (ref 7–30)
CALCIUM SERPL-MCNC: 10.2 MG/DL (ref 8.5–10.1)
CHLORIDE BLD-SCNC: 99 MMOL/L (ref 94–109)
CHOLEST SERPL-MCNC: 167 MG/DL
CO2 SERPL-SCNC: 29 MMOL/L (ref 20–32)
CREAT SERPL-MCNC: 0.78 MG/DL (ref 0.52–1.04)
ERYTHROCYTE [DISTWIDTH] IN BLOOD BY AUTOMATED COUNT: 12.7 % (ref 10–15)
FASTING STATUS PATIENT QL REPORTED: YES
FERRITIN SERPL-MCNC: 133 NG/ML (ref 8–252)
GFR SERPL CREATININE-BSD FRML MDRD: 81 ML/MIN/1.73M2
GLUCOSE BLD-MCNC: 92 MG/DL (ref 70–99)
HCT VFR BLD AUTO: 40 % (ref 35–47)
HDLC SERPL-MCNC: 71 MG/DL
HGB BLD-MCNC: 13.5 G/DL (ref 11.7–15.7)
LDLC SERPL CALC-MCNC: 85 MG/DL
MCH RBC QN AUTO: 32.4 PG (ref 26.5–33)
MCHC RBC AUTO-ENTMCNC: 33.8 G/DL (ref 31.5–36.5)
MCV RBC AUTO: 96 FL (ref 78–100)
NONHDLC SERPL-MCNC: 96 MG/DL
PLATELET # BLD AUTO: 185 10E3/UL (ref 150–450)
POTASSIUM BLD-SCNC: 4.7 MMOL/L (ref 3.4–5.3)
PROT SERPL-MCNC: 7.5 G/DL (ref 6.8–8.8)
RBC # BLD AUTO: 4.17 10E6/UL (ref 3.8–5.2)
SODIUM SERPL-SCNC: 132 MMOL/L (ref 133–144)
TRIGL SERPL-MCNC: 57 MG/DL
TSH SERPL DL<=0.005 MIU/L-ACNC: 1.98 MU/L (ref 0.4–4)
VIT B12 SERPL-MCNC: 1132 PG/ML (ref 193–986)
WBC # BLD AUTO: 3.4 10E3/UL (ref 4–11)

## 2021-10-29 PROCEDURE — 99396 PREV VISIT EST AGE 40-64: CPT | Mod: 25 | Performed by: INTERNAL MEDICINE

## 2021-10-29 PROCEDURE — 82728 ASSAY OF FERRITIN: CPT | Performed by: INTERNAL MEDICINE

## 2021-10-29 PROCEDURE — 85027 COMPLETE CBC AUTOMATED: CPT | Performed by: INTERNAL MEDICINE

## 2021-10-29 PROCEDURE — 0064A COVID-19,PF,MODERNA (18+ YRS BOOSTER .25ML): CPT | Performed by: INTERNAL MEDICINE

## 2021-10-29 PROCEDURE — 99214 OFFICE O/P EST MOD 30 MIN: CPT | Mod: 25 | Performed by: INTERNAL MEDICINE

## 2021-10-29 PROCEDURE — G0145 SCR C/V CYTO,THINLAYER,RESCR: HCPCS | Performed by: INTERNAL MEDICINE

## 2021-10-29 PROCEDURE — 80053 COMPREHEN METABOLIC PANEL: CPT | Performed by: INTERNAL MEDICINE

## 2021-10-29 PROCEDURE — 87624 HPV HI-RISK TYP POOLED RSLT: CPT | Performed by: INTERNAL MEDICINE

## 2021-10-29 PROCEDURE — 82607 VITAMIN B-12: CPT | Performed by: INTERNAL MEDICINE

## 2021-10-29 PROCEDURE — 84443 ASSAY THYROID STIM HORMONE: CPT | Performed by: INTERNAL MEDICINE

## 2021-10-29 PROCEDURE — 36415 COLL VENOUS BLD VENIPUNCTURE: CPT | Performed by: INTERNAL MEDICINE

## 2021-10-29 PROCEDURE — 80061 LIPID PANEL: CPT | Performed by: INTERNAL MEDICINE

## 2021-10-29 RX ORDER — LEVOTHYROXINE SODIUM 50 UG/1
50 TABLET ORAL DAILY
Qty: 90 TABLET | Refills: 3 | Status: SHIPPED | OUTPATIENT
Start: 2021-10-29 | End: 2022-11-04

## 2021-10-29 RX ORDER — SCOLOPAMINE TRANSDERMAL SYSTEM 1 MG/1
1 PATCH, EXTENDED RELEASE TRANSDERMAL
Qty: 10 PATCH | Refills: 1 | Status: SHIPPED | OUTPATIENT
Start: 2021-10-29 | End: 2023-04-12

## 2021-10-29 RX ORDER — ROSUVASTATIN CALCIUM 5 MG/1
5 TABLET, COATED ORAL DAILY
Qty: 90 TABLET | Refills: 3 | Status: SHIPPED | OUTPATIENT
Start: 2021-10-29 | End: 2022-12-01

## 2021-10-29 RX ORDER — LISINOPRIL 10 MG/1
10 TABLET ORAL DAILY
Qty: 90 TABLET | Refills: 3 | Status: SHIPPED | OUTPATIENT
Start: 2021-10-29 | End: 2022-10-25

## 2021-10-29 RX ORDER — FERROUS SULFATE 325(65) MG
TABLET ORAL
COMMUNITY
Start: 2019-11-04

## 2021-10-29 ASSESSMENT — ENCOUNTER SYMPTOMS
DYSURIA: 0
HEARTBURN: 0
HEMATURIA: 0
WEAKNESS: 0
CONSTIPATION: 0
PARESTHESIAS: 0
CHILLS: 0
DIARRHEA: 0
BREAST MASS: 0
PALPITATIONS: 0
NERVOUS/ANXIOUS: 0
JOINT SWELLING: 0
ARTHRALGIAS: 0
DIZZINESS: 0
EYE PAIN: 0
NAUSEA: 0
FREQUENCY: 0
FEVER: 0
HEMATOCHEZIA: 0
SHORTNESS OF BREATH: 0
MYALGIAS: 0
HEADACHES: 0
ABDOMINAL PAIN: 0
SORE THROAT: 0
COUGH: 0

## 2021-10-29 ASSESSMENT — MIFFLIN-ST. JEOR: SCORE: 1271.42

## 2021-10-29 NOTE — PROGRESS NOTES
SUBJECTIVE:   CC: Diane Ritter is an 61 year old woman who presents for preventive health visit.       Patient has been advised of split billing requirements and indicates understanding: Yes  Healthy Habits:     Getting at least 3 servings of Calcium per day:  Yes    Bi-annual eye exam:  Yes    Dental care twice a year:  Yes    Sleep apnea or symptoms of sleep apnea:  Sleep apnea    Diet:  Regular (no restrictions)    Frequency of exercise:  6-7 days/week    Duration of exercise:  30-45 minutes    Taking medications regularly:  Yes    Medication side effects:  Not applicable    PHQ-2 Total Score: 0    Additional concerns today:  No      Patient is doing really well  Would like to get her lab work done          Today's PHQ-2 Score:   PHQ-2 ( 1999 [employer]) 10/26/2021   Q1: Little interest or pleasure in doing things 0   Q2: Feeling down, depressed or hopeless 0   PHQ-2 Score 0   Q1: Little interest or pleasure in doing things Not at all   Q2: Feeling down, depressed or hopeless Not at all   PHQ-2 Score 0       Abuse: Current or Past (Physical, Sexual or Emotional) - No  Do you feel safe in your environment? Yes        Social History     Tobacco Use     Smoking status: Never Smoker     Smokeless tobacco: Never Used   Substance Use Topics     Alcohol use: No         Alcohol Use 10/26/2021   Prescreen: >3 drinks/day or >7 drinks/week? Not Applicable   Prescreen: >3 drinks/day or >7 drinks/week? -       Reviewed orders with patient.  Reviewed health maintenance and updated orders accordingly - Yes      Breast Cancer Screening:  Any new diagnosis of family breast, ovarian, or bowel cancer? No    FHS-7: No flowsheet data found.      Pertinent mammograms are reviewed under the imaging tab.    History of abnormal Pap smear: history of cervical cancer and hysterectomy in 1991  PAP / HPV Latest Ref Rng & Units 9/21/2018 11/13/2009   PAP (Historical) - NIL NIL   HPV16 NEG:Negative Negative -   HPV18 NEG:Negative Negative  "-   HRHPV NEG:Negative Negative -     Reviewed and updated as needed this visit by clinical staff  Tobacco  Allergies  Meds  Problems  Med Hx  Surg Hx  Fam Hx          Reviewed and updated as needed this visit by Provider                    Review of Systems   Constitutional: Negative for chills and fever.   HENT: Negative for congestion, ear pain, hearing loss and sore throat.    Eyes: Negative for pain and visual disturbance.   Respiratory: Negative for cough and shortness of breath.    Cardiovascular: Negative for chest pain, palpitations and peripheral edema.   Gastrointestinal: Negative for abdominal pain, constipation, diarrhea, heartburn, hematochezia and nausea.   Breasts:  Negative for tenderness, breast mass and discharge.   Genitourinary: Negative for dysuria, frequency, genital sores, hematuria, pelvic pain, urgency, vaginal bleeding and vaginal discharge.   Musculoskeletal: Negative for arthralgias, joint swelling and myalgias.   Skin: Negative for rash.   Neurological: Negative for dizziness, weakness, headaches and paresthesias.   Psychiatric/Behavioral: Negative for mood changes. The patient is not nervous/anxious.           OBJECTIVE:   /67 (BP Location: Right arm, Cuff Size: Adult Regular)   Pulse 77   Temp 97.7  F (36.5  C) (Tympanic)   Resp 16   Ht 1.664 m (5' 5.5\")   Wt 70.8 kg (156 lb)   SpO2 100%   BMI 25.56 kg/m    Physical Exam  GENERAL: healthy, alert and no distress  EYES: Eyes grossly normal to inspection, PERRL and conjunctivae and sclerae normal  HENT: ear canals and TM's normal, nose and mouth without ulcers or lesions  NECK: no adenopathy, no asymmetry, masses, or scars and thyroid normal to palpation  RESP: lungs clear to auscultation - no rales, rhonchi or wheezes  BREAST: normal without masses, tenderness or nipple discharge and no palpable axillary masses or adenopathy  CV: regular rate and rhythm, normal S1 S2, no S3 or S4, no murmur, click or rub, no " peripheral edema and peripheral pulses strong  ABDOMEN: soft, nontender, no hepatosplenomegaly, no masses and bowel sounds normal  Vertical scar in lower abdomen   (female): normal female external genitalia, normal urethral meatus, atrophic changes in vagina it was dry and pap test is performed   MS: no gross musculoskeletal defects noted, no edema  SKIN: no suspicious lesions or rashes  NEURO: Normal strength and tone, mentation intact and speech normal  PSYCH: mentation appears normal, affect normal/bright        ASSESSMENT/PLAN:   Diane was seen today for physical and imm/inj.    Diagnoses and all orders for this visit:    Routine general medical examination at a health care facility  Preventive health counseling was also done.  Colonoscopy was done on February 12, 2020  Is scheduled for mammogram on November, first 2021    Asymptomatic postmenopausal status  -     DX Hip/Pelvis/Spine; Future  Will order bone density    Screening for malignant neoplasm of cervix  -     Pap screen with HPV - recommended age 30 - 65 years  Patient has history of cervical cancer  She had vaginal hysterectomy  Due to history of cervical cancer we still do Pap smear    History of cervical cancer  -     Pap screen with HPV - recommended age 30 - 65 years  In 1991    H/O vaginal hysterectomy  -     Pap screen with HPV - recommended age 30 - 65 years  She had hysterectomy    History of colonic polyps  Last colonoscopy was on February 12, 2020    Hypothyroidism, unspecified type  -     levothyroxine (SYNTHROID/LEVOTHROID) 50 MCG tablet; Take 1 tablet (50 mcg) by mouth daily for thyroid  -     TSH with free T4 reflex; Future  -     TSH with free T4 reflex    Nausea  -     scopolamine (TRANSDERM-SCOP, 1.5 MG,) 1 MG/3DAYS 72 hr patch; Place 1 patch onto the skin every 72 hours as needed for nausea For nausea    Hyperlipidemia, unspecified hyperlipidemia type  -     rosuvastatin (CRESTOR) 5 MG tablet; Take 1 tablet (5 mg) by mouth daily for  cholestrol  -     Lipid panel reflex to direct LDL Fasting; Future  -     Lipid panel reflex to direct LDL Fasting    Leukopenia, unspecified type  -     CBC with platelets; Future  -     Vitamin B12; Future  -     CBC with platelets  -     Cancel: CBC with platelets  -     Vitamin B12  Chronic and is stable  See note of hematologist Dr. Beck on February 16, 2021  Work-up was negative  It was recommended to monitor CBC every 6 months    Iron deficiency  -     CBC with platelets; Future  -     Ferritin; Future  -     Vitamin B12; Future  -     Cancel: CBC with platelets  -     Ferritin  -     Vitamin B12    Nonrheumatic aortic valve insufficiency  Comments:  follows cardiology   see echo report done in 7/2021  Orders:  -     lisinopril (ZESTRIL) 10 MG tablet; Take 1 tablet (10 mg) by mouth daily for Blood Pressure    Aortic root dilatation (H)  Comments:  follows cardiology   see echo report done in 7/2021    Echo done on July 13, 2021 showed:    The aortic valve is bicuspid.  There is mild to moderate aortic regurgitation.  No aortic valve stenosis. Mean systolic gradient 4 mmHg.  The left ventricle is normal in size.  Left ventricular systolic function is normal. LVEF 60-65%.  The right ventricle is normal in size and function.  Normal dimension of aortic root (3.4 cm) and ascending aorta (3.6 cm).     Compared to the study dated 6/5/2019, aortic root measures 3.4 cm (reported as  4.2 cm on previous study).      Bicuspid aortic valve  Comments:  follows cardiology   see echo report done in 7/2021    Medication management  -     Comprehensive metabolic panel; Future  -     Comprehensive metabolic panel    High priority for 2019-nCoV vaccine  -     COVID-19,PF,MODERNA (18+ Yrs BOOSTER .25mL)  Agreed to get booster  Due to history of leukopenia she is considered high risk    Other orders  -     REVIEW OF HEALTH MAINTENANCE PROTOCOL ORDERS    Disclaimer: This note consists of symbols derived from keyboarding,  "dictation and/or voice recognition software. As a result, there may be errors in the script that have gone undetected. Please consider this when interpreting information found in this chart.      Patient has been advised of split billing requirements and indicates understanding: Yes  COUNSELING:  Reviewed preventive health counseling, as reflected in patient instructions       Regular exercise       Healthy diet/nutrition       Osteoporosis prevention/bone health    Estimated body mass index is 25.56 kg/m  as calculated from the following:    Height as of this encounter: 1.664 m (5' 5.5\").    Weight as of this encounter: 70.8 kg (156 lb).        She reports that she has never smoked. She has never used smokeless tobacco.      Counseling Resources:  ATP IV Guidelines  Pooled Cohorts Equation Calculator  Breast Cancer Risk Calculator  BRCA-Related Cancer Risk Assessment: FHS-7 Tool  FRAX Risk Assessment  ICSI Preventive Guidelines  Dietary Guidelines for Americans, 2010  USDA's MyPlate  ASA Prophylaxis  Lung CA Screening    Angelica Marcano MD  [facility]  "

## 2021-10-29 NOTE — PATIENT INSTRUCTIONS
Labs   moderna booster   Follow up in one year for physical   Seek sooner medical attention if there is any worsening of symptoms or problems.    Preventive Health Recommendations  Female Ages 50 - 64    Yearly exam: See your health care provider every year in order to  o Review health changes.   o Discuss preventive care.    o Review your medicines if your doctor has prescribed any.      Get a Pap test every three years (unless you have an abnormal result and your provider advises testing more often).    If you get Pap tests with HPV test, you only need to test every 5 years, unless you have an abnormal result.     You do not need a Pap test if your uterus was removed (hysterectomy) and you have not had cancer.    You should be tested each year for STDs (sexually transmitted diseases) if you're at risk.     Have a mammogram every 1 to 2 years.    Have a colonoscopy at age 50, or have a yearly FIT test (stool test). These exams screen for colon cancer.      Have a cholesterol test every 5 years, or more often if advised.    Have a diabetes test (fasting glucose) every three years. If you are at risk for diabetes, you should have this test more often.     If you are at risk for osteoporosis (brittle bone disease), think about having a bone density scan (DEXA).    Shots: Get a flu shot each year. Get a tetanus shot every 10 years.    Nutrition:     Eat at least 5 servings of fruits and vegetables each day.    Eat whole-grain bread, whole-wheat pasta and brown rice instead of white grains and rice.    Get adequate Calcium and Vitamin D.     Lifestyle    Exercise at least 150 minutes a week (30 minutes a day, 5 days a week). This will help you control your weight and prevent disease.    Limit alcohol to one drink per day.    No smoking.     Wear sunscreen to prevent skin cancer.     See your dentist every six months for an exam and cleaning.    See your eye doctor every 1 to 2 years.

## 2021-10-31 NOTE — RESULT ENCOUNTER NOTE
Carina Contreras,    This is to inform you regarding your test result.    TSH which is thyroid hormone is normal.  Stay on current dose of levothyroxine.  Recheck TSH in 6 months  Ferritin which is iron stores in the body is normal.  Your total cholesterol is normal.  HDL which is called good cholesterol is normal.  Your LDL cholesterol is normal.  This is often call bad cholesterol and high levels increase the risk for heart attacks and strokes.  Your triglycerides are normal.  Sodium is slightly low and calcium is slightly elevated   Recheck CMP in one month  Make lab only appointment   Vitamin B 12 level is normal.  White count is slightly low but stable   Recheck CBC in 6 months      Sincerely,      Dr.Nasima Jeet MD,FACP

## 2021-11-01 ENCOUNTER — ANCILLARY PROCEDURE (OUTPATIENT)
Dept: MAMMOGRAPHY | Facility: CLINIC | Age: 61
End: 2021-11-01
Attending: INTERNAL MEDICINE
Payer: COMMERCIAL

## 2021-11-01 DIAGNOSIS — Z12.31 VISIT FOR SCREENING MAMMOGRAM: ICD-10-CM

## 2021-11-01 PROCEDURE — 77067 SCR MAMMO BI INCL CAD: CPT | Mod: TC | Performed by: RADIOLOGY

## 2021-11-01 PROCEDURE — 77063 BREAST TOMOSYNTHESIS BI: CPT | Mod: TC | Performed by: RADIOLOGY

## 2021-11-02 LAB
BKR LAB AP GYN ADEQUACY: NORMAL
BKR LAB AP GYN INTERPRETATION: NORMAL
BKR LAB AP HPV REFLEX: NORMAL
BKR LAB AP PREVIOUS ABNORMAL: NORMAL
PATH REPORT.COMMENTS IMP SPEC: NORMAL
PATH REPORT.RELEVANT HX SPEC: NORMAL

## 2021-11-03 LAB
HUMAN PAPILLOMA VIRUS 16 DNA: NEGATIVE
HUMAN PAPILLOMA VIRUS 18 DNA: NEGATIVE
HUMAN PAPILLOMA VIRUS FINAL DIAGNOSIS: NORMAL
HUMAN PAPILLOMA VIRUS OTHER HR: NEGATIVE

## 2021-11-15 ENCOUNTER — HOSPITAL ENCOUNTER (OUTPATIENT)
Dept: BONE DENSITY | Facility: CLINIC | Age: 61
Discharge: HOME | End: 2021-11-15
Attending: INTERNAL MEDICINE | Admitting: INTERNAL MEDICINE
Payer: COMMERCIAL

## 2021-11-15 DIAGNOSIS — Z78.0 ASYMPTOMATIC POSTMENOPAUSAL STATUS: ICD-10-CM

## 2021-11-15 PROCEDURE — 77080 DXA BONE DENSITY AXIAL: CPT

## 2021-11-16 ENCOUNTER — E-VISIT (OUTPATIENT)
Dept: FAMILY MEDICINE | Facility: CLINIC | Age: 61
End: 2021-11-16
Payer: COMMERCIAL

## 2021-11-16 DIAGNOSIS — M85.80 OSTEOPENIA WITH HIGH RISK OF FRACTURE: Primary | ICD-10-CM

## 2021-11-16 PROCEDURE — 99207 PR NO BILLABLE SERVICE THIS VISIT: CPT | Performed by: INTERNAL MEDICINE

## 2021-11-16 NOTE — RESULT ENCOUNTER NOTE
Carina Contreras,    This is to inform you regarding your test result.      Bone density shows osteopenia with high fracture risk.  Make sure that you take adequate calcium and vit D and do weight bearing exercises .  But due to high fracture risk you need medication like bisphosphonate  Please make appointment at your earliest convenience to discuss this further.  Virtual visit is ok    For both men and women, the most widely prescribed osteoporosis medications are bisphosphonates. Examples include:   Alendronate (Fosamax, Binosto)   Risedronate (Actonel, Atelvia)   Ibandronate (Boniva)   Zoledronic acid (Reclast, Zometa)   Side effects include nausea, abdominal pain, difficulty swallowing, and the risk of an inflamed esophagus or esophageal ulcers. These are less likely to occur if the medicine is taken properly. Injected forms of bisphosphonates don't cause stomach upset. And it may be easier to schedule a quarterly or yearly injection than to remember to take a weekly or monthly pill, but it can be more costly to do so.   Long-term bisphosphonate therapy has been linked to a rare problem in which the middle of the thighbone cracks and might even break completely. Bisphosphonates also have the potential to affect the jawbone. Osteonecrosis of the jaw is a rare condition mostly occurring after a tooth extraction in which a section of jawbone dies and deteriorates. You should have a recent dental examination before starting bisphosphonates.   Hormone-related therapy  Estrogen, especially when started soon after menopause, can help maintain bone density. However, estrogen therapy can increase a woman's risk of blood clots, endometrial cancer, breast cancer and possibly heart disease.   Raloxifene (Evista) mimics estrogen's beneficial effects on bone density in postmenopausal women, without some of the risks associated with estrogen. Taking this drug may also reduce the risk of some types of breast cancer. Hot flashes are  a common side effect. Raloxifene also may increase your risk of blood clots.   In men, osteoporosis may be linked with a gradual age-related decline in testosterone levels. Testosterone replacement therapy can help increase bone density, but osteoporosis medications are better studied in men with osteoporosis and are recommended instead of or in addition to testosterone.   Less common osteoporosis medications  If you can't tolerate the more common treatments for osteoporosis - or if they don't work well enough - your doctor might suggest trying:   Teriparatide (Forteo). This powerful drug is similar to parathyroid hormone and stimulates new bone growth. It's given by injection under the skin. After two years of treatment with teriparatide, another osteoporosis drug is taken to maintain the new bone growth.   Denosumab (Prolia). Compared with bisphosphonates, denosumab produces similar or better bone density results while targeting a different step in the bone remodeling process. Denosumab is delivered via a shot under the skin every six months. The most common side effects are back and muscle pain.       Sincerely,      Dr.Nasima Jeet MD,FACP

## 2021-11-29 ENCOUNTER — LAB (OUTPATIENT)
Dept: LAB | Facility: CLINIC | Age: 61
End: 2021-11-29
Payer: COMMERCIAL

## 2021-11-29 DIAGNOSIS — Z79.899 MEDICATION MANAGEMENT: ICD-10-CM

## 2021-11-29 PROCEDURE — 36415 COLL VENOUS BLD VENIPUNCTURE: CPT

## 2021-11-29 PROCEDURE — 80053 COMPREHEN METABOLIC PANEL: CPT

## 2021-11-30 LAB
ALBUMIN SERPL-MCNC: 4 G/DL (ref 3.4–5)
ALP SERPL-CCNC: 62 U/L (ref 40–150)
ALT SERPL W P-5'-P-CCNC: 32 U/L (ref 0–50)
ANION GAP SERPL CALCULATED.3IONS-SCNC: 4 MMOL/L (ref 3–14)
AST SERPL W P-5'-P-CCNC: 23 U/L (ref 0–45)
BILIRUB SERPL-MCNC: 0.6 MG/DL (ref 0.2–1.3)
BUN SERPL-MCNC: 16 MG/DL (ref 7–30)
CALCIUM SERPL-MCNC: 9.7 MG/DL (ref 8.5–10.1)
CHLORIDE BLD-SCNC: 102 MMOL/L (ref 94–109)
CO2 SERPL-SCNC: 30 MMOL/L (ref 20–32)
CREAT SERPL-MCNC: 0.76 MG/DL (ref 0.52–1.04)
GFR SERPL CREATININE-BSD FRML MDRD: 84 ML/MIN/1.73M2
GLUCOSE BLD-MCNC: 118 MG/DL (ref 70–99)
POTASSIUM BLD-SCNC: 4.2 MMOL/L (ref 3.4–5.3)
PROT SERPL-MCNC: 7.4 G/DL (ref 6.8–8.8)
SODIUM SERPL-SCNC: 136 MMOL/L (ref 133–144)

## 2021-12-17 ENCOUNTER — VIRTUAL VISIT (OUTPATIENT)
Dept: FAMILY MEDICINE | Facility: CLINIC | Age: 61
End: 2021-12-17
Payer: COMMERCIAL

## 2021-12-17 DIAGNOSIS — M85.80 OSTEOPENIA WITH HIGH RISK OF FRACTURE: Primary | ICD-10-CM

## 2021-12-17 PROCEDURE — 99213 OFFICE O/P EST LOW 20 MIN: CPT | Mod: 95 | Performed by: INTERNAL MEDICINE

## 2021-12-17 RX ORDER — ALENDRONATE SODIUM 70 MG/1
70 TABLET ORAL
Qty: 12 TABLET | Refills: 3 | Status: SHIPPED | OUTPATIENT
Start: 2021-12-17 | End: 2022-11-02

## 2021-12-17 NOTE — PROGRESS NOTES
Diane is a 61 year old who is being evaluated via a billable video visit.      How would you like to obtain your AVS? MyChart  If the video visit is dropped, the invitation should be resent by: Text to cell phone: [phone]  Will anyone else be joining your video visit? No      Video Start Time: 2:56 PM    Assessment & Plan     Diane was seen today for follow up.    Diagnoses and all orders for this visit:    Osteopenia with high risk of fracture    This appointment was to discuss bone density result  Bone density showed osteopenia with high fracture risk  Discussed the management  Discussed the importance of taking adequate calcium with vitamin D and weightbearing exercises  We discussed various options of treatment  We will start with Fosamax which is alendronate    Take this medicine by mouth first thing in the morning, after you are up for the day. Do not eat or drink anything before you take your medicine. Swallow the tablet with a full glass (6 to 8 fluid ounces) of plain water. Do not take this medicine with any other drink. Do not chew or crush the tablet. After taking this medicine, do not eat breakfast, drink, or take any medicines or vitamins for at least 30 minutes. Sit or stand up for at least 30 minutes after you take this medicine; do not lie down. Do not take your medicine more often than directed.    We can consider Boniva if needed in case if she does not tolerate this  IV options are also available called Fosamax and Prolia  She does not have any dental work is scheduled in near future             See Patient Instructions    No follow-ups on file.    Angelica Marcano MD  [facility]    Lis Contreras is a 61 year old who presents for the following health issues     HPI     Follow Up Dexa Scan Results          Review of Systems   Constitutional, HEENT, cardiovascular, pulmonary, gi and gu systems are negative, except as otherwise noted.      Objective    Vitals - Patient  Reported  Weight (Patient Reported): 68.9 kg (152 lb)  Pain Score: No Pain (0)      Vitals:  No vitals were obtained today due to virtual visit.    Physical Exam   GENERAL: Healthy, alert and no distress  EYES: Eyes grossly normal to inspection.  No discharge or erythema, or obvious scleral/conjunctival abnormalities.  RESP: No audible wheeze, cough, or visible cyanosis.  No visible retractions or increased work of breathing.    SKIN: Visible skin clear. No significant rash, abnormal pigmentation or lesions.  NEURO: Cranial nerves grossly intact.  Mentation and speech appropriate for age.  PSYCH: Mentation appears normal, affect normal/bright, judgement and insight intact, normal speech and appearance well-groomed.        Disclaimer: This note consists of symbols derived from keyboarding, dictation and/or voice recognition software. As a result, there may be errors in the script that have gone undetected. Please consider this when interpreting information found in this chart.          Video-Visit Details    Type of service:  Video Visit    Video End Time:3:07 PM    Originating Location (pt. Location): Home    Distant Location (provider location):  [facility]     Platform used for Video Visit: [employer]

## 2021-12-17 NOTE — PATIENT INSTRUCTIONS
Advise to take adequate calcium and vit D and weight bearing exercises  Take Fosamax 70 mg once a week  Take this medicine by mouth first thing in the morning, after you are up for the day. Do not eat or drink anything before you take your medicine. Swallow the tablet with a full glass (6 to 8 fluid ounces) of plain water. Do not take this medicine with any other drink. Do not chew or crush the tablet. After taking this medicine, do not eat breakfast, drink, or take any medicines or vitamins for at least 30 minutes. Sit or stand up for at least 30 minutes after you take this medicine; do not lie down. Do not take your medicine more often than directed.    Follow-up in 6 months  Seek sooner medical attention if there is any worsening of symptoms or problems.    Patient Education     Patient Education    Alendronate Sodium Effervescent tablet    Alendronate Sodium Oral solution, weekly    Alendronate Sodium Oral tablet    Alendronate Sodium Oral tablet, weekly  Alendronate Sodium Oral tablet  What is this medicine?  ALENDRONATE (a ITZEL droe armen) slows calcium loss from bones. It helps to make normal healthy bone and to slow bone loss in people with Paget's disease and osteoporosis. It may be used in others at risk for bone loss.  This medicine may be used for other purposes; ask your health care provider or pharmacist if you have questions.  What should I tell my health care provider before I take this medicine?  They need to know if you have any of these conditions:    dental disease    esophagus, stomach, or intestine problems, like acid reflux or GERD    kidney disease    low blood calcium    low vitamin D    problems sitting or standing 30 minutes    trouble swallowing    an unusual or allergic reaction to alendronate, other medicines, foods, dyes, or preservatives    pregnant or trying to get pregnant    breast-feeding  How should I use this medicine?  You must take this medicine exactly as directed or you will  lower the amount of the medicine you absorb into your body or you may cause yourself harm. Take this medicine by mouth first thing in the morning, after you are up for the day. Do not eat or drink anything before you take your medicine. Swallow the tablet with a full glass (6 to 8 fluid ounces) of plain water. Do not take this medicine with any other drink. Do not chew or crush the tablet. After taking this medicine, do not eat breakfast, drink, or take any medicines or vitamins for at least 30 minutes. Sit or stand up for at least 30 minutes after you take this medicine; do not lie down. Do not take your medicine more often than directed.  Talk to your pediatrician regarding the use of this medicine in children. Special care may be needed.  Overdosage: If you think you have taken too much of this medicine contact a poison control center or emergency room at once.  NOTE: This medicine is only for you. Do not share this medicine with others.  What if I miss a dose?  If you miss a dose, do not take it later in the day. Continue your normal schedule starting the next morning. Do not take double or extra doses.  What may interact with this medicine?    aluminum hydroxide    antacids    aspirin    calcium supplements    drugs for inflammation like ibuprofen, naproxen, and others    iron supplements    magnesium supplements    vitamins with minerals  This list may not describe all possible interactions. Give your health care provider a list of all the medicines, herbs, non-prescription drugs, or dietary supplements you use. Also tell them if you smoke, drink alcohol, or use illegal drugs. Some items may interact with your medicine.  What should I watch for while using this medicine?  Visit your doctor or health care professional for regular checks ups. It may be some time before you see benefit from this medicine. Do not stop taking your medicine except on your doctor's advice. Your doctor or health care professional may  order blood tests and other tests to see how you are doing.  You should make sure you get enough calcium and vitamin D while you are taking this medicine, unless your doctor tells you not to. Discuss the foods you eat and the vitamins you take with your health care professional.  Some people who take this medicine have severe bone, joint, and/or muscle pain. This medicine may also increase your risk for a broken thigh bone. Tell your doctor right away if you have pain in your upper leg or groin. Tell your doctor if you have any pain that does not go away or that gets worse.  This medicine can make you more sensitive to the sun. If you get a rash while taking this medicine, sunlight may cause the rash to get worse. Keep out of the sun. If you cannot avoid being in the sun, wear protective clothing and use sunscreen. Do not use sun lamps or tanning beds/booths.  What side effects may I notice from receiving this medicine?  Side effects that you should report to your doctor or health care professional as soon as possible:    allergic reactions like skin rash, itching or hives, swelling of the face, lips, or tongue    black or tarry stools    bone, muscle or joint pain    changes in vision    chest pain    heartburn or stomach pain    jaw pain, especially after dental work    pain or trouble when swallowing    redness, blistering, peeling or loosening of the skin, including inside the mouth  Side effects that usually do not require medical attention (report to your doctor or health care professional if they continue or are bothersome):    changes in taste    diarrhea or constipation    eye pain or itching    headache    nausea or vomiting    stomach gas or fullness  This list may not describe all possible side effects. Call your doctor for medical advice about side effects. You may report side effects to FDA at 2-800-FDA-9569.  Where should I keep my medicine?  Keep out of the reach of children.  Store at room  temperature of 15 and 30 degrees C (59 and 86 degrees F). Throw away any unused medicine after the expiration date.  NOTE:This sheet is a summary. It may not cover all possible information. If you have questions about this medicine, talk to your doctor, pharmacist, or health care provider. Copyright  2016 [employer]

## 2022-04-29 ENCOUNTER — LAB (OUTPATIENT)
Dept: LAB | Facility: CLINIC | Age: 62
End: 2022-04-29
Payer: COMMERCIAL

## 2022-04-29 DIAGNOSIS — D72.819 LEUKOPENIA, UNSPECIFIED TYPE: ICD-10-CM

## 2022-04-29 DIAGNOSIS — D72.819 LEUKOPENIA, UNSPECIFIED TYPE: Primary | ICD-10-CM

## 2022-04-29 LAB
ERYTHROCYTE [DISTWIDTH] IN BLOOD BY AUTOMATED COUNT: 12.6 % (ref 10–15)
HCT VFR BLD AUTO: 37.7 % (ref 35–47)
HGB BLD-MCNC: 12.4 G/DL (ref 11.7–15.7)
MCH RBC QN AUTO: 31.5 PG (ref 26.5–33)
MCHC RBC AUTO-ENTMCNC: 32.9 G/DL (ref 31.5–36.5)
MCV RBC AUTO: 96 FL (ref 78–100)
PLATELET # BLD AUTO: 154 10E3/UL (ref 150–450)
RBC # BLD AUTO: 3.94 10E6/UL (ref 3.8–5.2)
WBC # BLD AUTO: 2.6 10E3/UL (ref 4–11)

## 2022-04-29 PROCEDURE — 36415 COLL VENOUS BLD VENIPUNCTURE: CPT

## 2022-04-29 PROCEDURE — 85027 COMPLETE CBC AUTOMATED: CPT

## 2022-04-29 NOTE — RESULT ENCOUNTER NOTE
Carina Contreras,    This is to inform you regarding your test result.    Your white cell count continue to stay on the low side  But numbers are stable  Recheck CBC in 6 months  You will be due for your annual wellness visit after October 29 of this year  please schedule that appointment and we can do all the lab work at that time      Sincerely,      Dr.Nasima Jeet MD,FACP

## 2022-05-03 ENCOUNTER — LAB (OUTPATIENT)
Dept: LAB | Facility: CLINIC | Age: 62
End: 2022-05-03
Payer: COMMERCIAL

## 2022-05-03 DIAGNOSIS — E03.9 HYPOTHYROIDISM, UNSPECIFIED TYPE: ICD-10-CM

## 2022-05-03 LAB — TSH SERPL DL<=0.005 MIU/L-ACNC: 1.66 MU/L (ref 0.4–4)

## 2022-05-03 PROCEDURE — 84443 ASSAY THYROID STIM HORMONE: CPT

## 2022-05-03 PROCEDURE — 36415 COLL VENOUS BLD VENIPUNCTURE: CPT

## 2022-10-29 ENCOUNTER — HEALTH MAINTENANCE LETTER (OUTPATIENT)
Age: 62
End: 2022-10-29

## 2022-10-30 DIAGNOSIS — M85.80 OSTEOPENIA WITH HIGH RISK OF FRACTURE: ICD-10-CM

## 2022-11-02 ENCOUNTER — ANCILLARY PROCEDURE (OUTPATIENT)
Dept: MAMMOGRAPHY | Facility: CLINIC | Age: 62
End: 2022-11-02
Attending: INTERNAL MEDICINE
Payer: COMMERCIAL

## 2022-11-02 DIAGNOSIS — Z12.31 VISIT FOR SCREENING MAMMOGRAM: ICD-10-CM

## 2022-11-02 PROCEDURE — 77067 SCR MAMMO BI INCL CAD: CPT | Mod: TC | Performed by: RADIOLOGY

## 2022-11-02 PROCEDURE — 77063 BREAST TOMOSYNTHESIS BI: CPT | Mod: TC | Performed by: RADIOLOGY

## 2022-11-02 RX ORDER — ALENDRONATE SODIUM 70 MG/1
TABLET ORAL
Qty: 12 TABLET | Refills: 0 | Status: SHIPPED | OUTPATIENT
Start: 2022-11-02 | End: 2022-11-17

## 2022-11-02 NOTE — TELEPHONE ENCOUNTER
Prescription approved per [facility] Refill Protocol.  Mehnaz Paredes, RN  [facility] RN Triage Team

## 2022-11-03 DIAGNOSIS — E03.9 HYPOTHYROIDISM, UNSPECIFIED TYPE: ICD-10-CM

## 2022-11-04 ENCOUNTER — LAB (OUTPATIENT)
Dept: LAB | Facility: CLINIC | Age: 62
End: 2022-11-04
Payer: COMMERCIAL

## 2022-11-04 DIAGNOSIS — E78.5 HYPERLIPIDEMIA, UNSPECIFIED HYPERLIPIDEMIA TYPE: ICD-10-CM

## 2022-11-04 DIAGNOSIS — Z79.899 MEDICATION MANAGEMENT: ICD-10-CM

## 2022-11-04 DIAGNOSIS — D72.819 LEUKOPENIA, UNSPECIFIED TYPE: ICD-10-CM

## 2022-11-04 DIAGNOSIS — E03.9 HYPOTHYROIDISM, UNSPECIFIED TYPE: ICD-10-CM

## 2022-11-04 LAB
BASOPHILS # BLD AUTO: 0 10E3/UL (ref 0–0.2)
BASOPHILS NFR BLD AUTO: 0 %
EOSINOPHIL # BLD AUTO: 0 10E3/UL (ref 0–0.7)
EOSINOPHIL NFR BLD AUTO: 1 %
ERYTHROCYTE [DISTWIDTH] IN BLOOD BY AUTOMATED COUNT: 13 % (ref 10–15)
HCT VFR BLD AUTO: 35.4 % (ref 35–47)
HGB BLD-MCNC: 11.7 G/DL (ref 11.7–15.7)
IMM GRANULOCYTES # BLD: 0 10E3/UL
IMM GRANULOCYTES NFR BLD: 0 %
LYMPHOCYTES # BLD AUTO: 0.9 10E3/UL (ref 0.8–5.3)
LYMPHOCYTES NFR BLD AUTO: 26 %
MCH RBC QN AUTO: 31.3 PG (ref 26.5–33)
MCHC RBC AUTO-ENTMCNC: 33.1 G/DL (ref 31.5–36.5)
MCV RBC AUTO: 95 FL (ref 78–100)
MONOCYTES # BLD AUTO: 0.3 10E3/UL (ref 0–1.3)
MONOCYTES NFR BLD AUTO: 8 %
NEUTROPHILS # BLD AUTO: 2.2 10E3/UL (ref 1.6–8.3)
NEUTROPHILS NFR BLD AUTO: 64 %
PLATELET # BLD AUTO: 158 10E3/UL (ref 150–450)
RBC # BLD AUTO: 3.74 10E6/UL (ref 3.8–5.2)
WBC # BLD AUTO: 3.4 10E3/UL (ref 4–11)

## 2022-11-04 PROCEDURE — 80048 BASIC METABOLIC PNL TOTAL CA: CPT

## 2022-11-04 PROCEDURE — 36415 COLL VENOUS BLD VENIPUNCTURE: CPT

## 2022-11-04 PROCEDURE — 80061 LIPID PANEL: CPT

## 2022-11-04 PROCEDURE — 84443 ASSAY THYROID STIM HORMONE: CPT

## 2022-11-04 PROCEDURE — 85025 COMPLETE CBC W/AUTO DIFF WBC: CPT

## 2022-11-04 RX ORDER — LEVOTHYROXINE SODIUM 50 UG/1
TABLET ORAL
Qty: 90 TABLET | Refills: 0 | Status: SHIPPED | OUTPATIENT
Start: 2022-11-04 | End: 2022-11-17

## 2022-11-04 NOTE — TELEPHONE ENCOUNTER
Prescription approved per [facility] Refill Protocol.    Harshad Germain RN  [facility]

## 2022-11-05 LAB
ANION GAP SERPL CALCULATED.3IONS-SCNC: 4 MMOL/L (ref 3–14)
BUN SERPL-MCNC: 23 MG/DL (ref 7–30)
CALCIUM SERPL-MCNC: 9.5 MG/DL (ref 8.5–10.1)
CHLORIDE BLD-SCNC: 105 MMOL/L (ref 94–109)
CHOLEST SERPL-MCNC: 161 MG/DL
CO2 SERPL-SCNC: 27 MMOL/L (ref 20–32)
CREAT SERPL-MCNC: 0.63 MG/DL (ref 0.52–1.04)
FASTING STATUS PATIENT QL REPORTED: NO
GFR SERPL CREATININE-BSD FRML MDRD: >90 ML/MIN/1.73M2
GLUCOSE BLD-MCNC: 106 MG/DL (ref 70–99)
HDLC SERPL-MCNC: 79 MG/DL
LDLC SERPL CALC-MCNC: 72 MG/DL
NONHDLC SERPL-MCNC: 82 MG/DL
POTASSIUM BLD-SCNC: 4.1 MMOL/L (ref 3.4–5.3)
SODIUM SERPL-SCNC: 136 MMOL/L (ref 133–144)
TRIGL SERPL-MCNC: 48 MG/DL
TSH SERPL DL<=0.005 MIU/L-ACNC: 1.94 MU/L (ref 0.4–4)

## 2022-11-06 NOTE — RESULT ENCOUNTER NOTE
Carina Contreras,    This is to inform you regarding your test result.    TSH which is thyroid hormone is normal.  Your total cholesterol is normal.  HDL which is called good cholesterol is normal.  Your LDL cholesterol is normal.  This is often call bad cholesterol and high levels increase the risk for heart attacks and strokes.  Your triglycerides are normal.  Basic metabolic panel which includes electrolytes and kidney fucntion is normal  Glucose which is your blood sugar is slightly elevated.  Avoid high sugar containing food.  White count is low but numbers are stable .  Recheck in 6 months.      Sincerely,      Dr.Nasima Jeet MD,FACP

## 2022-11-17 ENCOUNTER — OFFICE VISIT (OUTPATIENT)
Dept: FAMILY MEDICINE | Facility: CLINIC | Age: 62
End: 2022-11-17
Payer: COMMERCIAL

## 2022-11-17 VITALS
TEMPERATURE: 98.1 F | OXYGEN SATURATION: 100 % | WEIGHT: 154.7 LBS | HEIGHT: 66 IN | BODY MASS INDEX: 24.86 KG/M2 | DIASTOLIC BLOOD PRESSURE: 75 MMHG | HEART RATE: 68 BPM | SYSTOLIC BLOOD PRESSURE: 131 MMHG | RESPIRATION RATE: 14 BRPM

## 2022-11-17 DIAGNOSIS — D69.6 THROMBOCYTOPENIA (H): ICD-10-CM

## 2022-11-17 DIAGNOSIS — Q23.81 BICUSPID AORTIC VALVE: ICD-10-CM

## 2022-11-17 DIAGNOSIS — E03.9 HYPOTHYROIDISM, UNSPECIFIED TYPE: ICD-10-CM

## 2022-11-17 DIAGNOSIS — M85.80 OSTEOPENIA WITH HIGH RISK OF FRACTURE: ICD-10-CM

## 2022-11-17 DIAGNOSIS — I77.810 AORTIC ROOT DILATATION (H): ICD-10-CM

## 2022-11-17 DIAGNOSIS — Z23 NEED FOR VACCINATION: ICD-10-CM

## 2022-11-17 DIAGNOSIS — Z00.00 ROUTINE GENERAL MEDICAL EXAMINATION AT A HEALTH CARE FACILITY: Primary | ICD-10-CM

## 2022-11-17 DIAGNOSIS — K43.9 ABDOMINAL WALL HERNIA: ICD-10-CM

## 2022-11-17 PROCEDURE — 90471 IMMUNIZATION ADMIN: CPT | Performed by: INTERNAL MEDICINE

## 2022-11-17 PROCEDURE — 99214 OFFICE O/P EST MOD 30 MIN: CPT | Mod: 25 | Performed by: INTERNAL MEDICINE

## 2022-11-17 PROCEDURE — 99396 PREV VISIT EST AGE 40-64: CPT | Mod: 25 | Performed by: INTERNAL MEDICINE

## 2022-11-17 PROCEDURE — 90677 PCV20 VACCINE IM: CPT | Performed by: INTERNAL MEDICINE

## 2022-11-17 RX ORDER — ALENDRONATE SODIUM 70 MG/1
70 TABLET ORAL
Qty: 12 TABLET | Refills: 3 | Status: SHIPPED | OUTPATIENT
Start: 2022-11-17 | End: 2023-11-20

## 2022-11-17 RX ORDER — LEVOTHYROXINE SODIUM 50 UG/1
50 TABLET ORAL DAILY
Qty: 90 TABLET | Refills: 3 | Status: SHIPPED | OUTPATIENT
Start: 2022-11-17 | End: 2023-11-20

## 2022-11-17 ASSESSMENT — PAIN SCALES - GENERAL: PAINLEVEL: NO PAIN (0)

## 2022-11-17 NOTE — PROGRESS NOTES
SUBJECTIVE:   CC: Diane is an 62 year old who presents for preventive health visit.     Patient has been advised of split billing requirements and indicates understanding: Yes  Healthy Habits:     Getting at least 3 servings of Calcium per day:  Yes    Bi-annual eye exam:  Yes    Dental care twice a year:  Yes    Sleep apnea or symptoms of sleep apnea:  Sleep apnea    Diet:  Regular (no restrictions)    Frequency of exercise:  4-5 days/week    Duration of exercise:  30-45 minutes    Taking medications regularly:  0    Barriers to taking medications:  None    Medication side effects:  None    PHQ-2 Total Score: 0    Additional concerns today:  No  History of Present Illness       Reason for visit:  Annual wellness check up    She eats 4 or more servings of fruits and vegetables daily.She consumes 0 sweetened beverage(s) daily.She exercises with enough effort to increase her heart rate 30 to 60 minutes per day.  She exercises with enough effort to increase her heart rate 5 days per week.   She is taking medications regularly.  She is not taking prescribed medications regularly due to None.              Today's PHQ-2 Score:   PHQ-2 ( 1999 Pfizer) 11/16/2022   Q1: Little interest or pleasure in doing things 0   Q2: Feeling down, depressed or hopeless 0   PHQ-2 Score 0   PHQ-2 Total Score (12-17 Years)- Positive if 3 or more points; Administer PHQ-A if positive -   Q1: Little interest or pleasure in doing things Not at all   Q2: Feeling down, depressed or hopeless Not at all   PHQ-2 Score 0           Social History     Tobacco Use     Smoking status: Never     Passive exposure: Never     Smokeless tobacco: Never   Substance Use Topics     Alcohol use: No         Alcohol Use 10/26/2021   Prescreen: >3 drinks/day or >7 drinks/week? Not Applicable   Prescreen: >3 drinks/day or >7 drinks/week? -       Reviewed orders with patient.  Reviewed health maintenance and updated orders accordingly - Yes  Labs reviewed in  "EPIC    Breast Cancer Screening:    FHS-7:   Breast CA Risk Assessment (FHS-7) 11/1/2021 11/2/2022   Did any of your first-degree relatives have breast or ovarian cancer? No No   Did any of your relatives have bilateral breast cancer? No No   Did any man in your family have breast cancer? No No   Did any woman in your family have breast and ovarian cancer? No No   Did any woman in your family have breast cancer before age 50 y? No No   Do you have 2 or more relatives with breast and/or ovarian cancer? No No   Do you have 2 or more relatives with breast and/or bowel cancer? No No         Pertinent mammograms are reviewed under the imaging tab.    History of abnormal Pap smear: NO - age 30-65 PAP every 5 years with negative HPV co-testing recommended  PAP / HPV Latest Ref Rng & Units 10/29/2021 9/21/2018 11/13/2009   PAP   Negative for Intraepithelial Lesion or Malignancy (NILM) - -   PAP (Historical) - - NIL NIL   HPV16 Negative Negative Negative -   HPV18 Negative Negative Negative -   HRHPV Negative Negative Negative -     Reviewed and updated as needed this visit by clinical staff   Tobacco  Allergies  Meds              Reviewed and updated as needed this visit by Provider                       OBJECTIVE:   /75 (BP Location: Right arm, Patient Position: Sitting, Cuff Size: Adult Regular)   Pulse 68   Temp 98.1  F (36.7  C) (Oral)   Resp 14   Ht 1.664 m (5' 5.51\")   Wt 70.2 kg (154 lb 11.2 oz)   SpO2 100%   BMI 25.34 kg/m    Physical Exam  GENERAL: healthy, alert and no distress  EYES: Eyes grossly normal to inspection, PERRL and conjunctivae and sclerae normal  HENT: ear canals and TM's normal, nose and mouth without ulcers or lesions  NECK: no adenopathy, no asymmetry, masses, or scars and thyroid normal to palpation  RESP: lungs clear to auscultation - no rales, rhonchi or wheezes  BREAST: normal without masses, tenderness or nipple discharge and no palpable axillary masses or adenopathy  CV: " regular rate and rhythm, normal S1 S2, no S3 or S4, no murmur, click or rub, no peripheral edema and peripheral pulses strong  ABDOMEN: soft, nontender, no hepatosplenomegaly, no masses and bowel sounds normal  MS: no gross musculoskeletal defects noted, no edema  SKIN: no suspicious lesions or rashes  NEURO: Normal strength and tone, mentation intact and speech normal  PSYCH: mentation appears normal, affect normal/bright    Lab on 11/04/2022   Component Date Value Ref Range Status     TSH 11/04/2022 1.94  0.40 - 4.00 mU/L Final     Cholesterol 11/04/2022 161  <200 mg/dL Final     Triglycerides 11/04/2022 48  <150 mg/dL Final     Direct Measure HDL 11/04/2022 79  >=50 mg/dL Final     LDL Cholesterol Calculated 11/04/2022 72  <=100 mg/dL Final     Non HDL Cholesterol 11/04/2022 82  <130 mg/dL Final     Patient Fasting > 8hrs? 11/04/2022 No   Final     Sodium 11/04/2022 136  133 - 144 mmol/L Final     Potassium 11/04/2022 4.1  3.4 - 5.3 mmol/L Final     Chloride 11/04/2022 105  94 - 109 mmol/L Final     Carbon Dioxide (CO2) 11/04/2022 27  20 - 32 mmol/L Final     Anion Gap 11/04/2022 4  3 - 14 mmol/L Final     Urea Nitrogen 11/04/2022 23  7 - 30 mg/dL Final     Creatinine 11/04/2022 0.63  0.52 - 1.04 mg/dL Final     Calcium 11/04/2022 9.5  8.5 - 10.1 mg/dL Final     Glucose 11/04/2022 106 (H)  70 - 99 mg/dL Final     GFR Estimate 11/04/2022 >90  >60 mL/min/1.73m2 Final    Effective December 21, 2021 eGFRcr in adults is calculated using the 2021 CKD-EPI creatinine equation which includes age and gender (Gina et al., NEJM, DOI: 10.1056/RVPWgc2962601)     WBC Count 11/04/2022 3.4 (L)  4.0 - 11.0 10e3/uL Final     RBC Count 11/04/2022 3.74 (L)  3.80 - 5.20 10e6/uL Final     Hemoglobin 11/04/2022 11.7  11.7 - 15.7 g/dL Final     Hematocrit 11/04/2022 35.4  35.0 - 47.0 % Final     MCV 11/04/2022 95  78 - 100 fL Final     MCH 11/04/2022 31.3  26.5 - 33.0 pg Final     MCHC 11/04/2022 33.1  31.5 - 36.5 g/dL Final     RDW  11/04/2022 13.0  10.0 - 15.0 % Final     Platelet Count 11/04/2022 158  150 - 450 10e3/uL Final     % Neutrophils 11/04/2022 64  % Final     % Lymphocytes 11/04/2022 26  % Final     % Monocytes 11/04/2022 8  % Final     % Eosinophils 11/04/2022 1  % Final     % Basophils 11/04/2022 0  % Final     % Immature Granulocytes 11/04/2022 0  % Final     Absolute Neutrophils 11/04/2022 2.2  1.6 - 8.3 10e3/uL Final     Absolute Lymphocytes 11/04/2022 0.9  0.8 - 5.3 10e3/uL Final     Absolute Monocytes 11/04/2022 0.3  0.0 - 1.3 10e3/uL Final     Absolute Eosinophils 11/04/2022 0.0  0.0 - 0.7 10e3/uL Final     Absolute Basophils 11/04/2022 0.0  0.0 - 0.2 10e3/uL Final     Absolute Immature Granulocytes 11/04/2022 0.0  <=0.4 10e3/uL Final         ASSESSMENT/PLAN:   Diane was seen today for physical.    Diagnoses and all orders for this visit:    Routine general medical examination at a health care facility  Preventive health counseling was also done.  Patient had colonoscopy on February 12, 2020  Mammogram on November 2, 2022    Thrombocytopenia (H)  -     CBC with platelets; Future  Patient has history of thrombocytopenia and leukopenia  Platelets are normal  White count is low but is stable  Recheck it every 6 months  Ordered the labs to be done in 6 months    Aortic root dilatation (H)  Followed by cardiology  Last echo was done on July 13, 2021  It shows bicuspid aortic valve mild to moderate aortic regurgitation ascending aorta at 3.6 cm  And aortic root 3.4 cm    Hypothyroidism, unspecified type  -     TSH with free T4 reflex; Future  -     levothyroxine (SYNTHROID/LEVOTHROID) 50 MCG tablet; Take 1 tablet (50 mcg) by mouth daily    Osteopenia with high risk of fracture  -     alendronate (FOSAMAX) 70 MG tablet; Take 1 tablet (70 mg) by mouth every 7 days  Discussed the importance of taking adequate calcium and vitamin D  She is tolerating alendronate really well  Her last bone density was on November 15, 2021  Will repeat  in November of next year    Bicuspid aortic valve  Seen on echo and she is followed by cardiology    Abdominal wall hernia  Comments:  LUQ   Patient has noticed occasional bulging of hernia in left upper quadrant  It was not visible on exam today I discussed about ordering ultrasound to further look into that patient wants to wait until she gets Medicare  At this point she is not having any major issue   it happened only few times when she noticed that bulge and left upper quadrant  It was reducible  She will seek attention if things get worse   otherwise she is planning to wait until she gets Medicare        other orders  -     REVIEW OF HEALTH MAINTENANCE PROTOCOL ORDERS    Disclaimer: This note consists of symbols derived from keyboarding, dictation and/or voice recognition software. As a result, there may be errors in the script that have gone undetected. Please consider this when interpreting information found in this chart.      Patient has been advised of split billing requirements and indicates understanding: Yes      COUNSELING:  Reviewed preventive health counseling, as reflected in patient instructions       Regular exercise       Healthy diet/nutrition       Osteoporosis prevention/bone health       Colorectal Cancer Screening    Disclaimer: This note consists of symbols derived from keyboarding, dictation and/or voice recognition software. As a result, there may be errors in the script that have gone undetected. Please consider this when interpreting information found in this chart.      She reports that she has never smoked. She has never been exposed to tobacco smoke. She has never used smokeless tobacco.          Angelica Marcano MD  [facility]

## 2022-11-17 NOTE — PATIENT INSTRUCTIONS
Labs to be done in 6 months   PCV 20 shot today  Follow up in one year for physical   Seek sooner medical attention if there is any worsening of symptoms or problems.     Preventive Health Recommendations  Female Ages 50 - 64    Yearly exam: See your health care provider every year in order to  Review health changes.   Discuss preventive care.    Review your medicines if your doctor has prescribed any.    Get a Pap test every three years (unless you have an abnormal result and your provider advises testing more often).  If you get Pap tests with HPV test, you only need to test every 5 years, unless you have an abnormal result.   You do not need a Pap test if your uterus was removed (hysterectomy) and you have not had cancer.  You should be tested each year for STDs (sexually transmitted diseases) if you're at risk.   Have a mammogram every 1 to 2 years.  Have a colonoscopy at age 50, or have a yearly FIT test (stool test). These exams screen for colon cancer.    Have a cholesterol test every 5 years, or more often if advised.  Have a diabetes test (fasting glucose) every three years. If you are at risk for diabetes, you should have this test more often.   If you are at risk for osteoporosis (brittle bone disease), think about having a bone density scan (DEXA).    Shots: Get a flu shot each year. Get a tetanus shot every 10 years.    Nutrition:   Eat at least 5 servings of fruits and vegetables each day.  Eat whole-grain bread, whole-wheat pasta and brown rice instead of white grains and rice.  Get adequate Calcium and Vitamin D.     Lifestyle  Exercise at least 150 minutes a week (30 minutes a day, 5 days a week). This will help you control your weight and prevent disease.  Limit alcohol to one drink per day.  No smoking.   Wear sunscreen to prevent skin cancer.   See your dentist every six months for an exam and cleaning.  See your eye doctor every 1 to 2 years.

## 2022-11-17 NOTE — NURSING NOTE
Prior to immunization administration, verified patients identity using patient s name and date of birth. Please see Immunization Activity for additional information.     Screening Questionnaire for Adult Immunization    Are you sick today?   No   Do you have allergies to medications, food, a vaccine component or latex?   Yes   Have you ever had a serious reaction after receiving a vaccination?   No   Do you have a long-term health problem with heart, lung, kidney, or metabolic disease (e.g., diabetes), asthma, a blood disorder, no spleen, complement component deficiency, a cochlear implant, or a spinal fluid leak?  Are you on long-term aspirin therapy?   No   Do you have cancer, leukemia, HIV/AIDS, or any other immune system problem?   No   Do you have a parent, brother, or sister with an immune system problem?   No   In the past 3 months, have you taken medications that affect  your immune system, such as prednisone, other steroids, or anticancer drugs; drugs for the treatment of rheumatoid arthritis, Crohn s disease, or psoriasis; or have you had radiation treatments?   No   Have you had a seizure, or a brain or other nervous system problem?   No   During the past year, have you received a transfusion of blood or blood    products, or been given immune (gamma) globulin or antiviral drug?   No   For women: Are you pregnant or is there a chance you could become       pregnant during the next month?   No   Have you received any vaccinations in the past 4 weeks?   No     Immunization questionnaire was positive for at least one answer.  Notified .        Per orders of Dr. Marcano, injection of PCV20 given by Romana Weber MA. Patient instructed to remain in clinic for 15 minutes afterwards, and to report any adverse reaction to me immediately.       Screening performed by Romana Weber MA on 11/17/2022 at 12:48 PM.

## 2022-11-29 DIAGNOSIS — E78.5 HYPERLIPIDEMIA, UNSPECIFIED HYPERLIPIDEMIA TYPE: ICD-10-CM

## 2022-12-01 RX ORDER — ROSUVASTATIN CALCIUM 5 MG/1
TABLET, COATED ORAL
Qty: 90 TABLET | Refills: 3 | Status: SHIPPED | OUTPATIENT
Start: 2022-12-01 | End: 2023-11-20

## 2023-03-07 ENCOUNTER — TELEPHONE (OUTPATIENT)
Dept: CARDIOLOGY | Facility: CLINIC | Age: 63
End: 2023-03-07
Payer: COMMERCIAL

## 2023-03-07 NOTE — TELEPHONE ENCOUNTER
Health Call Center    Phone Message    May a detailed message be left on voicemail: yes     Reason for Call: Other: Patient calling to schedule visit with Dr. John - Aortic Valve Disease, with echo prior. Please call patient to coordinate. Ok to leave voicemail     Action Taken: Message routed to:  Other: [facility]    Travel Screening: Not Applicable                                                          Thank you!  [facility]

## 2023-04-12 ENCOUNTER — TELEPHONE (OUTPATIENT)
Dept: FAMILY MEDICINE | Facility: CLINIC | Age: 63
End: 2023-04-12
Payer: COMMERCIAL

## 2023-04-12 DIAGNOSIS — R11.0 NAUSEA: Primary | ICD-10-CM

## 2023-04-12 DIAGNOSIS — R11.0 NAUSEA: ICD-10-CM

## 2023-04-12 RX ORDER — SCOLOPAMINE TRANSDERMAL SYSTEM 1 MG/1
PATCH, EXTENDED RELEASE TRANSDERMAL
Qty: 10 PATCH | Refills: 1 | Status: SHIPPED | OUTPATIENT
Start: 2023-04-12 | End: 2023-11-20

## 2023-04-12 NOTE — TELEPHONE ENCOUNTER
Scopolamine Transdermal Patches not covered by insurance.  Options are: Meclizine, Odansetron, Prochlorazinemalea

## 2023-04-13 RX ORDER — ONDANSETRON 4 MG/1
4 TABLET, ORALLY DISINTEGRATING ORAL EVERY 6 HOURS PRN
Qty: 15 TABLET | Refills: 1 | Status: SHIPPED | OUTPATIENT
Start: 2023-04-13 | End: 2023-11-20

## 2023-04-13 NOTE — TELEPHONE ENCOUNTER
Pt called wanting to know why order for zofran was sent to pharm; reviewed notes. Pt verbalizes understanding and agrees to plan of care.

## 2023-04-13 NOTE — TELEPHONE ENCOUNTER
Called patient regarding PCP message below. Left message to call pharmacy regarding prescription.    Aster Menjivar RN on 4/13/2023 at 10:10 AM

## 2023-04-20 ENCOUNTER — TELEPHONE (OUTPATIENT)
Dept: FAMILY MEDICINE | Facility: CLINIC | Age: 63
End: 2023-04-20
Payer: COMMERCIAL

## 2023-04-20 NOTE — TELEPHONE ENCOUNTER
Patient requesting 2nd vaccination for Moderna Bivalent. Information regarding 2nd Bivalent boosters has been circulating around the news recently. As of writing this, [facility] has not had any information sent out regarding 2nd Bivalent boosters yet. Please advise as patient is leaving country on Sunday.    Glenn Andersen, FRANKIE on 4/20/2023 at 10:45 AM

## 2023-04-20 NOTE — CONFIDENTIAL NOTE
We do not have any official instructions yet  She can check with pharmacy  I will follow our clinic policy  Dr.Nasima Jeet MD

## 2023-04-20 NOTE — TELEPHONE ENCOUNTER
PATIENT SEEKING A CALL BACK FROM DR. YBARRA'S TEAM REGARDING A VACCINATION. PATIENT LEAVING Sunday FOR A MONTH OUT OF THE COUNTRY AROUND MANY PEOPLE. STATES SHE IS IMMUNO COMPROMISED. SEEKING TO RECEIVE A SHOT BEFORE HER DEPARTURE.     PATIENT SEEKING CALL BACK AT PHONE NUMBER [phone]-- PATIENT STATES WE ARE FREE TO LEAVE A DETAILED VOICEMAIL IF NEED BE.     VACCINATION IS THE MODERNA VACCINATION THE LATEST ONE.

## 2023-04-24 DIAGNOSIS — I35.1 NONRHEUMATIC AORTIC VALVE INSUFFICIENCY: ICD-10-CM

## 2023-04-24 RX ORDER — LISINOPRIL 10 MG/1
10 TABLET ORAL DAILY
Qty: 90 TABLET | Refills: 1 | Status: SHIPPED | OUTPATIENT
Start: 2023-04-24 | End: 2023-10-19

## 2023-05-25 ENCOUNTER — LAB (OUTPATIENT)
Dept: LAB | Facility: CLINIC | Age: 63
End: 2023-05-25
Payer: COMMERCIAL

## 2023-05-25 DIAGNOSIS — E03.9 HYPOTHYROIDISM, UNSPECIFIED TYPE: ICD-10-CM

## 2023-05-25 DIAGNOSIS — D69.6 THROMBOCYTOPENIA (H): ICD-10-CM

## 2023-05-25 LAB
ERYTHROCYTE [DISTWIDTH] IN BLOOD BY AUTOMATED COUNT: 12.5 % (ref 10–15)
HCT VFR BLD AUTO: 37.4 % (ref 35–47)
HGB BLD-MCNC: 12 G/DL (ref 11.7–15.7)
MCH RBC QN AUTO: 31.3 PG (ref 26.5–33)
MCHC RBC AUTO-ENTMCNC: 32.1 G/DL (ref 31.5–36.5)
MCV RBC AUTO: 97 FL (ref 78–100)
PLATELET # BLD AUTO: 181 10E3/UL (ref 150–450)
RBC # BLD AUTO: 3.84 10E6/UL (ref 3.8–5.2)
TSH SERPL DL<=0.005 MIU/L-ACNC: 1.85 UIU/ML (ref 0.3–4.2)
WBC # BLD AUTO: 3.4 10E3/UL (ref 4–11)

## 2023-05-25 PROCEDURE — 36415 COLL VENOUS BLD VENIPUNCTURE: CPT

## 2023-05-25 PROCEDURE — 85027 COMPLETE CBC AUTOMATED: CPT

## 2023-05-25 PROCEDURE — 84443 ASSAY THYROID STIM HORMONE: CPT

## 2023-06-03 ENCOUNTER — OFFICE VISIT (OUTPATIENT)
Dept: URGENT CARE | Facility: URGENT CARE | Age: 63
End: 2023-06-03
Payer: COMMERCIAL

## 2023-06-03 VITALS
SYSTOLIC BLOOD PRESSURE: 112 MMHG | RESPIRATION RATE: 14 BRPM | BODY MASS INDEX: 25.39 KG/M2 | OXYGEN SATURATION: 98 % | HEART RATE: 71 BPM | WEIGHT: 155 LBS | DIASTOLIC BLOOD PRESSURE: 53 MMHG | TEMPERATURE: 97.3 F

## 2023-06-03 DIAGNOSIS — R05.1 ACUTE COUGH: ICD-10-CM

## 2023-06-03 DIAGNOSIS — R50.9 FEVER, UNSPECIFIED FEVER CAUSE: ICD-10-CM

## 2023-06-03 DIAGNOSIS — R53.81 MALAISE: ICD-10-CM

## 2023-06-03 DIAGNOSIS — D72.819 LEUKOPENIA, UNSPECIFIED TYPE: Primary | ICD-10-CM

## 2023-06-03 DIAGNOSIS — R11.0 NAUSEA: ICD-10-CM

## 2023-06-03 DIAGNOSIS — R42 DIZZINESS: ICD-10-CM

## 2023-06-03 DIAGNOSIS — J20.9 ACUTE BRONCHITIS WITH COEXISTING CONDITION REQUIRING PROPHYLACTIC TREATMENT: ICD-10-CM

## 2023-06-03 LAB
ALBUMIN SERPL-MCNC: 3.8 G/DL (ref 3.4–5)
ALP SERPL-CCNC: 58 U/L (ref 40–150)
ALT SERPL W P-5'-P-CCNC: 23 U/L (ref 0–50)
ANION GAP SERPL CALCULATED.3IONS-SCNC: 6 MMOL/L (ref 3–14)
AST SERPL W P-5'-P-CCNC: 27 U/L (ref 0–45)
BASOPHILS # BLD AUTO: 0 10E3/UL (ref 0–0.2)
BASOPHILS NFR BLD AUTO: 0 %
BILIRUB SERPL-MCNC: 0.8 MG/DL (ref 0.2–1.3)
BUN SERPL-MCNC: 14 MG/DL (ref 7–30)
CALCIUM SERPL-MCNC: 9.4 MG/DL (ref 8.5–10.1)
CHLORIDE BLD-SCNC: 94 MMOL/L (ref 94–109)
CO2 SERPL-SCNC: 31 MMOL/L (ref 20–32)
CREAT SERPL-MCNC: 1 MG/DL (ref 0.52–1.04)
EOSINOPHIL # BLD AUTO: 0 10E3/UL (ref 0–0.7)
EOSINOPHIL NFR BLD AUTO: 0 %
ERYTHROCYTE [DISTWIDTH] IN BLOOD BY AUTOMATED COUNT: 12.4 % (ref 10–15)
GFR SERPL CREATININE-BSD FRML MDRD: 62 ML/MIN/1.73M2
GLUCOSE BLD-MCNC: 124 MG/DL (ref 70–99)
HCT VFR BLD AUTO: 37.6 % (ref 35–47)
HGB BLD-MCNC: 12.1 G/DL (ref 11.7–15.7)
IMM GRANULOCYTES # BLD: 0 10E3/UL
IMM GRANULOCYTES NFR BLD: 0 %
LYMPHOCYTES # BLD AUTO: 0.6 10E3/UL (ref 0.8–5.3)
LYMPHOCYTES NFR BLD AUTO: 13 %
MCH RBC QN AUTO: 30.3 PG (ref 26.5–33)
MCHC RBC AUTO-ENTMCNC: 32.2 G/DL (ref 31.5–36.5)
MCV RBC AUTO: 94 FL (ref 78–100)
MONOCYTES # BLD AUTO: 0.4 10E3/UL (ref 0–1.3)
MONOCYTES NFR BLD AUTO: 8 %
NEUTROPHILS # BLD AUTO: 3.6 10E3/UL (ref 1.6–8.3)
NEUTROPHILS NFR BLD AUTO: 78 %
PLATELET # BLD AUTO: 122 10E3/UL (ref 150–450)
POTASSIUM BLD-SCNC: 4.3 MMOL/L (ref 3.4–5.3)
PROT SERPL-MCNC: 6.7 G/DL (ref 6.8–8.8)
RBC # BLD AUTO: 3.99 10E6/UL (ref 3.8–5.2)
SODIUM SERPL-SCNC: 131 MMOL/L (ref 133–144)
WBC # BLD AUTO: 4.6 10E3/UL (ref 4–11)

## 2023-06-03 PROCEDURE — 99214 OFFICE O/P EST MOD 30 MIN: CPT | Performed by: NURSE PRACTITIONER

## 2023-06-03 PROCEDURE — 36415 COLL VENOUS BLD VENIPUNCTURE: CPT | Performed by: NURSE PRACTITIONER

## 2023-06-03 PROCEDURE — 85025 COMPLETE CBC W/AUTO DIFF WBC: CPT | Performed by: NURSE PRACTITIONER

## 2023-06-03 PROCEDURE — 80053 COMPREHEN METABOLIC PANEL: CPT | Performed by: NURSE PRACTITIONER

## 2023-06-03 RX ORDER — BENZONATATE 200 MG/1
200 CAPSULE ORAL 3 TIMES DAILY PRN
Qty: 30 CAPSULE | Refills: 0 | Status: SHIPPED | OUTPATIENT
Start: 2023-06-03 | End: 2023-11-20

## 2023-06-03 RX ORDER — LEVOFLOXACIN 500 MG/1
500 TABLET, FILM COATED ORAL DAILY
Qty: 7 TABLET | Refills: 0 | Status: SHIPPED | OUTPATIENT
Start: 2023-06-03 | End: 2023-06-10

## 2023-06-03 NOTE — PATIENT INSTRUCTIONS
Results for orders placed or performed in visit on 06/03/23   Comprehensive metabolic panel (BMP + Alb, Alk Phos, ALT, AST, Total. Bili, TP)     Status: Abnormal   Result Value Ref Range    Sodium 131 (L) 133 - 144 mmol/L    Potassium 4.3 3.4 - 5.3 mmol/L    Chloride 94 94 - 109 mmol/L    Carbon Dioxide (CO2) 31 20 - 32 mmol/L    Anion Gap 6 3 - 14 mmol/L    Urea Nitrogen 14 7 - 30 mg/dL    Creatinine 1.00 0.52 - 1.04 mg/dL    Calcium 9.4 8.5 - 10.1 mg/dL    Glucose 124 (H) 70 - 99 mg/dL    Alkaline Phosphatase 58 40 - 150 U/L    AST 27 0 - 45 U/L    ALT 23 0 - 50 U/L    Protein Total 6.7 (L) 6.8 - 8.8 g/dL    Albumin 3.8 3.4 - 5.0 g/dL    Bilirubin Total 0.8 0.2 - 1.3 mg/dL    GFR Estimate 62 >60 mL/min/1.73m2   CBC with platelets and differential     Status: Abnormal   Result Value Ref Range    WBC Count 4.6 4.0 - 11.0 10e3/uL    RBC Count 3.99 3.80 - 5.20 10e6/uL    Hemoglobin 12.1 11.7 - 15.7 g/dL    Hematocrit 37.6 35.0 - 47.0 %    MCV 94 78 - 100 fL    MCH 30.3 26.5 - 33.0 pg    MCHC 32.2 31.5 - 36.5 g/dL    RDW 12.4 10.0 - 15.0 %    Platelet Count 122 (L) 150 - 450 10e3/uL    % Neutrophils 78 %    % Lymphocytes 13 %    % Monocytes 8 %    % Eosinophils 0 %    % Basophils 0 %    % Immature Granulocytes 0 %    Absolute Neutrophils 3.6 1.6 - 8.3 10e3/uL    Absolute Lymphocytes 0.6 (L) 0.8 - 5.3 10e3/uL    Absolute Monocytes 0.4 0.0 - 1.3 10e3/uL    Absolute Eosinophils 0.0 0.0 - 0.7 10e3/uL    Absolute Basophils 0.0 0.0 - 0.2 10e3/uL    Absolute Immature Granulocytes 0.0 <=0.4 10e3/uL   CBC with platelets and differential     Status: Abnormal    Narrative    The following orders were created for panel order CBC with platelets and differential.  Procedure                               Abnormality         Status                     ---------                               -----------         ------                     CBC with platelets and d...[[id number]]  Abnormal            Final result                 Please  view results for these tests on the individual orders.     WBC elevated from her baseline.      Will treat bronchitis with levaquin given duration and normal leukopenia  Push fluids  Lots of handwashing.   Ibuprofen as needed for fever or pain  Delsym or dayquil/nyquil for cough as needed     Rest as able.   Will call if any other labs positive.    F/u in the clinic if symptoms persist or worsen.

## 2023-06-03 NOTE — PROGRESS NOTES
Assessment & Plan     Leukopenia, unspecified type  - CBC with platelets and differential  - Comprehensive metabolic panel (BMP + Alb, Alk Phos, ALT, AST, Total. Bili, TP)  - CBC with platelets and differential  - Comprehensive metabolic panel (BMP + Alb, Alk Phos, ALT, AST, Total. Bili, TP)  - levofloxacin (LEVAQUIN) 500 MG tablet  Dispense: 7 tablet; Refill: 0    Acute cough  - CBC with platelets and differential  - Comprehensive metabolic panel (BMP + Alb, Alk Phos, ALT, AST, Total. Bili, TP)  - CBC with platelets and differential  - Comprehensive metabolic panel (BMP + Alb, Alk Phos, ALT, AST, Total. Bili, TP)  - levofloxacin (LEVAQUIN) 500 MG tablet  Dispense: 7 tablet; Refill: 0  - benzonatate (TESSALON) 200 MG capsule  Dispense: 30 capsule; Refill: 0    Malaise  - CBC with platelets and differential  - Comprehensive metabolic panel (BMP + Alb, Alk Phos, ALT, AST, Total. Bili, TP)  - CBC with platelets and differential  - Comprehensive metabolic panel (BMP + Alb, Alk Phos, ALT, AST, Total. Bili, TP)  - levofloxacin (LEVAQUIN) 500 MG tablet  Dispense: 7 tablet; Refill: 0    Fever, unspecified fever cause  - CBC with platelets and differential  - Comprehensive metabolic panel (BMP + Alb, Alk Phos, ALT, AST, Total. Bili, TP)  - CBC with platelets and differential  - Comprehensive metabolic panel (BMP + Alb, Alk Phos, ALT, AST, Total. Bili, TP)  - levofloxacin (LEVAQUIN) 500 MG tablet  Dispense: 7 tablet; Refill: 0    Dizziness  - CBC with platelets and differential  - Comprehensive metabolic panel (BMP + Alb, Alk Phos, ALT, AST, Total. Bili, TP)  - CBC with platelets and differential  - Comprehensive metabolic panel (BMP + Alb, Alk Phos, ALT, AST, Total. Bili, TP)  - levofloxacin (LEVAQUIN) 500 MG tablet  Dispense: 7 tablet; Refill: 0    Nausea  - CBC with platelets and differential  - Comprehensive metabolic panel (BMP + Alb, Alk Phos, ALT, AST, Total. Bili, TP)  - CBC with platelets and differential  -  Comprehensive metabolic panel (BMP + Alb, Alk Phos, ALT, AST, Total. Bili, TP)  - levofloxacin (LEVAQUIN) 500 MG tablet  Dispense: 7 tablet; Refill: 0    Acute bronchitis with coexisting condition requiring prophylactic treatment  - levofloxacin (LEVAQUIN) 500 MG tablet  Dispense: 7 tablet; Refill: 0  - benzonatate (TESSALON) 200 MG capsule  Dispense: 30 capsule; Refill: 0     Patient Instructions     Results for orders placed or performed in visit on 06/03/23   Comprehensive metabolic panel (BMP + Alb, Alk Phos, ALT, AST, Total. Bili, TP)     Status: Abnormal   Result Value Ref Range    Sodium 131 (L) 133 - 144 mmol/L    Potassium 4.3 3.4 - 5.3 mmol/L    Chloride 94 94 - 109 mmol/L    Carbon Dioxide (CO2) 31 20 - 32 mmol/L    Anion Gap 6 3 - 14 mmol/L    Urea Nitrogen 14 7 - 30 mg/dL    Creatinine 1.00 0.52 - 1.04 mg/dL    Calcium 9.4 8.5 - 10.1 mg/dL    Glucose 124 (H) 70 - 99 mg/dL    Alkaline Phosphatase 58 40 - 150 U/L    AST 27 0 - 45 U/L    ALT 23 0 - 50 U/L    Protein Total 6.7 (L) 6.8 - 8.8 g/dL    Albumin 3.8 3.4 - 5.0 g/dL    Bilirubin Total 0.8 0.2 - 1.3 mg/dL    GFR Estimate 62 >60 mL/min/1.73m2   CBC with platelets and differential     Status: Abnormal   Result Value Ref Range    WBC Count 4.6 4.0 - 11.0 10e3/uL    RBC Count 3.99 3.80 - 5.20 10e6/uL    Hemoglobin 12.1 11.7 - 15.7 g/dL    Hematocrit 37.6 35.0 - 47.0 %    MCV 94 78 - 100 fL    MCH 30.3 26.5 - 33.0 pg    MCHC 32.2 31.5 - 36.5 g/dL    RDW 12.4 10.0 - 15.0 %    Platelet Count 122 (L) 150 - 450 10e3/uL    % Neutrophils 78 %    % Lymphocytes 13 %    % Monocytes 8 %    % Eosinophils 0 %    % Basophils 0 %    % Immature Granulocytes 0 %    Absolute Neutrophils 3.6 1.6 - 8.3 10e3/uL    Absolute Lymphocytes 0.6 (L) 0.8 - 5.3 10e3/uL    Absolute Monocytes 0.4 0.0 - 1.3 10e3/uL    Absolute Eosinophils 0.0 0.0 - 0.7 10e3/uL    Absolute Basophils 0.0 0.0 - 0.2 10e3/uL    Absolute Immature Granulocytes 0.0 <=0.4 10e3/uL   CBC with platelets and  differential     Status: Abnormal    Narrative    The following orders were created for panel order CBC with platelets and differential.  Procedure                               Abnormality         Status                     ---------                               -----------         ------                     CBC with platelets and d...[[id number]]  Abnormal            Final result                 Please view results for these tests on the individual orders.     WBC elevated from her baseline.      Will treat bronchitis with levaquin given duration and normal leukopenia  Push fluids  Lots of handwashing.   Ibuprofen as needed for fever or pain  Delsym or dayquil/nyquil for cough as needed     Rest as able.   Will call if any other labs positive.    F/u in the clinic if symptoms persist or worsen.      Return in about 1 week (around 6/10/2023) for with regular provider if symptoms persist.    [facility] DANTE Contreras is a 63 year old female who presents to clinic today for the following health issues:  Chief Complaint   Patient presents with     Fever     Headache     Generalized Body Aches     Vomiting     Dry Hives.     Cough     Productive cough yellowish mucus X 10 days.      Dizziness            HPI    URI Adult    Onset of symptoms was 10 day(s) ago.  Course of illness is worsening.    Severity moderately severe  Current and Associated symptoms: fever, chills, cough - productive, headache, body aches, nausea and vomiting  Treatment measures tried include Tylenol/Ibuprofen, Fluids and Rest.  Predisposing factors include None.      Review of Systems  Constitutional, HEENT, cardiovascular, pulmonary, GI, , musculoskeletal, neuro, skin, endocrine and psych systems are negative, except as otherwise noted.      Objective    /53 (BP Location: Left arm, Patient Position: Sitting, Cuff Size: Adult Large)   Pulse 71   Temp 97.3  F (36.3  C)  (Tympanic)   Resp 14   Wt 70.3 kg (155 lb)   SpO2 98%   BMI 25.39 kg/m    Physical Exam   GENERAL: healthy, alert and no distress  EYES: Eyes grossly normal to inspection, PERRL and conjunctivae and sclerae normal  HENT: ear canals and TM's normal, nose and mouth without ulcers or lesions  NECK: no adenopathy, no asymmetry, masses, or scars and thyroid normal to palpation  RESP: no rales , no rhonchi, no wheezes and decreased breath sounds bibasilar  CV: regular rate and rhythm, normal S1 S2, no S3 or S4, no murmur, click or rub, no peripheral edema and peripheral pulses strong  MS: no gross musculoskeletal defects noted, no edema  SKIN: no suspicious lesions or rashes

## 2023-08-30 ENCOUNTER — HOSPITAL ENCOUNTER (OUTPATIENT)
Dept: CARDIOLOGY | Facility: CLINIC | Age: 63
Discharge: HOME | End: 2023-08-30
Attending: INTERNAL MEDICINE | Admitting: INTERNAL MEDICINE
Payer: COMMERCIAL

## 2023-08-30 DIAGNOSIS — I35.9 AORTIC VALVE DISEASE: ICD-10-CM

## 2023-08-30 LAB — LVEF ECHO: NORMAL

## 2023-08-30 PROCEDURE — 93306 TTE W/DOPPLER COMPLETE: CPT | Mod: 26 | Performed by: INTERNAL MEDICINE

## 2023-08-30 PROCEDURE — 93306 TTE W/DOPPLER COMPLETE: CPT

## 2023-09-01 ENCOUNTER — OFFICE VISIT (OUTPATIENT)
Dept: CARDIOLOGY | Facility: CLINIC | Age: 63
End: 2023-09-01
Payer: COMMERCIAL

## 2023-09-01 VITALS
HEIGHT: 66 IN | WEIGHT: 159 LBS | HEART RATE: 64 BPM | DIASTOLIC BLOOD PRESSURE: 75 MMHG | BODY MASS INDEX: 25.55 KG/M2 | SYSTOLIC BLOOD PRESSURE: 121 MMHG | OXYGEN SATURATION: 100 %

## 2023-09-01 DIAGNOSIS — Q23.1 CONGENITAL INSUFFICIENCY OF AORTIC VALVE: Primary | ICD-10-CM

## 2023-09-01 PROCEDURE — 99214 OFFICE O/P EST MOD 30 MIN: CPT | Performed by: INTERNAL MEDICINE

## 2023-09-01 NOTE — PROGRESS NOTES
HPI and Plan:     Diane Ritter is a pleasant 64 y/o female here for routine follow up.  I have been seeing her every 2 years for enlargement of the aortic root and aortic insufficiency.  Current echocardiogram is showing mild aortic insufficiency and mild dilation of the aortic root at 4.1 cm.    PE:  unremarkable      No symptoms of chest pain, SOB, palpitations.  Staying active with biking and having no issues.      Today's clinic visit entailed:  Review of the result(s) of each unique test - echo  The following tests were independently interpreted by me as noted in my documentation: echocardiogram  Ordering of each unique test  Prescription drug management  20 minutes spent by me on the date of the encounter doing chart review, history and exam, documentation and further activities per the note  Provider  Link to MDM Help Grid     The level of medical decision making during this visit was of moderate complexity.      No orders of the defined types were placed in this encounter.    No orders of the defined types were placed in this encounter.    There are no discontinued medications.      No diagnosis found.    CURRENT MEDICATIONS:  Current Outpatient Medications   Medication Sig Dispense Refill    alendronate (FOSAMAX) 70 MG tablet Take 1 tablet (70 mg) by mouth every 7 days 12 tablet 3    Ascorbic Acid (VITAMIN C PO) Take 250 mg by mouth daily      Cholecalciferol (VITAMIN D) 1000 UNITS capsule Take 1 capsule by mouth daily In winter      ferrous sulfate (FEROSUL) 325 (65 Fe) MG tablet       FISH OIL 1,000 mg daily       levothyroxine (SYNTHROID/LEVOTHROID) 50 MCG tablet Take 1 tablet (50 mcg) by mouth daily 90 tablet 3    lisinopril (ZESTRIL) 10 MG tablet Take 1 tablet (10 mg) by mouth daily For blood pressure 90 tablet 1    rosuvastatin (CRESTOR) 5 MG tablet TAKE 1 TABLET DAILY FOR CHOLESTEROL 90 tablet 3    scopolamine (TRANSDERM) 1 MG/3DAYS 72 hr patch PLACE ONE PATCH ONTO THE SKIN EVERY 72 HOURS. APPLY  TO HAIRLESS AREA BEHIND ONE EAR AS NEEDED FOR NAUSEA. REMOVE OLD PATCH AND CHANGE EVERY 3 DAYS. 10 patch 1    aspirin 81 MG tablet Take by mouth every other day  (Patient not taking: Reported on 2023)      benzonatate (TESSALON) 200 MG capsule Take 1 capsule (200 mg) by mouth 3 times daily as needed for cough (Patient not taking: Reported on 2023) 30 capsule 0    EPINEPHrine (EPIPEN) 0.3 MG/0.3ML injection Inject 0.3 mg into the muscle once as needed for anaphylaxis (Patient not taking: Reported on 2023)      ondansetron (ZOFRAN ODT) 4 MG ODT tab Take 1 tablet (4 mg) by mouth every 6 hours as needed for nausea (Patient not taking: Reported on 2023) 15 tablet 1       ALLERGIES     Allergies   Allergen Reactions    Compazine Other (See Comments)     Facial tics    Shellfish Allergy Nausea and Vomiting and Rash    Sulfa Antibiotics Rash       PAST MEDICAL HISTORY:  Past Medical History:   Diagnosis Date    Aortic valve disorder-2-3 central aortic regugitation 3/26/2015    Cancer (H)     Squamous cell cancer of cervix    BHUMIKA (obstructive sleep apnea) 3/26/2015    Thyroid nodule     Venous insufficiency        PAST SURGICAL HISTORY:  Past Surgical History:   Procedure Laterality Date    APPENDECTOMY      BIOPSY      Cervix    COLONOSCOPY      GYN SURGERY      ovarian cysts removed     HYSTERECTOMY      HYSTERECTOMY VAGINAL      cervical cancer    ORTHOPEDIC SURGERY  2010    hand surgery on right side       FAMILY HISTORY:  Family History   Problem Relation Age of Onset    Heart Disease Mother         mitral valve disorder??    Rheumatoid Arthritis Mother     Osteoporosis Mother     Other - See Comments Father         abdominal aortic aneurysm    Hypertension Father     Colon Polyps Father     Other Cancer Father          of pancreatic Ca w/ mets to liver    Diabetes Brother     Pancreatic Cancer Brother     Colon Cancer Brother     Diabetes Brother     Hypertension Brother     Hyperlipidemia  "Brother     Thyroid Disease Sister         Hashimotos Thyroiditis    Breast Cancer No family hx of        SOCIAL HISTORY:  Social History     Socioeconomic History    Marital status:      Spouse name: None    Number of children: None    Years of education: None    Highest education level: None   Tobacco Use    Smoking status: Never     Passive exposure: Never    Smokeless tobacco: Never   Vaping Use    Vaping Use: Never used   Substance and Sexual Activity    Alcohol use: No    Drug use: No    Sexual activity: Yes     Partners: Male   Other Topics Concern    Caffeine Concern No    Sleep Concern Yes     Comment: sleep apnea, wears oral device    Stress Concern No    Weight Concern Yes     Comment: weight loss 15# over last year    Special Diet Yes     Comment: low sugar, no processed foods, juicing    Exercise Yes     Comment: 3 days week, biking, elliptical    Parent/sibling w/ CABG, MI or angioplasty before 65F 55M? No       Review of Systems:  Skin:        Eyes:       ENT:       Respiratory:  Positive for sleep apnea;CPAP  Cardiovascular:  Negative for;chest pain;palpitations;lightheadedness;dizziness;syncope or near-syncope;edema;fatigue    Gastroenterology:      Genitourinary:       Musculoskeletal:       Neurologic:       Psychiatric:       Heme/Lymph/Imm:  Negative    Endocrine:  Negative      Physical Exam:  Vitals: /75   Pulse 64   Ht 1.676 m (5' 6\")   Wt 72.1 kg (159 lb)   SpO2 100%   BMI 25.66 kg/m      Constitutional:           Skin:             Head:           Eyes:           Lymph:      ENT:           Neck:           Respiratory:            Cardiac:                                                           GI:           Extremities and Muscular Skeletal:                 Neurological:           Psych:         Recent Lab Results:  LIPID RESULTS:  Lab Results   Component Value Date    CHOL 161 11/04/2022    CHOL 161 12/30/2020    HDL 79 11/04/2022    HDL 68 12/30/2020    LDL 72 11/04/2022 "    LDL 82 12/30/2020    TRIG 48 11/04/2022    TRIG 54 12/30/2020    CHOLHDLRATIO 3.1 12/04/2008       LIVER ENZYME RESULTS:  Lab Results   Component Value Date    AST 27 06/03/2023    AST 27 10/29/2020    ALT 23 06/03/2023    ALT 36 12/30/2020       CBC RESULTS:  Lab Results   Component Value Date    WBC 4.6 06/03/2023    WBC 4.1 02/17/2021    RBC 3.99 06/03/2023    RBC 3.90 02/17/2021    HGB 12.1 06/03/2023    HGB 12.3 02/17/2021    HCT 37.6 06/03/2023    HCT 37.0 02/17/2021    MCV 94 06/03/2023    MCV 95 02/17/2021    MCH 30.3 06/03/2023    MCH 31.5 02/17/2021    MCHC 32.2 06/03/2023    MCHC 33.2 02/17/2021    RDW 12.4 06/03/2023    RDW 12.5 02/17/2021     (L) 06/03/2023     (L) 02/17/2021       BMP RESULTS:  Lab Results   Component Value Date     (L) 06/03/2023     10/29/2020    POTASSIUM 4.3 06/03/2023    POTASSIUM 4.0 10/29/2020    CHLORIDE 94 06/03/2023    CHLORIDE 105 10/29/2020    CO2 31 06/03/2023    CO2 31 10/29/2020    ANIONGAP 6 06/03/2023    ANIONGAP 4 10/29/2020     (H) 06/03/2023    GLC 86 10/29/2020    BUN 14 06/03/2023    BUN 19 10/29/2020    CR 1.00 06/03/2023    CR 0.80 10/29/2020    GFRESTIMATED 62 06/03/2023    GFRESTIMATED 79 10/29/2020    GFRESTBLACK >90 10/29/2020    GRACE 9.4 06/03/2023    GRACE 9.7 10/29/2020        A1C RESULTS:  No results found for: A1C    INR RESULTS:  No results found for: INR        CC  No referring provider defined for this encounter.

## 2023-09-01 NOTE — LETTER
9/1/2023    Angelica Marcano MD  [address]  [place]                [place] [id number]    RE: Diane Ritter       Dear Colleague,     I had the pleasure of seeing Diane Ritter in the [facility].  HPI and Plan:     Diane Ritter is a pleasant 62 y/o female here for routine follow up.  I have been seeing her every 2 years for enlargement of the aortic root and aortic insufficiency.  Current echocardiogram is showing mild aortic insufficiency and mild dilation of the aortic root at 4.1 cm.    PE:  unremarkable      No symptoms of chest pain, SOB, palpitations.  Staying active with biking and having no issues.      Today's clinic visit entailed:  Review of the result(s) of each unique test - echo  The following tests were independently interpreted by me as noted in my documentation: echocardiogram  Ordering of each unique test  Prescription drug management  20 minutes spent by me on the date of the encounter doing chart review, history and exam, documentation and further activities per the note  Provider  Link to [facility] Help Grid     The level of medical decision making during this visit was of moderate complexity.      No orders of the defined types were placed in this encounter.    No orders of the defined types were placed in this encounter.    There are no discontinued medications.      No diagnosis found.    CURRENT MEDICATIONS:  Current Outpatient Medications   Medication Sig Dispense Refill    alendronate (FOSAMAX) 70 MG tablet Take 1 tablet (70 mg) by mouth every 7 days 12 tablet 3    Ascorbic Acid (VITAMIN C PO) Take 250 mg by mouth daily      Cholecalciferol (VITAMIN D) 1000 UNITS capsule Take 1 capsule by mouth daily In winter      ferrous sulfate (FEROSUL) 325 (65 Fe) MG tablet       FISH OIL 1,000 mg daily       levothyroxine (SYNTHROID/LEVOTHROID) 50 MCG tablet Take 1 tablet (50 mcg) by mouth daily 90 tablet 3    lisinopril (ZESTRIL) 10 MG tablet Take 1 tablet (10 mg) by  mouth daily For blood pressure 90 tablet 1    rosuvastatin (CRESTOR) 5 MG tablet TAKE 1 TABLET DAILY FOR CHOLESTEROL 90 tablet 3    scopolamine (TRANSDERM) 1 MG/3DAYS 72 hr patch PLACE ONE PATCH ONTO THE SKIN EVERY 72 HOURS. APPLY TO HAIRLESS AREA BEHIND ONE EAR AS NEEDED FOR NAUSEA. REMOVE OLD PATCH AND CHANGE EVERY 3 DAYS. 10 patch 1    aspirin 81 MG tablet Take by mouth every other day  (Patient not taking: Reported on 9/1/2023)      benzonatate (TESSALON) 200 MG capsule Take 1 capsule (200 mg) by mouth 3 times daily as needed for cough (Patient not taking: Reported on 9/1/2023) 30 capsule 0    EPINEPHrine (EPIPEN) 0.3 MG/0.3ML injection Inject 0.3 mg into the muscle once as needed for anaphylaxis (Patient not taking: Reported on 9/1/2023)      ondansetron (ZOFRAN ODT) 4 MG ODT tab Take 1 tablet (4 mg) by mouth every 6 hours as needed for nausea (Patient not taking: Reported on 9/1/2023) 15 tablet 1       ALLERGIES     Allergies   Allergen Reactions    Compazine Other (See Comments)     Facial tics    Shellfish Allergy Nausea and Vomiting and Rash    Sulfa Antibiotics Rash       PAST MEDICAL HISTORY:  Past Medical History:   Diagnosis Date    Aortic valve disorder-2-3 central aortic regugitation 3/26/2015    Cancer (H) 1991    Squamous cell cancer of cervix    BHUMIKA (obstructive sleep apnea) 3/26/2015    Thyroid nodule     Venous insufficiency        PAST SURGICAL HISTORY:  Past Surgical History:   Procedure Laterality Date    APPENDECTOMY      BIOPSY  1991    Cervix    COLONOSCOPY      GYN SURGERY      ovarian cysts removed     HYSTERECTOMY      HYSTERECTOMY VAGINAL      cervical cancer    ORTHOPEDIC SURGERY  2010    hand surgery on right side       FAMILY HISTORY:  Family History   Problem Relation Age of Onset    Heart Disease Mother         mitral valve disorder??    Rheumatoid Arthritis Mother     Osteoporosis Mother     Other - See Comments Father         abdominal aortic aneurysm    Hypertension Father      "Colon Polyps Father     Other Cancer Father          of pancreatic Ca w/ mets to liver    Diabetes Brother     Pancreatic Cancer Brother     Colon Cancer Brother     Diabetes Brother     Hypertension Brother     Hyperlipidemia Brother     Thyroid Disease Sister         Hashimotos Thyroiditis    Breast Cancer No family hx of        SOCIAL HISTORY:  Social History     Socioeconomic History    Marital status:      Spouse name: None    Number of children: None    Years of education: None    Highest education level: None   Tobacco Use    Smoking status: Never     Passive exposure: Never    Smokeless tobacco: Never   Vaping Use    Vaping Use: Never used   Substance and Sexual Activity    Alcohol use: No    Drug use: No    Sexual activity: Yes     Partners: Male   Other Topics Concern    Caffeine Concern No    Sleep Concern Yes     Comment: sleep apnea, wears oral device    Stress Concern No    Weight Concern Yes     Comment: weight loss 15# over last year    Special Diet Yes     Comment: low sugar, no processed foods, juicing    Exercise Yes     Comment: 3 days week, biking, elliptical    Parent/sibling w/ CABG, MI or angioplasty before 65F 55M? No       Review of Systems:  Skin:        Eyes:       ENT:       Respiratory:  Positive for sleep apnea;CPAP  Cardiovascular:  Negative for;chest pain;palpitations;lightheadedness;dizziness;syncope or near-syncope;edema;fatigue    Gastroenterology:      Genitourinary:       Musculoskeletal:       Neurologic:       Psychiatric:       Heme/Lymph/Imm:  Negative    Endocrine:  Negative      Physical Exam:  Vitals: /75   Pulse 64   Ht 1.676 m (5' 6\")   Wt 72.1 kg (159 lb)   SpO2 100%   BMI 25.66 kg/m      Constitutional:           Skin:             Head:           Eyes:           Lymph:      ENT:           Neck:           Respiratory:            Cardiac:                                                           GI:           Extremities and Muscular Skeletal:      "            Neurological:           Psych:         Recent Lab Results:  LIPID RESULTS:  Lab Results   Component Value Date    CHOL 161 11/04/2022    CHOL 161 12/30/2020    HDL 79 11/04/2022    HDL 68 12/30/2020    LDL 72 11/04/2022    LDL 82 12/30/2020    TRIG 48 11/04/2022    TRIG 54 12/30/2020    CHOLHDLRATIO 3.1 12/04/2008       LIVER ENZYME RESULTS:  Lab Results   Component Value Date    AST 27 06/03/2023    AST 27 10/29/2020    ALT 23 06/03/2023    ALT 36 12/30/2020       CBC RESULTS:  Lab Results   Component Value Date    WBC 4.6 06/03/2023    WBC 4.1 02/17/2021    RBC 3.99 06/03/2023    RBC 3.90 02/17/2021    HGB 12.1 06/03/2023    HGB 12.3 02/17/2021    HCT 37.6 06/03/2023    HCT 37.0 02/17/2021    MCV 94 06/03/2023    MCV 95 02/17/2021    MCH 30.3 06/03/2023    MCH 31.5 02/17/2021    MCHC 32.2 06/03/2023    MCHC 33.2 02/17/2021    RDW 12.4 06/03/2023    RDW 12.5 02/17/2021     (L) 06/03/2023     (L) 02/17/2021       BMP RESULTS:  Lab Results   Component Value Date     (L) 06/03/2023     10/29/2020    POTASSIUM 4.3 06/03/2023    POTASSIUM 4.0 10/29/2020    CHLORIDE 94 06/03/2023    CHLORIDE 105 10/29/2020    CO2 31 06/03/2023    CO2 31 10/29/2020    ANIONGAP 6 06/03/2023    ANIONGAP 4 10/29/2020     (H) 06/03/2023    GLC 86 10/29/2020    BUN 14 06/03/2023    BUN 19 10/29/2020    CR 1.00 06/03/2023    CR 0.80 10/29/2020    GFRESTIMATED 62 06/03/2023    GFRESTIMATED 79 10/29/2020    GFRESTBLACK >90 10/29/2020    GRACE 9.4 06/03/2023    GRACE 9.7 10/29/2020        A1C RESULTS:  No results found for: A1C    INR RESULTS:  No results found for: INR        CC  No referring provider defined for this encounter.      Thank you for allowing me to participate in the care of your patient.      Sincerely,     RENNY LOONEY MD     [facility]

## 2023-10-18 DIAGNOSIS — I35.1 NONRHEUMATIC AORTIC VALVE INSUFFICIENCY: ICD-10-CM

## 2023-10-19 RX ORDER — LISINOPRIL 10 MG/1
10 TABLET ORAL DAILY
Qty: 90 TABLET | Refills: 0 | Status: SHIPPED | OUTPATIENT
Start: 2023-10-19 | End: 2024-01-08

## 2023-11-03 ENCOUNTER — ANCILLARY PROCEDURE (OUTPATIENT)
Dept: MAMMOGRAPHY | Facility: CLINIC | Age: 63
End: 2023-11-03
Attending: INTERNAL MEDICINE
Payer: COMMERCIAL

## 2023-11-03 DIAGNOSIS — Z12.31 VISIT FOR SCREENING MAMMOGRAM: ICD-10-CM

## 2023-11-03 PROCEDURE — 77063 BREAST TOMOSYNTHESIS BI: CPT | Mod: TC | Performed by: STUDENT IN AN ORGANIZED HEALTH CARE EDUCATION/TRAINING PROGRAM

## 2023-11-03 PROCEDURE — 77067 SCR MAMMO BI INCL CAD: CPT | Mod: TC | Performed by: STUDENT IN AN ORGANIZED HEALTH CARE EDUCATION/TRAINING PROGRAM

## 2023-11-10 NOTE — LETTER
9/7/2021    Angelica Marcano MD  [address]  [facility] [address]    RE: Diane M Stone       Dear Colleague,    I had the pleasure of seeing Diane Ritter in the [facility].    HPI and Plan:     Diane is a very pleasant 61-year-old who comes in for her visit every 2 years.  Been following her for aortic root enlargement as well as aortic insufficiency.  She continues to be active and is completely asymptomatic.  She denies any chest pain chest pressure shortness of breath heart racing palpitations or edema.    We personally reviewed her echocardiogram results.  Her aortic root interestingly is down to 3.4 and her aortic insufficiency is 1-2+.  I think this is meaning or at least has been no significant progression.  I would not put too much stock in the actual measurements of the aortic root on echocardiogram.  Probably off axis and foreshortened.  Her exam is stable.  Recommend follow-up in 2 years time.    Orders Placed This Encounter   Procedures     Follow-Up with Cardiologist     Echocardiogram Complete     No orders of the defined types were placed in this encounter.    There are no discontinued medications.      Encounter Diagnosis   Name Primary?     Aortic valve disease Yes       CURRENT MEDICATIONS:  Current Outpatient Medications   Medication Sig Dispense Refill     Ascorbic Acid (VITAMIN C PO) Take 250 mg by mouth daily       aspirin 81 MG tablet Take by mouth every other day        Cholecalciferol (VITAMIN D) 1000 UNITS capsule Take 1 capsule by mouth daily In winter       EPINEPHrine (EPIPEN) 0.3 MG/0.3ML injection Inject 0.3 mg into the muscle once as needed for anaphylaxis       FISH OIL 1,000 mg daily        levothyroxine (SYNTHROID/LEVOTHROID) 50 MCG tablet TAKE 1 TABLET DAILY FOR HYPOTHYROIDISM 90 tablet 3     lisinopril (ZESTRIL) 10 MG tablet TAKE 1 TABLET DAILY FOR HIGH BLOOD PRESSURE 90 tablet 3      Multiple Vitamin (DAILY MULTIVITAMIN PO) Take by mouth daily        rosuvastatin (CRESTOR) 5 MG tablet Take 1 tablet (5 mg) by mouth daily for cholestrol 90 tablet 2     scopolamine (TRANSDERM-SCOP, 1.5 MG,) 1 MG/3DAYS 72 hr patch Place 1 patch onto the skin every 72 hours as needed For nausea 10 patch 1       ALLERGIES     Allergies   Allergen Reactions     Compazine Other (See Comments)     Facial tics     Shellfish Allergy Nausea and Vomiting and Rash     Sulfa Drugs Rash       PAST MEDICAL HISTORY:  Past Medical History:   Diagnosis Date     Aortic valve disorder-2-3 central aortic regugitation 3/26/2015     BHUMIKA (obstructive sleep apnea) 3/26/2015     Thyroid nodule      Venous insufficiency        PAST SURGICAL HISTORY:  Past Surgical History:   Procedure Laterality Date     APPENDECTOMY       GYN SURGERY      ovarian cysts removed      HYSTERECTOMY VAGINAL      cervical cancer     ORTHOPEDIC SURGERY  2010    hand surgery on right side       FAMILY HISTORY:  Family History   Problem Relation Age of Onset     Heart Disease Mother         mitral valve disorder??     Rheumatoid Arthritis Mother      Other - See Comments Father         abdominal aortic aneurysm     Hypertension Father      Colon Polyps Father      Diabetes Brother      Pancreatic Cancer Brother      Diabetes Brother      Hypertension Brother      Breast Cancer No family hx of        SOCIAL HISTORY:  Social History     Socioeconomic History     Marital status:      Spouse name: None     Number of children: None     Years of education: None     Highest education level: None   Occupational History     None   Tobacco Use     Smoking status: Never Smoker     Smokeless tobacco: Never Used   Substance and Sexual Activity     Alcohol use: No     Drug use: No     Sexual activity: Yes     Partners: Male   Other Topics Concern      Service Not Asked     Blood Transfusions Not Asked     Caffeine Concern No     Occupational Exposure Not Asked      Hobby Hazards Not Asked     Sleep Concern Yes     Comment: sleep apnea, wears oral device     Stress Concern No     Weight Concern Yes     Comment: weight loss 15# over last year     Special Diet Yes     Comment: low sugar, no processed foods, juicing     Back Care Not Asked     Exercise Yes     Comment: 3 days week, biking, elliptical     Bike Helmet Not Asked     Seat Belt Not Asked     Self-Exams Not Asked     Parent/sibling w/ CABG, MI or angioplasty before 65F 55M? Not Asked   Social History Narrative     None     Social Determinants of Health     Financial Resource Strain:      Difficulty of Paying Living Expenses:    Food Insecurity:      Worried About Running Out of Food in the Last Year:      Ran Out of Food in the Last Year:    Transportation Needs:      Lack of Transportation (Medical):      Lack of Transportation (Non-Medical):    Physical Activity:      Days of Exercise per Week:      Minutes of Exercise per Session:    Stress:      Feeling of Stress :    Social Connections:      Frequency of Communication with Friends and Family:      Frequency of Social Gatherings with Friends and Family:      Attends Oriental orthodox Services:      Active Member of Clubs or Organizations:      Attends Club or Organization Meetings:      Marital Status:    Intimate Partner Violence:      Fear of Current or Ex-Partner:      Emotionally Abused:      Physically Abused:      Sexually Abused:        Review of Systems:  Skin:  Negative     Eyes:  Positive for glasses  ENT:  Negative    Respiratory:  Positive for sleep apnea  Cardiovascular:  Negative;chest pain;lightheadedness;dizziness;syncope or near-syncope;cyanosis;exercise intolerance;fatigue Positive for;palpitations;edema  Gastroenterology: Negative    Genitourinary:  Negative    Musculoskeletal:  Positive for back pain  Neurologic:  Negative numbness or tingling of feet  Psychiatric:  Negative    Heme/Lymph/Imm:  Positive for    Endocrine:  Negative            Physical  "Exam:  Vitals: /66   Pulse 65   Ht 1.664 m (5' 5.5\")   Wt 70.8 kg (156 lb)   BMI 25.56 kg/m    Constitutional: cooperative, alert and oriented, well developed, well nourished, in no acute distress   Skin: warm and dry to the touch, no apparent skin lesions or masses noted   Head: normocephalic, no masses or lesions   Eyes: pupils equal and round, conjunctivae and lids unremarkable, sclera white, no xanthalasma, EOMS intact, no nystagmus   ENT: no pallor or cyanosis, dentition good   Neck: carotid pulses are full and equal bilaterally, JVP normal, no carotid bruit, no thyromegaly   Chest: normal breath sounds, clear to auscultation, normal A-P diameter, normal symmetry, normal respiratory excursion, no use of accessory muscles   Cardiac: regular rhythm, normal S1/S2, no S3 or S4, apical impulse not displaced, no murmurs, gallops or rubs no presence of murmur   Abdomen: abdomen soft, non-tender, BS normoactive, no mass, no HSM, no bruits   Vascular: pulses full and equal, no bruits auscultated   Extremities and Back: no deformities, clubbing, cyanosis, erythema observed   Neurological: affect appropriate, oriented to time, person and place     Recent Lab Results:  LIPID RESULTS:  Lab Results   Component Value Date    CHOL 161 12/30/2020    HDL 68 12/30/2020    LDL 82 12/30/2020    TRIG 54 12/30/2020    CHOLHDLRATIO 3.1 12/04/2008       LIVER ENZYME RESULTS:  Lab Results   Component Value Date    AST 27 10/29/2020    ALT 36 12/30/2020       CBC RESULTS:  Lab Results   Component Value Date    WBC 4.5 08/17/2021    WBC 4.1 02/17/2021    RBC 4.06 08/17/2021    RBC 3.90 02/17/2021    HGB 13.2 08/17/2021    HGB 12.3 02/17/2021    HCT 38.7 08/17/2021    HCT 37.0 02/17/2021    MCV 95 08/17/2021    MCV 95 02/17/2021    MCH 32.5 08/17/2021    MCH 31.5 02/17/2021    MCHC 34.1 08/17/2021    MCHC 33.2 02/17/2021    RDW 13.1 08/17/2021    RDW 12.5 02/17/2021     08/17/2021     (L) 02/17/2021       BMP " RESULTS:  Lab Results   Component Value Date     10/29/2020    POTASSIUM 4.0 10/29/2020    CHLORIDE 105 10/29/2020    CO2 31 10/29/2020    ANIONGAP 4 10/29/2020    GLC 86 10/29/2020    BUN 19 10/29/2020    CR 0.80 10/29/2020    GFRESTIMATED 79 10/29/2020    GFRESTBLACK >90 10/29/2020    GRACE 9.7 10/29/2020        A1C RESULTS:  No results found for: A1C    INR RESULTS:  No results found for: INR        CC  No referring provider defined for this encounter.                      Thank you for allowing me to participate in the care of your patient.      Sincerely,     RENNY LOONEY MD     [facility]  cc:   No referring provider defined for this encounter.         None

## 2023-11-17 ASSESSMENT — ENCOUNTER SYMPTOMS
BREAST MASS: 0
SORE THROAT: 0
NERVOUS/ANXIOUS: 0
FEVER: 0
ARTHRALGIAS: 0
PALPITATIONS: 0
DIARRHEA: 0
DIZZINESS: 0
SHORTNESS OF BREATH: 0
HEARTBURN: 0
HEMATOCHEZIA: 0
WEAKNESS: 0
HEMATURIA: 0
CHILLS: 0
JOINT SWELLING: 0
PARESTHESIAS: 0
MYALGIAS: 0
FREQUENCY: 0
NAUSEA: 0
EYE PAIN: 0
HEADACHES: 0
COUGH: 0
CONSTIPATION: 0
DYSURIA: 0
ABDOMINAL PAIN: 0

## 2023-11-17 ASSESSMENT — ACTIVITIES OF DAILY LIVING (ADL): CURRENT_FUNCTION: NO ASSISTANCE NEEDED

## 2023-11-20 ENCOUNTER — OFFICE VISIT (OUTPATIENT)
Dept: FAMILY MEDICINE | Facility: CLINIC | Age: 63
End: 2023-11-20
Payer: COMMERCIAL

## 2023-11-20 VITALS
WEIGHT: 152.9 LBS | RESPIRATION RATE: 14 BRPM | TEMPERATURE: 98.4 F | BODY MASS INDEX: 25.47 KG/M2 | HEIGHT: 65 IN | HEART RATE: 72 BPM | SYSTOLIC BLOOD PRESSURE: 123 MMHG | DIASTOLIC BLOOD PRESSURE: 76 MMHG | OXYGEN SATURATION: 99 %

## 2023-11-20 DIAGNOSIS — I77.810 AORTIC ROOT DILATATION (H): ICD-10-CM

## 2023-11-20 DIAGNOSIS — Z00.00 ENCOUNTER FOR MEDICARE ANNUAL WELLNESS EXAM: Primary | ICD-10-CM

## 2023-11-20 DIAGNOSIS — I35.1 NONRHEUMATIC AORTIC VALVE INSUFFICIENCY: ICD-10-CM

## 2023-11-20 DIAGNOSIS — D72.819 LEUKOPENIA, UNSPECIFIED TYPE: ICD-10-CM

## 2023-11-20 DIAGNOSIS — D69.6 THROMBOCYTOPENIA (H): ICD-10-CM

## 2023-11-20 DIAGNOSIS — E03.9 HYPOTHYROIDISM, UNSPECIFIED TYPE: ICD-10-CM

## 2023-11-20 DIAGNOSIS — Z78.0 ASYMPTOMATIC POSTMENOPAUSAL STATUS: ICD-10-CM

## 2023-11-20 DIAGNOSIS — R11.0 NAUSEA: ICD-10-CM

## 2023-11-20 DIAGNOSIS — E87.1 HYPONATREMIA: ICD-10-CM

## 2023-11-20 DIAGNOSIS — Z79.899 MEDICATION MANAGEMENT: ICD-10-CM

## 2023-11-20 DIAGNOSIS — Q23.81 BICUSPID AORTIC VALVE: ICD-10-CM

## 2023-11-20 DIAGNOSIS — R73.09 ELEVATED GLUCOSE: ICD-10-CM

## 2023-11-20 DIAGNOSIS — E78.5 HYPERLIPIDEMIA, UNSPECIFIED HYPERLIPIDEMIA TYPE: ICD-10-CM

## 2023-11-20 DIAGNOSIS — M85.80 OSTEOPENIA WITH HIGH RISK OF FRACTURE: ICD-10-CM

## 2023-11-20 DIAGNOSIS — Z29.11 NEED FOR VACCINATION AGAINST RESPIRATORY SYNCYTIAL VIRUS: ICD-10-CM

## 2023-11-20 LAB
ALBUMIN SERPL BCG-MCNC: 4.5 G/DL (ref 3.5–5.2)
ALP SERPL-CCNC: 55 U/L (ref 40–150)
ALT SERPL W P-5'-P-CCNC: 26 U/L (ref 0–50)
ANION GAP SERPL CALCULATED.3IONS-SCNC: 8 MMOL/L (ref 7–15)
AST SERPL W P-5'-P-CCNC: 29 U/L (ref 0–45)
BILIRUB SERPL-MCNC: 0.7 MG/DL
BUN SERPL-MCNC: 19.9 MG/DL (ref 8–23)
CALCIUM SERPL-MCNC: 10 MG/DL (ref 8.8–10.2)
CHLORIDE SERPL-SCNC: 103 MMOL/L (ref 98–107)
CHOLEST SERPL-MCNC: 185 MG/DL
CREAT SERPL-MCNC: 0.8 MG/DL (ref 0.51–0.95)
DEPRECATED HCO3 PLAS-SCNC: 30 MMOL/L (ref 22–29)
EGFRCR SERPLBLD CKD-EPI 2021: 81 ML/MIN/1.73M2
ERYTHROCYTE [DISTWIDTH] IN BLOOD BY AUTOMATED COUNT: 13.2 % (ref 10–15)
GLUCOSE SERPL-MCNC: 90 MG/DL (ref 70–99)
HBA1C MFR BLD: 5.7 % (ref 0–5.6)
HCT VFR BLD AUTO: 38.9 % (ref 35–47)
HDLC SERPL-MCNC: 72 MG/DL
HGB BLD-MCNC: 12.3 G/DL (ref 11.7–15.7)
LDLC SERPL CALC-MCNC: 100 MG/DL
MCH RBC QN AUTO: 30.5 PG (ref 26.5–33)
MCHC RBC AUTO-ENTMCNC: 31.6 G/DL (ref 31.5–36.5)
MCV RBC AUTO: 97 FL (ref 78–100)
NONHDLC SERPL-MCNC: 113 MG/DL
PLATELET # BLD AUTO: 183 10E3/UL (ref 150–450)
POTASSIUM SERPL-SCNC: 4.4 MMOL/L (ref 3.4–5.3)
PROT SERPL-MCNC: 7.1 G/DL (ref 6.4–8.3)
RBC # BLD AUTO: 4.03 10E6/UL (ref 3.8–5.2)
SODIUM SERPL-SCNC: 141 MMOL/L (ref 135–145)
TRIGL SERPL-MCNC: 64 MG/DL
TSH SERPL DL<=0.005 MIU/L-ACNC: 1.72 UIU/ML (ref 0.3–4.2)
WBC # BLD AUTO: 3.4 10E3/UL (ref 4–11)

## 2023-11-20 PROCEDURE — 80053 COMPREHEN METABOLIC PANEL: CPT | Performed by: INTERNAL MEDICINE

## 2023-11-20 PROCEDURE — G0438 PPPS, INITIAL VISIT: HCPCS | Performed by: INTERNAL MEDICINE

## 2023-11-20 PROCEDURE — 85027 COMPLETE CBC AUTOMATED: CPT | Performed by: INTERNAL MEDICINE

## 2023-11-20 PROCEDURE — 80061 LIPID PANEL: CPT | Performed by: INTERNAL MEDICINE

## 2023-11-20 PROCEDURE — 99214 OFFICE O/P EST MOD 30 MIN: CPT | Mod: 25 | Performed by: INTERNAL MEDICINE

## 2023-11-20 PROCEDURE — 83036 HEMOGLOBIN GLYCOSYLATED A1C: CPT | Performed by: INTERNAL MEDICINE

## 2023-11-20 PROCEDURE — 36415 COLL VENOUS BLD VENIPUNCTURE: CPT | Performed by: INTERNAL MEDICINE

## 2023-11-20 PROCEDURE — 84443 ASSAY THYROID STIM HORMONE: CPT | Performed by: INTERNAL MEDICINE

## 2023-11-20 RX ORDER — LEVOTHYROXINE SODIUM 50 UG/1
50 TABLET ORAL DAILY
Qty: 90 TABLET | Refills: 3 | Status: SHIPPED | OUTPATIENT
Start: 2023-11-20

## 2023-11-20 RX ORDER — RESPIRATORY SYNCYTIAL VIRUS VACCINE 120MCG/0.5
0.5 KIT INTRAMUSCULAR ONCE
Qty: 1 EACH | Refills: 0 | Status: CANCELLED | OUTPATIENT
Start: 2023-11-20 | End: 2023-11-20

## 2023-11-20 RX ORDER — SCOLOPAMINE TRANSDERMAL SYSTEM 1 MG/1
PATCH, EXTENDED RELEASE TRANSDERMAL
Qty: 10 PATCH | Refills: 1 | Status: SHIPPED | OUTPATIENT
Start: 2023-11-20

## 2023-11-20 RX ORDER — ALENDRONATE SODIUM 70 MG/1
70 TABLET ORAL
Qty: 12 TABLET | Refills: 3 | Status: SHIPPED | OUTPATIENT
Start: 2023-11-20 | End: 2024-01-09

## 2023-11-20 RX ORDER — ROSUVASTATIN CALCIUM 5 MG/1
5 TABLET, COATED ORAL DAILY
Qty: 90 TABLET | Refills: 3 | Status: SHIPPED | OUTPATIENT
Start: 2023-11-20

## 2023-11-20 ASSESSMENT — ACTIVITIES OF DAILY LIVING (ADL): CURRENT_FUNCTION: NO ASSISTANCE NEEDED

## 2023-11-20 ASSESSMENT — ENCOUNTER SYMPTOMS
MYALGIAS: 0
ARTHRALGIAS: 0
COUGH: 0
EYE PAIN: 0
ABDOMINAL PAIN: 0
HEARTBURN: 0
FREQUENCY: 0
CONSTIPATION: 0
PALPITATIONS: 0
PARESTHESIAS: 0
NERVOUS/ANXIOUS: 0
CHILLS: 0
HEADACHES: 0
FEVER: 0
WEAKNESS: 0
JOINT SWELLING: 0
SORE THROAT: 0
DYSURIA: 0
HEMATOCHEZIA: 0
DIARRHEA: 0
HEMATURIA: 0
BREAST MASS: 0
NAUSEA: 0
SHORTNESS OF BREATH: 0
DIZZINESS: 0

## 2023-11-20 ASSESSMENT — PAIN SCALES - GENERAL: PAINLEVEL: NO PAIN (0)

## 2023-11-20 NOTE — PROGRESS NOTES
"SUBJECTIVE:   Diane is a 63 year old, presenting for the following:  Physical (Fasting )         No data to display                Are you in the first 12 months of your Medicare coverage?  Yes,  Visual Acuity:  Right Eye: 20/25   Left Eye: 20/25  Both Eyes: 20/20 ( with glasses)    Healthy Habits:     In general, how would you rate your overall health?  Good    Frequency of exercise:  6-7 days/week    Duration of exercise:  30-45 minutes    Do you usually eat at least 4 servings of fruit and vegetables a day, include whole grains    & fiber and avoid regularly eating high fat or \"junk\" foods?  Yes    Taking medications regularly:  Yes    Medication side effects:  None    Ability to successfully perform activities of daily living:  No assistance needed    Home Safety:  No safety concerns identified    Hearing Impairment:  No hearing concerns    In the past 6 months, have you been bothered by leaking of urine?  No    In general, how would you rate your overall mental or emotional health?  Good    Additional concerns today:  No          Have you ever done Advance Care Planning? (For example, a Health Directive, POLST, or a discussion with a medical provider or your loved ones about your wishes): Yes, patient states has an Advance Care Planning document and will bring a copy to the clinic.       Fall risk  Fallen 2 or more times in the past year?: No  Any fall with injury in the past year?: No    Cognitive Screening   1) Repeat 3 items (Leader, Season, Table)    2) Clock draw: NORMAL  3) 3 item recall: Recalls 1 object   Results: NORMAL clock, 1-2 items recalled: COGNITIVE IMPAIRMENT LESS LIKELY    Mini-CogTM Copyright ANURADHA Nelson. Licensed by the author for use in [facility]; reprinted with permission (cj@.[facility]). All rights reserved.      Do you have sleep apnea, excessive snoring or daytime drowsiness? : yes    Reviewed and updated as needed this visit by clinical staff   Tobacco  Allergies  Meds      "         Reviewed and updated as needed this visit by Provider                 Social History     Tobacco Use    Smoking status: Never     Passive exposure: Never    Smokeless tobacco: Never   Substance Use Topics    Alcohol use: No             11/17/2023     3:10 PM   Alcohol Use   Prescreen: >3 drinks/day or >7 drinks/week? Not Applicable     Do you have a current opioid prescription? No  Do you use any other controlled substances or medications that are not prescribed by a provider? None      Current providers sharing in care for this patient include:   Patient Care Team:  Angelica Marcano MD as PCP - General (Internal Medicine)  Angelica Marcano MD as Assigned PCP  Jasmeet John MD as Assigned Heart and Vascular Provider    The following health maintenance items are reviewed in Epic and correct as of today:  Health Maintenance   Topic Date Due    RSV VACCINE (Pregnancy & 60+) (1 - 1-dose 60+ series) Never done    TSH W/FREE T4 REFLEX  05/25/2024    MEDICARE ANNUAL WELLNESS VISIT  11/20/2024    ANNUAL REVIEW OF HM ORDERS  11/20/2024    FALL RISK ASSESSMENT  11/20/2024    COLORECTAL CANCER SCREENING  02/12/2025    DTAP/TDAP/TD IMMUNIZATION (4 - Td or Tdap) 05/31/2025    MAMMO SCREENING  11/03/2025    LIPID  11/04/2027    ADVANCE CARE PLANNING  11/20/2028    DEXA  11/15/2036    HEPATITIS C SCREENING  Completed    HIV SCREENING  Completed    PHQ-2 (once per calendar year)  Completed    INFLUENZA VACCINE  Completed    Pneumococcal Vaccine: 65+ Years  Completed    ZOSTER IMMUNIZATION  Completed    COVID-19 Vaccine  Completed    IPV IMMUNIZATION  Aged Out    HPV IMMUNIZATION  Aged Out    MENINGITIS IMMUNIZATION  Aged Out    RSV MONOCLONAL ANTIBODY  Aged Out    PAP  Discontinued     Labs reviewed in EPIC  Labs pending    FHS-7:       11/1/2021     7:36 AM 11/2/2022     7:56 AM 11/3/2023     9:01 AM   Breast CA Risk Assessment (FHS-7)   Did any of your first-degree relatives have breast or ovarian cancer? No No  "No   Did any of your relatives have bilateral breast cancer? No No No   Did any man in your family have breast cancer? No No No   Did any woman in your family have breast and ovarian cancer? No No No   Did any woman in your family have breast cancer before age 50 y? No No No   Do you have 2 or more relatives with breast and/or ovarian cancer? No No No   Do you have 2 or more relatives with breast and/or bowel cancer? No No No       Mammogram Screening: Recommended mammography every 1-2 years with patient discussion and risk factor consideration  Pertinent mammograms are reviewed under the imaging tab.    Review of Systems   Constitutional:  Negative for chills and fever.   HENT:  Negative for congestion, ear pain, hearing loss and sore throat.    Eyes:  Negative for pain and visual disturbance.   Respiratory:  Negative for cough and shortness of breath.    Cardiovascular:  Negative for chest pain, palpitations and peripheral edema.   Gastrointestinal:  Negative for abdominal pain, constipation, diarrhea, heartburn, hematochezia and nausea.   Breasts:  Negative for tenderness, breast mass and discharge.   Genitourinary:  Negative for dysuria, frequency, genital sores, hematuria, pelvic pain, urgency, vaginal bleeding and vaginal discharge.   Musculoskeletal:  Negative for arthralgias, joint swelling and myalgias.   Skin:  Negative for rash.   Neurological:  Negative for dizziness, weakness, headaches and paresthesias.   Psychiatric/Behavioral:  Negative for mood changes. The patient is not nervous/anxious.        OBJECTIVE:   /76 (BP Location: Left arm, Patient Position: Sitting, Cuff Size: Adult Regular)   Pulse 72   Temp 98.4  F (36.9  C) (Oral)   Resp 14   Ht 1.66 m (5' 5.35\")   Wt 69.4 kg (152 lb 14.4 oz)   SpO2 99%   BMI 25.17 kg/m   Estimated body mass index is 25.17 kg/m  as calculated from the following:    Height as of this encounter: 1.66 m (5' 5.35\").    Weight as of this encounter: 69.4 kg " (152 lb 14.4 oz).  Physical Exam  GENERAL APPEARANCE: healthy, alert and no distress  EYES: Eyes grossly normal to inspection, PERRL and conjunctivae and sclerae normal  HENT: ear canals and TM's normal, nose and mouth without ulcers or lesions, oropharynx clear and oral mucous membranes moist  NECK: no adenopathy,   RESP: lungs clear to auscultation - no rales, rhonchi or wheezes  BREAST: normal without masses, tenderness or nipple discharge and no palpable axillary masses or adenopathy  CV: regular rate and rhythm, normal S1 S2, no S3   ABDOMEN: soft, nontender, no hepatosplenomegaly, no masses and bowel sounds normal  MS: no musculoskeletal defects are noted and gait is age appropriate without ataxia  SKIN: no suspicious lesions or rashes   NEURO: Normal strength and tone, sensory exam grossly normal, mentation intact and speech normal  PSYCH: mentation appears normal and affect normal/bright    Diagnostic Test Results:  Labs reviewed in Epic    ASSESSMENT / PLAN:   Diane was seen today for physical.    Diagnoses and all orders for this visit:    Encounter for Medicare annual wellness exam  Preventive health counseling was also done.  Mammogram done 11/03 was  normal  DEXA done in 2021 which shows mild osteopenia. I ordered another one today which the patient will schedule.  Colonoscopy done 2020, patient due for another one in 2025.  Patient counseled on diet and exercise.  I recommend following up in 1 year.    Need for vaccination against respiratory syncytial virus  Patient will receive RSV vaccine at pharmacy.    Nonrheumatic aortic valve insufficiency  Patient followed by cardiology    Hyperlipidemia, unspecified hyperlipidemia type  -     rosuvastatin (CRESTOR) 5 MG tablet; Take 1 tablet (5 mg) by mouth daily for cholesterol  -     Lipid panel reflex to direct LDL Fasting; Future    Aortic root dilatation (H24)  Patient followed by cardiology  stable    Hypothyroidism, unspecified type  -     levothyroxine  "(SYNTHROID/LEVOTHROID) 50 MCG tablet; Take 1 tablet (50 mcg) by mouth daily  -     TSH with free T4 reflex; Future    Bicuspid aortic valve  Last echo was done on July 13, 2021  It shows bicuspid aortic valve mild to moderate aortic regurgitation ascending aorta at 3.6 cm  Next Echocardiogram in 2 years    Osteopenia with high risk of fracture  -     alendronate (FOSAMAX) 70 MG tablet; Take 1 tablet (70 mg) by mouth every 7 days  -     DX Hip/Pelvis/Spine; Future  She takes adequate calcium and vit D  Tolerating fosamax    Leukopenia, unspecified type  -     CBC with platelets; Future    Nausea  -     scopolamine (TRANSDERM) 1 MG/3DAYS 72 hr patch; PLACE ONE PATCH ONTO THE SKIN EVERY 72 HOURS. APPLY TO HAIRLESS AREA BEHIND ONE EAR AS NEEDED FOR NAUSEA. REMOVE OLD PATCH AND CHANGE EVERY 3 DAYS.    Asymptomatic postmenopausal status  -     DX Hip/Pelvis/Spine; Future    Medication management  -     Comprehensive metabolic panel; Future    Hyponatremia  -     Comprehensive metabolic panel; Future    Elevated glucose  -     Hemoglobin A1c; Future    Thrombocytopenia (H24)  Chronic and stable   Patient has history of thrombocytopenia and leukopenia  Platelets are normal  White count is low but is stable  Recheck it every 6 months  Ordered the labs to be done in 6 months    Other orders  -     REVIEW OF HEALTH MAINTENANCE PROTOCOL ORDERS  -     PRIMARY CARE FOLLOW-UP SCHEDULING; Future    Patient has been advised of split billing requirements and indicates understanding: Yes      COUNSELING:  Reviewed preventive health counseling, as reflected in patient instructions  Special attention given to:       Regular exercise       Healthy diet/nutrition       Osteoporosis prevention/bone health      BMI:   Estimated body mass index is 25.17 kg/m  as calculated from the following:    Height as of this encounter: 1.66 m (5' 5.35\").    Weight as of this encounter: 69.4 kg (152 lb 14.4 oz).   Weight management plan: Discussed " healthy diet and exercise guidelines      She reports that she has never smoked. She has never been exposed to tobacco smoke. She has never used smokeless tobacco.      Appropriate preventive services were discussed with this patient, including applicable screening as appropriate for fall prevention, nutrition, physical activity, Tobacco-use cessation, weight loss and cognition.  Checklist reviewing preventive services available has been given to the patient.    Reviewed patients plan of care and provided an AVS. The Basic Care Plan (routine screening as documented in Health Maintenance) for Diane meets the Care Plan requirement. This Care Plan has been established and reviewed with the Patient.        Angelica Marcano MD  [facility]    This document serves as a record of the services and decisions personally performed and made by Dr. Marcano. It was created on her behalf by Earnestine Garay, a trained medical scribe. The creation of this document is based the provider's statements to the medical scribe.     Identified Health Risks:  I have reviewed Opioid Use Disorder and Substance Use Disorder risk factors and made any needed referrals.

## 2023-11-20 NOTE — PATIENT INSTRUCTIONS
You are due for bone density.  Please call the following number to make appointment :  [phone]  It is located in [address]    Respiratory syncytial virus (RSV) is an important cause of lower respiratory tract disease in older adults.   In 2023, the [place] Food and Drug Administration approved two recombinant RSV vaccines for the prevention of lower respiratory tract disease in individuals 60 years of age and older [1,2].     this vaccine prevented RSV-related respiratory infection and lower respiratory tract disease in adults age 60 years and older who received one dose of an AS01E-adjuvanted RSV Prefusion F Protein Vaccine [51].     This infection may cause serious infection like pneumonia in older patients   So it is good idea to get this RSV vaccine from any local pharmacy .    Labs today        Patient Education   Personalized Prevention Plan  You are due for the preventive services outlined below.  Your care team is available to assist you in scheduling these services.  If you have already completed any of these items, please share that information with your care team to update in your medical record.  Health Maintenance Due   Topic Date Due    RSV VACCINE (Pregnancy & 60+) (1 - 1-dose 60+ series) Never done    ANNUAL REVIEW OF HM ORDERS  11/17/2023

## 2023-11-21 NOTE — RESULT ENCOUNTER NOTE
Carina Contreras    This is to inform you regarding your test result.    The testing of your blood sugar, kidney function, liver function and electrolytes was satisfactory .  HbA1c which is average glucose during last 3 months is 5.7%.  White count is low but numbers are stable  Platelets are normal  Hemoglobin is normal  TSH which is thyroid hormone is normal.  Your total cholesterol is normal.  HDL which is called good cholesterol is normal.  Your LDL cholesterol is normal.  This is often call bad cholesterol and high levels increase the risk for heart attacks and strokes.  Your triglycerides are normal.  Recheck labs in 6 months      Sincerely,      Dr.Nasima Jeet MD,FACP

## 2023-11-27 ENCOUNTER — HOSPITAL ENCOUNTER (OUTPATIENT)
Dept: BONE DENSITY | Facility: CLINIC | Age: 63
Discharge: HOME | End: 2023-11-27
Attending: INTERNAL MEDICINE | Admitting: INTERNAL MEDICINE
Payer: COMMERCIAL

## 2023-11-27 DIAGNOSIS — Z78.0 ASYMPTOMATIC POSTMENOPAUSAL STATUS: ICD-10-CM

## 2023-11-27 DIAGNOSIS — M85.80 OSTEOPENIA WITH HIGH RISK OF FRACTURE: ICD-10-CM

## 2023-11-27 PROCEDURE — 77080 DXA BONE DENSITY AXIAL: CPT

## 2023-11-28 NOTE — RESULT ENCOUNTER NOTE
Carina Contreras    This is to inform you regarding your test result.    Bone density shows osteopenia  -There has been a 1.4% increase in lumbar spine BMD.  -There has been a 2.7% decrease in bilateral hip BM  Continue adequate calcium and vit D and weight bearing exercise   Continue fosamax  As there is slight decrease in bone density despite fosamax I recommend input from endocrinologist   Referral is placed to see endocrinologist  Please call the following number to make the appointment :  call [phone].      Sincerely,      Dr.Nasima Jeet MD,FACP

## 2023-11-29 NOTE — RESULT ENCOUNTER NOTE
Carina Contreras    This is to inform you regarding your test result.    Despite numerous attempts I could not speak to you  Line got disconnected   Your bone density result were reviewed   Correction is made and it shows improvement   Thank you for letting us know  You may not need to see endocrinologist at this point   Continue present management     Sincerely,      Dr.Nasima Jeet MD,FACP

## 2023-12-13 NOTE — RESULT ENCOUNTER NOTE
Carina Contreras,    This is to inform you regarding your test result.    I spoke to you on phone but sending you a result note also.  Your white continue to be on low side   Hemoglobin is normal  It will need ongoing monitoring  I want you to see hematologist  Please call the following number to call and make the appointment :  [phone]  Ask for  or   Your total cholesterol is normal.  HDL which is called good cholesterol is normal.  Your LDL cholesterol is normal.  This is often call bad cholesterol and high levels increase the risk for heart attacks and strokes.  Your triglycerides are normal.  ALT which is liver test is fine.  I recommend ongoing monitoring every six months for CBC  But this time will recheck in 3 months.    Sincerely,      Dr.Nasima Jeet MD,FACP       13-Dec-2023 21:04

## 2023-12-19 ENCOUNTER — PATIENT OUTREACH (OUTPATIENT)
Dept: GASTROENTEROLOGY | Facility: CLINIC | Age: 63
End: 2023-12-19
Payer: COMMERCIAL

## 2024-01-08 DIAGNOSIS — I35.1 NONRHEUMATIC AORTIC VALVE INSUFFICIENCY: ICD-10-CM

## 2024-01-08 DIAGNOSIS — M85.80 OSTEOPENIA WITH HIGH RISK OF FRACTURE: ICD-10-CM

## 2024-01-09 DIAGNOSIS — M85.80 OSTEOPENIA WITH HIGH RISK OF FRACTURE: ICD-10-CM

## 2024-01-09 RX ORDER — LISINOPRIL 10 MG/1
10 TABLET ORAL DAILY
Qty: 90 TABLET | Refills: 2 | Status: SHIPPED | OUTPATIENT
Start: 2024-01-09

## 2024-01-09 RX ORDER — ALENDRONATE SODIUM 70 MG/1
70 TABLET ORAL
Qty: 4 TABLET | Refills: 0 | Status: SHIPPED | OUTPATIENT
Start: 2024-01-09 | End: 2024-01-10

## 2024-01-09 NOTE — TELEPHONE ENCOUNTER
Prescription approved per [facility] Refill Protocol.  Mehnaz Paredes RN  [facility]

## 2024-01-09 NOTE — TELEPHONE ENCOUNTER
Temporary refill request: Fosamax 4 tabs. Pt has had issues with transferring insurance and pharmacies.   Pt will pay cash. Pt discussed alternative options and verbalized that paying cash for 4 tablets would be less than ten dollars, prefers to do this.     Please see note to pharmacy in refill request.     Routing to refill pool as high priority; pt out of med.

## 2024-01-10 RX ORDER — ALENDRONATE SODIUM 70 MG/1
70 TABLET ORAL
Qty: 4 TABLET | Refills: 0 | Status: SHIPPED | OUTPATIENT
Start: 2024-01-10 | End: 2024-09-04

## 2024-01-10 NOTE — TELEPHONE ENCOUNTER
Patient wanted to  medication at local pharmacy due to price.     Will re-order.     Inga Godoy RN  [facility]

## 2024-01-10 NOTE — TELEPHONE ENCOUNTER
Patient called the clinic, informed both Rx's were approved yesterday and sent electronically to the pharmacy.

## 2024-05-15 ENCOUNTER — OFFICE VISIT (OUTPATIENT)
Dept: ENDOCRINOLOGY | Facility: CLINIC | Age: 64
End: 2024-05-15
Payer: COMMERCIAL

## 2024-05-15 VITALS
TEMPERATURE: 97.8 F | RESPIRATION RATE: 16 BRPM | SYSTOLIC BLOOD PRESSURE: 105 MMHG | DIASTOLIC BLOOD PRESSURE: 60 MMHG | BODY MASS INDEX: 25.27 KG/M2 | HEIGHT: 65 IN | OXYGEN SATURATION: 98 % | WEIGHT: 151.7 LBS | HEART RATE: 70 BPM

## 2024-05-15 DIAGNOSIS — M85.80 OSTEOPENIA WITH HIGH RISK OF FRACTURE: ICD-10-CM

## 2024-05-15 PROCEDURE — 99204 OFFICE O/P NEW MOD 45 MIN: CPT | Performed by: INTERNAL MEDICINE

## 2024-05-15 NOTE — PROGRESS NOTES
Name: Diane Ritter  Date: 5/15/2024  Seen in consultation with Angelica Marcano for : Osteopenia.     HPI:  Diane Ritter is a 63 year old female who presents for the evaluation of : Osteopenia    has a past medical history of Aortic valve disorder-2-3 central aortic regugitation (3/26/2015), Cancer (H) (1991), BHUMIKA (obstructive sleep apnea) (3/26/2015), Thyroid nodule, and Venous insufficiency.    DEXA 2021: Osteopenia  Diagnosed with osteopenia in 2021 based on DEXA.  Started Fosmsmax in 2021    DEXA 11/2023: Low bone density (OSTEOPENIA).   INTERVAL CHANGE:  -There has been a 1.4% increase in lumbar spine BMD.   -There has been a 2.7% increase in bilateral hip BMD.  ( See addendum to report. Previously falsely noted to be decreasing BMD in spine)    Currently taking Fosamax 70 mg once a week ( X 11/2021-- she is tolerating it okay.  No major side effects)      GERD: No    Dental health: OK. No major upcoming dental procedure.  Has regular follow-up with dentistry.     Kidney function: within normal limits.     Previous treatment for osteopenia/osteoporosis: Fosamax    Current treatment for Osteopenia/Osteoporosis: Fosamax    Smoke:no  Family History:Yes: mother with osteoporosis  Menstrual history/Birthing: s/p menopause  HRT after menopause: for short time  Fractures: metatarsal fracture after  trauma to hand.  Kidney stones: No  GI Surgery:No  Duration of therapy:  as noted above  Exercise:Is active, biking, swimming and walking  Diet:  2 servings of dairy/day + greens  Ca/Vitamin D: no calcium supplement. takes 35 mcg of vit D/day  Alcohol:  No  Eating Disorder: No  Steroid Use:  No  PMH/PSH:  Past Medical History:   Diagnosis Date    Aortic valve disorder-2-3 central aortic regugitation 3/26/2015    Cancer (H) 1991    Squamous cell cancer of cervix    BHUMIKA (obstructive sleep apnea) 3/26/2015    Thyroid nodule     Venous insufficiency      Past Surgical History:   Procedure Laterality Date     APPENDECTOMY      BIOPSY      Cervix    COLONOSCOPY      GYN SURGERY      ovarian cysts removed     HYSTERECTOMY      HYSTERECTOMY VAGINAL      cervical cancer    ORTHOPEDIC SURGERY  2010    hand surgery on right side     Family Hx:  Family History   Problem Relation Age of Onset    Heart Disease Mother         mitral valve disorder??    Rheumatoid Arthritis Mother     Osteoporosis Mother     Other - See Comments Father         abdominal aortic aneurysm    Hypertension Father     Colon Polyps Father     Other Cancer Father          of pancreatic Ca w/ mets to liver    Diabetes Brother     Pancreatic Cancer Brother     Colon Cancer Brother     Diabetes Brother     Hypertension Brother     Hyperlipidemia Brother     Thyroid Disease Sister         Hashimotos Thyroiditis    Breast Cancer No family hx of               Social Hx:  Social History     Socioeconomic History    Marital status:      Spouse name: Not on file    Number of children: Not on file    Years of education: Not on file    Highest education level: Not on file   Occupational History    Not on file   Tobacco Use    Smoking status: Never     Passive exposure: Never    Smokeless tobacco: Never   Vaping Use    Vaping status: Never Used   Substance and Sexual Activity    Alcohol use: No    Drug use: No    Sexual activity: Yes     Partners: Male   Other Topics Concern     Service Not Asked    Blood Transfusions Not Asked    Caffeine Concern No    Occupational Exposure Not Asked    Hobby Hazards Not Asked    Sleep Concern Yes     Comment: sleep apnea, wears oral device    Stress Concern No    Weight Concern Yes     Comment: weight loss 15# over last year    Special Diet Yes     Comment: low sugar, no processed foods, juicing    Back Care Not Asked    Exercise Yes     Comment: 3 days week, biking, elliptical    Bike Helmet Not Asked    Seat Belt Not Asked    Self-Exams Not Asked    Parent/sibling w/ CABG, MI or angioplasty before 65F 55M?  No   Social History Narrative    Not on file     Social Determinants of Health     Financial Resource Strain: Low Risk  (11/17/2023)    Financial Resource Strain     Within the past 12 months, have you or your family members you live with been unable to get utilities (heat, electricity) when it was really needed?: No   Food Insecurity: Low Risk  (11/17/2023)    Food Insecurity     Within the past 12 months, did you worry that your food would run out before you got money to buy more?: No     Within the past 12 months, did the food you bought just not last and you didn t have money to get more?: No   Transportation Needs: Low Risk  (11/17/2023)    Transportation Needs     Within the past 12 months, has lack of transportation kept you from medical appointments, getting your medicines, non-medical meetings or appointments, work, or from getting things that you need?: No   Physical Activity: Not on file   Stress: Not on file   Social Connections: Not on file   Interpersonal Safety: Low Risk  (11/20/2023)    Interpersonal Safety     Do you feel physically and emotionally safe where you currently live?: Yes     Within the past 12 months, have you been hit, slapped, kicked or otherwise physically hurt by someone?: No     Within the past 12 months, have you been humiliated or emotionally abused in other ways by your partner or ex-partner?: No   Housing Stability: Low Risk  (11/17/2023)    Housing Stability     Do you have housing? : Yes     Are you worried about losing your housing?: No          MEDICATIONS:  has a current medication list which includes the following prescription(s): alendronate, ascorbic acid, vitamin d, epinephrine, ferrous sulfate, fish oil, levothyroxine, lisinopril, rosuvastatin, and scopolamine.    ROS     ROS: 10 point ROS neg other than the symptoms noted above in the HPI.    Physical Exam   VS: /60 (BP Location: Left arm, Patient Position: Chair, Cuff Size: Adult Regular)   Pulse 70   Temp  "97.8  F (36.6  C) (Tympanic)   Resp 16   Ht 1.66 m (5' 5.35\")   Wt 68.8 kg (151 lb 11.2 oz)   LMP  (LMP Unknown)   SpO2 98%   Breastfeeding No   BMI 24.97 kg/m    GENERAL: healthy, alert and no distress  EYES: Eyes grossly normal to inspection, conjunctivae and sclerae normal  ENT: no nose swelling, nasal discharge.  Thyroid: no apparent thyroid nodules.    CV: RRR, no rubs, gallops, no murmurs  RESP: CTAB, no wheezes, rales, or ronchi  ABDO: +BS  EXTREMITIES: no hand tremors.  NEURO: Cranial nerves grossly intact, mentation intact and speech normal  SPINE: Midline, no TTP.  SKIN: No apparent skin lesions, rash or edema seen   PSYCH: mentation appears normal, affect normal/bright, judgement and insight intact, normal speech and appearance well-groomed    LABS:  Calcium:   Latest Ref Rng 11/20/2023  10:10 AM   END[facility] CALCIUM [facility]     Calcium 8.8 - 10.2 mg/dL 10.0        Creatinine:  Creatinine   Date Value Ref Range Status   11/20/2023 0.80 0.51 - 0.95 mg/dL Final   10/29/2020 0.80 0.52 - 1.04 mg/dL Final       TFTs:  Lab Results   Component Value Date    TSH 1.72 11/20/2023    TSH 1.94 11/04/2022    TSH 1.63 04/29/2021       PTH:    Vitamin D:  Vitamin D Deficiency Screening Results:  No results found for: \"VITDT\"      DEXA:  DEXA 11/2023: Low bone density (OSTEOPENIA).   INTERVAL CHANGE:  -There has been a 1.4% increase in lumbar spine BMD.   -There has been a 2.7% increase in bilateral hip BMD.    All pertinent notes, labs, and images personally reviewed by me.     A/P  Ms.Anne NICK Ritter is a 63 year old here for the evaluation of:    Osteopenia:  Comment: History of osteopenia and is on Fosamax since 2021.  She is tolerating it okay.  Follow-up DEXA 11/2022 as noted above showing stable/slight improvement in bone density in spine and hip  Plan:   Discussed diagnosis, pathophysiology, management and treatment options of condition with pt.  Discussed osteopenia/osteoporosis in general.  In the setting " of stable bone density recommend to continue Fosamax.  She has questions about medication and Fosamax in general which were addressed to the best of my knowledge.  Continue Fosamax 70 mg/week  DEXA in 11/2025 through PCP  Follow up with endocrinology as needed      Risk factors for low bone density include personal history of fracture or family history, , advanced age, female, dementia, and poor health.  Also smoking, low BMI, Estrogen deficiency, low Ca intake, and alcoholism.  A prior history of vertebral fracture greatly increases the risk of subsequent fractures. A history of other medical diseases impacting on bone may also affect bone health.      The pt was advised to  Maintain an adequate calcium and vitamin D intake and to supplement vitamin D if needed to maintain serum levels of 25 hydroxy D (25 OH D) between 30-60 ng/ml.  Limit alcohol intake to no more than 2 servings per day.  Limit caffeine intake.  Maintain an active lifestyle including weight-bearing exercises for at least 30 mins daily.  Take measures to reduce the risk of falling.   Discussed indications, risks and benefits of all medications prescribed, and answered questions to patient's satisfaction.     All questions were answered.  The patient indicates understanding of the above issues and agrees with the plan set forth.      Follow-up:  As noted in AVS    Sloane Cuenca MD  Endocrinology  [facility]  CC: Angelica Marcano

## 2024-05-15 NOTE — PATIENT INSTRUCTIONS
[facility]  Dr Cuenca, Endocrinology Department    [place] [address] [id number]  [place] [address]  Appointment [phone]  Fax: [phone]  [place]: Monday - Thursday      Please check the cost coverage and copay with insurance before recommended tests, services and medications (especially if new medications are prescribed).     If ordered, please get blood work done 1 week prior to your next appointment so they will be available to Dr. Cuenca at your visit.    Continue Fosamax 70 mg/week  DEXA in 2025 through PCP  Follow up with endocrinology as needed    Instructions on Fosamax use and side effects - particularly esophageal adverse events - are carefully reviewed with her. This drug must be taken upon arising for the day on an empty stomach, with a large 6-8 ounce glass of water; she must remain NPO in the upright position for at least 30 minutes afterwards and until after the first food of the day. If esophageal irritation is noted, she will stop the drug and call my office.  Treatment with bisphosphonate therapy will decrease fracture risk 50-70%.   There is risk of osteonecrosis of the jaw in patients using bisphosphonates is approximately 1700-1/100,000, with development most likely related to invasive dental procedures. If an invasive dental procedure was necessary, preferably stop the bisphosphonate approximately 3 months prior to reduce the risk. Let your dentist know that you are on this medication.  The data available at this time suggests that there is probably a small increase risk of atypical (nontraumatic) subtrochanteric fractures of the femur in patients on bisphosphonate therapy compared to those not on it. One large study suggested that for every 100 fractures prevented with bisphosphonate therapy, less than one femur fracture will occur. Other studies suggest one episode per 2,500 patient years. Patient should call  with leg pain.      The pt was advised to  Maintain an adequate calcium and vitamin D intake and to supplement vitamin D if needed to maintain serum levels of 25 hydroxy D (25 OH D) between 30-60 ng/ml.  Limit alcohol intake to no more than 2 servings per day.  Limit caffeine intake.  Maintain an active lifestyle including weight-bearing exercises for at least 30 mins daily.  Take measures to reduce the risk of falling.     You should get 1000- 1200 mg/day calcium in divided doses of no more than 500 mg/dose.  This INCLUDES what is in your food as well as what is in any supplements you may be taking.    Vit D about 800-1000 IU/day ( unless you have vit D deficiency- in that case higher dose)  Dietary sources of calcium:: These also contain vitamin D  Milk                            8 oz            300 mg calcium  Yogurt                          1 cup           400 mg calcium   Hard cheese                     1.5 oz          300 mg  Cottage cheese                  2 cup           300 mg  Orange juice with Calcium       8 oz            300 mg  Low fat dairy sources are recommended        You should get 30 minutes of moderate weight bearing exercise on most days of the week .  Weight bearing exercise includes such things as walking, jogging, hiking, dancing.  You should also get Strength training 2 or more times/week in addition to other weight -being exercise. Strength training uses weight or resistance beyond that seen in everyday activities -(pilates, weight training with free weights, weight machines or resistance bands)     Living with Osteoporosis: Preventing Fractures  If you have osteoporosis, you can do a lot to reduce its effect on your life. Knowing how to prevent fractures and spinal curvature can help you live more comfortably and safely with this disease.  Reducing your risk for fractures  The most common fracture sites in people with osteoporosis are the wrist, spine, and hip. These fractures are often  caused by accidents and falls. All fractures are painful and may limit what you can do. But hip fractures are very serious. They often need surgery, and it can take months to recover. To reduce your risk for fractures:  Get regular exercise. Try walking, swimming, or weight training.  Eat foods that are rich in calcium, or take calcium supplements.  Make your home safe to help avoid accidents.  Take extra precautions not to fall in risky areas, such as icy sidewalks.  Understanding spinal fractures  Your spine is made up of many bones called vertebrae. Osteoporosis can cause the vertebrae in your spine to collapse. As a result, your upper back may arch forward, creating a curvature. Spine fractures may also result from back strain and bad posture. You will also lose height. Your lower spine must then adjust to keep your body balanced. This can cause back pain. To prevent or lessen these spinal changes:  Practice good posture.  Use proper techniques if you need to lift heavy objects.  Do back exercises to help your posture.  Lie on your back when you have pain.  Ask your healthcare provider about these and other ways to help your spine.  [employer] last reviewed this educational content on 5/1/2018 2000-2021 [employer]. All rights reserved. This information is not intended as a substitute for professional medical care. Always follow your healthcare professional's instructions.          Living with Osteoporosis: Regular Exercise  If you have osteoporosis, exercise is vital for your health. It can prevent bone fractures and spine changes. It will slow bone loss. Exercise will strengthen your body. It can also be fun. A variety of exercises is best. See below for exercises that can help you. But before you start, talk with your healthcare provider to be sure these exercises are right for you.   Resistance exercises. These build muscle strength and maintain bone mass. They also make you less prone to  injury. Exercises include lifting small weights, doing push-ups and sit-ups, using elastic exercise bands, and using weight machines.   Weight-bearing activities. These help your whole body. They also help you maintain bone mass. Activities include walking, dancing, and housework.   Non-weight-bearing exercises. These help prevent back strain and pain. They do this by building the trunk and leg muscles. Exercises that help with flexibility can prevent falls. Examples include swimming, water exercise, and stretching.   Staying safe  Here are tips to stay safe:   Always check with your healthcare provider before starting any new exercise program.  Use weights only as instructed.  Stop any exercise that causes pain.  [employer] last reviewed this educational content on 5/1/2018 2000-2021 [employer]. All rights reserved. This information is not intended as a substitute for professional medical care. Always follow your healthcare professional's instructions.          Preventing Osteoporosis: Avoiding Bone Loss  Certain factors can speed up bone loss or decrease bone growth. For example, alcohol, cigarettes, and certain medicines reduce bone mass. Some foods make it hard for your body to absorb calcium.  Things to avoid  Here are things to avoid to help prevent osteoporosis:  Alcohol. This is toxic to bones. It is a major cause of bone loss. Heavy drinking can cause osteoporosis even if you have no other risk factors.  Smoking. This reduces bone mass. Smoking may also interfere with estrogen levels and cause early menopause.  Inactivity. Not being active makes your bones lose strength and become thinner. Over time, thin bones may break. Women who aren't active are at a high risk for osteoporosis.  Certain medicines. Some medicines, such as cortisone, increase bone loss. They also decrease bone growth. Ask your healthcare provider about any side effects of your medicines, and how to prevent  them.  Protein-rich or salty foods. Eaten in large amounts, these foods may deplete calcium.  Caffeine. This increases calcium loss. People who drink a lot of coffee, tea, or soda lose more calcium than those who don't.  [employer]â„¢ last reviewed this educational content on 5/1/2018 2000-2021 [employer]. All rights reserved. This information is not intended as a substitute for professional medical care. Always follow your healthcare professional's instructions.          Preventing Osteoporosis: Meeting Your Calcium Needs  Your body needs calcium to build and repair bones. But it can't make calcium on its own. That's why it's important to eat calcium-rich foods. Some foods are naturally rich in calcium. Others have calcium added (fortified). It's best to get calcium from the foods you eat. But if you can't get enough, you may want to take calcium supplements. To meet your daily calcium needs, try the foods listed below.                          Dairy Fish & beans Other sources   Source   Calcium (mg) per serving   Source   Calcium (mg) per serving   Source   Calcium (mg) per serving   Low-fat yogurt, plain   415 mg/8 oz.   Sardines, [place], canned, with bones   351 mg/3 oz.   Oatmeal, instant, fortified   215 mg/1 cup   Nonfat milk   302 mg/1 cup   [place], sockeye, canned, with bones   239 mg/3 oz.   Tofu made with calcium sulfate   204 mg/3 oz.   Low-fat milk   297 mg/1 cup   Soybeans, fresh, boiled   131 mg/1/2 cup   Collards   179 mg/1/2 cup   Swiss cheese   272 mg/1 oz.   White beans, cooked   81 mg/1/2 cup   English muffin, whole wheat   175 mg/1 muffin   Cheddar cheese   205 mg/1 oz.   Navy beans, cooked   79 mg/1/2 cup   Kale   90 mg/1/2 cup   Ice cream strawberry   79 mg/1/2 cup           Orange, navel   56 mg/1 medium   Note: Calcium levels may vary depending on brand and size.  Daily calcium needs  14 to 18 years old: 1,300 mg  19 to 30 years old: 1,000 mg  31 to 50 years old: 1,000 mg  51  to 70 years old, women: 1,200 mg  51 to 70 years old, men: 1,000 mg  Pregnant or nursin to 18 years old: 1,300 mg, 19 to 50 years old: 1,000 mg  Older than 70 (women and men): 1,200 mg   [employer] last reviewed this educational content on 2018-2021 [employer]. All rights reserved. This information is not intended as a substitute for professional medical care. Always follow your healthcare professional's instructions.

## 2024-05-15 NOTE — LETTER
5/15/2024         RE: Diane Ritter  [address] [place] [address]        Dear Colleague,    Thank you for referring your patient, Diane Ritter, to the [facility]. Please see a copy of my visit note below.    Name: Diane Ritter  Date: 5/15/2024  Seen in consultation with Angelica Marcano for : Osteopenia.     HPI:  Diane Ritter is a 63 year old female who presents for the evaluation of : Osteopenia    has a past medical history of Aortic valve disorder-2-3 central aortic regugitation (3/26/2015), Cancer (H) (1991), BHUMIKA (obstructive sleep apnea) (3/26/2015), Thyroid nodule, and Venous insufficiency.    DEXA 2021: Osteopenia  Diagnosed with osteopenia in 2021 based on DEXA.  Started Fosmsmax in 2021    DEXA 11/2023: Low bone density (OSTEOPENIA).   INTERVAL CHANGE:  -There has been a 1.4% increase in lumbar spine BMD.   -There has been a 2.7% increase in bilateral hip BMD.  ( See addendum to report. Previously falsely noted to be decreasing BMD in spine)    Currently taking Fosamax 70 mg once a week ( X 11/2021-- she is tolerating it okay.  No major side effects)      GERD: No    Dental health: OK. No major upcoming dental procedure.  Has regular follow-up with dentistry.     Kidney function: within normal limits.     Previous treatment for osteopenia/osteoporosis: Fosamax    Current treatment for Osteopenia/Osteoporosis: Fosamax    Smoke:no  Family History:Yes: mother with osteoporosis  Menstrual history/Birthing: s/p menopause  HRT after menopause: for short time  Fractures: metatarsal fracture after  trauma to hand.  Kidney stones: No  GI Surgery:No  Duration of therapy:  as noted above  Exercise:Is active, biking, swimming and walking  Diet:  2 servings of dairy/day + greens  Ca/Vitamin D: no calcium supplement. takes 35 mcg of vit D/day  Alcohol:  No  Eating Disorder: No  Steroid Use:  No  PMH/PSH:  Past Medical History:   Diagnosis Date     Aortic valve  disorder-2-3 central aortic regugitation 3/26/2015     Cancer (H)     Squamous cell cancer of cervix     BHUMIKA (obstructive sleep apnea) 3/26/2015     Thyroid nodule      Venous insufficiency      Past Surgical History:   Procedure Laterality Date     APPENDECTOMY       BIOPSY      Cervix     COLONOSCOPY       GYN SURGERY      ovarian cysts removed      HYSTERECTOMY       HYSTERECTOMY VAGINAL      cervical cancer     ORTHOPEDIC SURGERY  2010    hand surgery on right side     Family Hx:  Family History   Problem Relation Age of Onset     Heart Disease Mother         mitral valve disorder??     Rheumatoid Arthritis Mother      Osteoporosis Mother      Other - See Comments Father         abdominal aortic aneurysm     Hypertension Father      Colon Polyps Father      Other Cancer Father          of pancreatic Ca w/ mets to liver     Diabetes Brother      Pancreatic Cancer Brother      Colon Cancer Brother      Diabetes Brother      Hypertension Brother      Hyperlipidemia Brother      Thyroid Disease Sister         Hashimotos Thyroiditis     Breast Cancer No family hx of               Social Hx:  Social History     Socioeconomic History     Marital status:      Spouse name: Not on file     Number of children: Not on file     Years of education: Not on file     Highest education level: Not on file   Occupational History     Not on file   Tobacco Use     Smoking status: Never     Passive exposure: Never     Smokeless tobacco: Never   Vaping Use     Vaping status: Never Used   Substance and Sexual Activity     Alcohol use: No     Drug use: No     Sexual activity: Yes     Partners: Male   Other Topics Concern      Service Not Asked     Blood Transfusions Not Asked     Caffeine Concern No     Occupational Exposure Not Asked     Hobby Hazards Not Asked     Sleep Concern Yes     Comment: sleep apnea, wears oral device     Stress Concern No     Weight Concern Yes     Comment: weight loss 15# over last  year     Special Diet Yes     Comment: low sugar, no processed foods, juicing     Back Care Not Asked     Exercise Yes     Comment: 3 days week, biking, elliptical     Bike Helmet Not Asked     Seat Belt Not Asked     Self-Exams Not Asked     Parent/sibling w/ CABG, MI or angioplasty before 65F 55M? No   Social History Narrative     Not on file     Social Determinants of Health     Financial Resource Strain: Low Risk  (11/17/2023)    Financial Resource Strain      Within the past 12 months, have you or your family members you live with been unable to get utilities (heat, electricity) when it was really needed?: No   Food Insecurity: Low Risk  (11/17/2023)    Food Insecurity      Within the past 12 months, did you worry that your food would run out before you got money to buy more?: No      Within the past 12 months, did the food you bought just not last and you didn t have money to get more?: No   Transportation Needs: Low Risk  (11/17/2023)    Transportation Needs      Within the past 12 months, has lack of transportation kept you from medical appointments, getting your medicines, non-medical meetings or appointments, work, or from getting things that you need?: No   Physical Activity: Not on file   Stress: Not on file   Social Connections: Not on file   Interpersonal Safety: Low Risk  (11/20/2023)    Interpersonal Safety      Do you feel physically and emotionally safe where you currently live?: Yes      Within the past 12 months, have you been hit, slapped, kicked or otherwise physically hurt by someone?: No      Within the past 12 months, have you been humiliated or emotionally abused in other ways by your partner or ex-partner?: No   Housing Stability: Low Risk  (11/17/2023)    Housing Stability      Do you have housing? : Yes      Are you worried about losing your housing?: No          MEDICATIONS:  has a current medication list which includes the following prescription(s): alendronate, ascorbic acid, vitamin  "d, epinephrine, ferrous sulfate, fish oil, levothyroxine, lisinopril, rosuvastatin, and scopolamine.    ROS     ROS: 10 point ROS neg other than the symptoms noted above in the HPI.    Physical Exam   VS: /60 (BP Location: Left arm, Patient Position: Chair, Cuff Size: Adult Regular)   Pulse 70   Temp 97.8  F (36.6  C) (Tympanic)   Resp 16   Ht 1.66 m (5' 5.35\")   Wt 68.8 kg (151 lb 11.2 oz)   LMP  (LMP Unknown)   SpO2 98%   Breastfeeding No   BMI 24.97 kg/m    GENERAL: healthy, alert and no distress  EYES: Eyes grossly normal to inspection, conjunctivae and sclerae normal  ENT: no nose swelling, nasal discharge.  Thyroid: no apparent thyroid nodules.    CV: RRR, no rubs, gallops, no murmurs  RESP: CTAB, no wheezes, rales, or ronchi  ABDO: +BS  EXTREMITIES: no hand tremors.  NEURO: Cranial nerves grossly intact, mentation intact and speech normal  SPINE: Midline, no TTP.  SKIN: No apparent skin lesions, rash or edema seen   PSYCH: mentation appears normal, affect normal/bright, judgement and insight intact, normal speech and appearance well-groomed    LABS:  Calcium:   Latest Ref Rng 11/20/2023  10:10 AM   END[facility] CALCIUM LABS[facility]     Calcium 8.8 - 10.2 mg/dL 10.0        Creatinine:  Creatinine   Date Value Ref Range Status   11/20/2023 0.80 0.51 - 0.95 mg/dL Final   10/29/2020 0.80 0.52 - 1.04 mg/dL Final       TFTs:  Lab Results   Component Value Date    TSH 1.72 11/20/2023    TSH 1.94 11/04/2022    TSH 1.63 04/29/2021       PTH:    Vitamin D:  Vitamin D Deficiency Screening Results:  No results found for: \"VITDT\"      DEXA:  DEXA 11/2023: Low bone density (OSTEOPENIA).   INTERVAL CHANGE:  -There has been a 1.4% increase in lumbar spine BMD.   -There has been a 2.7% increase in bilateral hip BMD.    All pertinent notes, labs, and images personally reviewed by me.     A/P  Ms.Anne NICK Ritter is a 63 year old here for the evaluation of:    Osteopenia:  Comment: History of osteopenia and is on Fosamax " since 2021.  She is tolerating it okay.  Follow-up DEXA 11/2022 as noted above showing stable/slight improvement in bone density in spine and hip  Plan:   Discussed diagnosis, pathophysiology, management and treatment options of condition with pt.  Discussed osteopenia/osteoporosis in general.  In the setting of stable bone density recommend to continue Fosamax.  She has questions about medication and Fosamax in general which were addressed to the best of my knowledge.  Continue Fosamax 70 mg/week  DEXA in 11/2025 through PCP  Follow up with endocrinology as needed      Risk factors for low bone density include personal history of fracture or family history, , advanced age, female, dementia, and poor health.  Also smoking, low BMI, Estrogen deficiency, low Ca intake, and alcoholism.  A prior history of vertebral fracture greatly increases the risk of subsequent fractures. A history of other medical diseases impacting on bone may also affect bone health.      The pt was advised to  Maintain an adequate calcium and vitamin D intake and to supplement vitamin D if needed to maintain serum levels of 25 hydroxy D (25 OH D) between 30-60 ng/ml.  Limit alcohol intake to no more than 2 servings per day.  Limit caffeine intake.  Maintain an active lifestyle including weight-bearing exercises for at least 30 mins daily.  Take measures to reduce the risk of falling.   Discussed indications, risks and benefits of all medications prescribed, and answered questions to patient's satisfaction.     All questions were answered.  The patient indicates understanding of the above issues and agrees with the plan set forth.      Follow-up:  As noted in AVS    Sloane Cuenca MD  Endocrinology  [facility]  CC: Angelica Marcano       Again, thank you for allowing me to participate in the care of your patient.        Sincerely,        Sloane Cuenca MD

## 2024-05-20 ENCOUNTER — LAB (OUTPATIENT)
Dept: LAB | Facility: CLINIC | Age: 64
End: 2024-05-20
Payer: COMMERCIAL

## 2024-05-20 DIAGNOSIS — D72.819 LEUKOPENIA, UNSPECIFIED TYPE: Primary | ICD-10-CM

## 2024-05-20 DIAGNOSIS — R73.03 PREDIABETES: ICD-10-CM

## 2024-05-20 DIAGNOSIS — D72.819 LEUKOPENIA, UNSPECIFIED TYPE: ICD-10-CM

## 2024-05-20 LAB
BASOPHILS # BLD AUTO: 0 10E3/UL (ref 0–0.2)
BASOPHILS NFR BLD AUTO: 1 %
EOSINOPHIL # BLD AUTO: 0 10E3/UL (ref 0–0.7)
EOSINOPHIL NFR BLD AUTO: 1 %
ERYTHROCYTE [DISTWIDTH] IN BLOOD BY AUTOMATED COUNT: 12.4 % (ref 10–15)
HBA1C MFR BLD: 5.6 % (ref 0–5.6)
HCT VFR BLD AUTO: 36.8 % (ref 35–47)
HGB BLD-MCNC: 11.9 G/DL (ref 11.7–15.7)
IMM GRANULOCYTES # BLD: 0 10E3/UL
IMM GRANULOCYTES NFR BLD: 0 %
LYMPHOCYTES # BLD AUTO: 0.8 10E3/UL (ref 0.8–5.3)
LYMPHOCYTES NFR BLD AUTO: 21 %
MCH RBC QN AUTO: 30.6 PG (ref 26.5–33)
MCHC RBC AUTO-ENTMCNC: 32.3 G/DL (ref 31.5–36.5)
MCV RBC AUTO: 95 FL (ref 78–100)
MONOCYTES # BLD AUTO: 0.3 10E3/UL (ref 0–1.3)
MONOCYTES NFR BLD AUTO: 8 %
NEUTROPHILS # BLD AUTO: 2.7 10E3/UL (ref 1.6–8.3)
NEUTROPHILS NFR BLD AUTO: 69 %
PLATELET # BLD AUTO: 218 10E3/UL (ref 150–450)
RBC # BLD AUTO: 3.89 10E6/UL (ref 3.8–5.2)
WBC # BLD AUTO: 4 10E3/UL (ref 4–11)

## 2024-05-20 PROCEDURE — 85025 COMPLETE CBC W/AUTO DIFF WBC: CPT

## 2024-05-20 PROCEDURE — 36415 COLL VENOUS BLD VENIPUNCTURE: CPT

## 2024-05-20 PROCEDURE — 83036 HEMOGLOBIN GLYCOSYLATED A1C: CPT

## 2024-05-20 NOTE — RESULT ENCOUNTER NOTE
Carina Contreras    This is to inform you regarding your test result.    CBC result which includes white count Hemoglobin and  Platelet Counts is normal.   HbA1c which is average glucose during last 3 months is normal.      Sincerely,      Dr.Nasima Jeet MD,FACP

## 2024-06-17 PROBLEM — Z71.89 ADVANCED DIRECTIVES, COUNSELING/DISCUSSION: Status: RESOLVED | Noted: 2019-10-22 | Resolved: 2024-06-17

## 2024-07-02 ENCOUNTER — TRANSFERRED RECORDS (OUTPATIENT)
Dept: HEALTH INFORMATION MANAGEMENT | Facility: CLINIC | Age: 64
End: 2024-07-02
Payer: COMMERCIAL

## 2024-11-04 ENCOUNTER — HOSPITAL ENCOUNTER (OUTPATIENT)
Dept: MAMMOGRAPHY | Facility: CLINIC | Age: 64
Discharge: HOME | End: 2024-11-04
Attending: INTERNAL MEDICINE | Admitting: INTERNAL MEDICINE
Payer: COMMERCIAL

## 2024-11-04 DIAGNOSIS — Z12.31 VISIT FOR SCREENING MAMMOGRAM: ICD-10-CM

## 2024-11-04 PROCEDURE — 77063 BREAST TOMOSYNTHESIS BI: CPT

## 2024-11-11 ENCOUNTER — PATIENT OUTREACH (OUTPATIENT)
Dept: GASTROENTEROLOGY | Facility: CLINIC | Age: 64
End: 2024-11-11
Payer: COMMERCIAL

## 2024-11-11 DIAGNOSIS — Z12.11 SPECIAL SCREENING FOR MALIGNANT NEOPLASMS, COLON: Primary | ICD-10-CM

## 2024-11-11 NOTE — PROGRESS NOTES
"[facility] Screening Colonoscopy Referral Review    Patient meets the inclusion criteria for screening colonoscopy standing order.    Ordering/Referring Provider:  Angelica Marcano      BMI: Estimated body mass index is 24.97 kg/m  as calculated from the following:    Height as of 5/15/24: 1.66 m (5' 5.35\").    Weight as of 5/15/24: 68.8 kg (151 lb 11.2 oz).     Sedation:  Does patient have any of the following conditions affecting sedation?  No medical conditions affecting sedation.    Previous Scopes:  Any previous recommendations or follow up needs based on previous scope?  na / No recommendations.    Medical Concerns to Postpone Order:  Does patient have any of the following medical concerns that should postpone/delay colonoscopy referral?  No medical conditions affecting colonoscopy referral.    Final Referral Details:  Based on patient's medical history patient is appropriate for referral order with moderate sedation. If patient's BMI > 50 do not schedule in [facility].  "

## 2024-11-20 SDOH — HEALTH STABILITY: PHYSICAL HEALTH: ON AVERAGE, HOW MANY MINUTES DO YOU ENGAGE IN EXERCISE AT THIS LEVEL?: 50 MIN

## 2024-11-20 SDOH — HEALTH STABILITY: PHYSICAL HEALTH: ON AVERAGE, HOW MANY DAYS PER WEEK DO YOU ENGAGE IN MODERATE TO STRENUOUS EXERCISE (LIKE A BRISK WALK)?: 6 DAYS

## 2024-11-20 ASSESSMENT — SOCIAL DETERMINANTS OF HEALTH (SDOH): HOW OFTEN DO YOU GET TOGETHER WITH FRIENDS OR RELATIVES?: TWICE A WEEK

## 2024-11-25 ENCOUNTER — OFFICE VISIT (OUTPATIENT)
Dept: FAMILY MEDICINE | Facility: CLINIC | Age: 64
End: 2024-11-25
Payer: COMMERCIAL

## 2024-11-25 VITALS
SYSTOLIC BLOOD PRESSURE: 111 MMHG | HEART RATE: 64 BPM | BODY MASS INDEX: 25.74 KG/M2 | HEIGHT: 65 IN | RESPIRATION RATE: 16 BRPM | TEMPERATURE: 97.4 F | WEIGHT: 154.5 LBS | OXYGEN SATURATION: 100 % | DIASTOLIC BLOOD PRESSURE: 66 MMHG

## 2024-11-25 DIAGNOSIS — D72.819 LEUKOPENIA, UNSPECIFIED TYPE: ICD-10-CM

## 2024-11-25 DIAGNOSIS — E78.5 HYPERLIPIDEMIA, UNSPECIFIED HYPERLIPIDEMIA TYPE: ICD-10-CM

## 2024-11-25 DIAGNOSIS — M85.80 OSTEOPENIA WITH HIGH RISK OF FRACTURE: ICD-10-CM

## 2024-11-25 DIAGNOSIS — D69.6 THROMBOCYTOPENIA (H): ICD-10-CM

## 2024-11-25 DIAGNOSIS — R73.09 ELEVATED GLUCOSE: ICD-10-CM

## 2024-11-25 DIAGNOSIS — Z85.41 HISTORY OF CERVICAL CANCER: ICD-10-CM

## 2024-11-25 DIAGNOSIS — Z00.00 ENCOUNTER FOR MEDICARE ANNUAL WELLNESS EXAM: Primary | ICD-10-CM

## 2024-11-25 DIAGNOSIS — E03.9 HYPOTHYROIDISM, UNSPECIFIED TYPE: ICD-10-CM

## 2024-11-25 DIAGNOSIS — I35.1 NONRHEUMATIC AORTIC VALVE INSUFFICIENCY: ICD-10-CM

## 2024-11-25 DIAGNOSIS — Z12.4 SCREENING FOR MALIGNANT NEOPLASM OF CERVIX: ICD-10-CM

## 2024-11-25 DIAGNOSIS — I77.810 AORTIC ROOT DILATATION (H): ICD-10-CM

## 2024-11-25 DIAGNOSIS — Z79.899 MEDICATION MANAGEMENT: ICD-10-CM

## 2024-11-25 DIAGNOSIS — Z86.16 HISTORY OF COVID-19: ICD-10-CM

## 2024-11-25 DIAGNOSIS — Z13.0 SCREENING FOR DEFICIENCY ANEMIA: ICD-10-CM

## 2024-11-25 DIAGNOSIS — G47.33 OSA (OBSTRUCTIVE SLEEP APNEA): ICD-10-CM

## 2024-11-25 DIAGNOSIS — Q23.81 BICUSPID AORTIC VALVE: ICD-10-CM

## 2024-11-25 LAB
ANION GAP SERPL CALCULATED.3IONS-SCNC: 8 MMOL/L (ref 7–15)
BUN SERPL-MCNC: 26.9 MG/DL (ref 8–23)
CALCIUM SERPL-MCNC: 9.7 MG/DL (ref 8.8–10.4)
CHLORIDE SERPL-SCNC: 102 MMOL/L (ref 98–107)
CHOLEST SERPL-MCNC: 186 MG/DL
CREAT SERPL-MCNC: 0.83 MG/DL (ref 0.51–0.95)
EGFRCR SERPLBLD CKD-EPI 2021: 77 ML/MIN/1.73M2
ERYTHROCYTE [DISTWIDTH] IN BLOOD BY AUTOMATED COUNT: 13.1 % (ref 10–15)
EST. AVERAGE GLUCOSE BLD GHB EST-MCNC: 114 MG/DL
FASTING STATUS PATIENT QL REPORTED: YES
FASTING STATUS PATIENT QL REPORTED: YES
FERRITIN SERPL-MCNC: 117 NG/ML (ref 11–328)
GLUCOSE SERPL-MCNC: 80 MG/DL (ref 70–99)
HBA1C MFR BLD: 5.6 % (ref 0–5.6)
HCO3 SERPL-SCNC: 28 MMOL/L (ref 22–29)
HCT VFR BLD AUTO: 36.2 % (ref 35–47)
HDLC SERPL-MCNC: 80 MG/DL
HGB BLD-MCNC: 11.9 G/DL (ref 11.7–15.7)
HPV HR 12 DNA CVX QL NAA+PROBE: NEGATIVE
HPV16 DNA CVX QL NAA+PROBE: NEGATIVE
HPV18 DNA CVX QL NAA+PROBE: NEGATIVE
HUMAN PAPILLOMA VIRUS FINAL DIAGNOSIS: NORMAL
LDLC SERPL CALC-MCNC: 94 MG/DL
MCH RBC QN AUTO: 30.7 PG (ref 26.5–33)
MCHC RBC AUTO-ENTMCNC: 32.9 G/DL (ref 31.5–36.5)
MCV RBC AUTO: 94 FL (ref 78–100)
NONHDLC SERPL-MCNC: 106 MG/DL
PLATELET # BLD AUTO: 158 10E3/UL (ref 150–450)
POTASSIUM SERPL-SCNC: 4.6 MMOL/L (ref 3.4–5.3)
RBC # BLD AUTO: 3.87 10E6/UL (ref 3.8–5.2)
SODIUM SERPL-SCNC: 138 MMOL/L (ref 135–145)
TRIGL SERPL-MCNC: 58 MG/DL
TSH SERPL DL<=0.005 MIU/L-ACNC: 2.39 UIU/ML (ref 0.3–4.2)
WBC # BLD AUTO: 3 10E3/UL (ref 4–11)

## 2024-11-25 PROCEDURE — 82728 ASSAY OF FERRITIN: CPT | Mod: GZ | Performed by: INTERNAL MEDICINE

## 2024-11-25 PROCEDURE — 80061 LIPID PANEL: CPT | Performed by: INTERNAL MEDICINE

## 2024-11-25 PROCEDURE — 84443 ASSAY THYROID STIM HORMONE: CPT | Performed by: INTERNAL MEDICINE

## 2024-11-25 PROCEDURE — G0439 PPPS, SUBSEQ VISIT: HCPCS | Performed by: INTERNAL MEDICINE

## 2024-11-25 PROCEDURE — 85027 COMPLETE CBC AUTOMATED: CPT | Performed by: INTERNAL MEDICINE

## 2024-11-25 PROCEDURE — 99214 OFFICE O/P EST MOD 30 MIN: CPT | Mod: 25 | Performed by: INTERNAL MEDICINE

## 2024-11-25 PROCEDURE — 36415 COLL VENOUS BLD VENIPUNCTURE: CPT | Performed by: INTERNAL MEDICINE

## 2024-11-25 PROCEDURE — 83036 HEMOGLOBIN GLYCOSYLATED A1C: CPT | Performed by: INTERNAL MEDICINE

## 2024-11-25 PROCEDURE — 80048 BASIC METABOLIC PNL TOTAL CA: CPT | Performed by: INTERNAL MEDICINE

## 2024-11-25 PROCEDURE — 87624 HPV HI-RISK TYP POOLED RSLT: CPT | Mod: GZ | Performed by: INTERNAL MEDICINE

## 2024-11-25 RX ORDER — LISINOPRIL 10 MG/1
10 TABLET ORAL DAILY
Qty: 90 TABLET | Refills: 2 | Status: SHIPPED | OUTPATIENT
Start: 2024-11-25

## 2024-11-25 RX ORDER — LEVOTHYROXINE SODIUM 50 UG/1
50 TABLET ORAL DAILY
Qty: 90 TABLET | Refills: 3 | Status: SHIPPED | OUTPATIENT
Start: 2024-11-25 | End: 2024-11-25

## 2024-11-25 RX ORDER — ROSUVASTATIN CALCIUM 5 MG/1
5 TABLET, COATED ORAL DAILY
Qty: 90 TABLET | Refills: 3 | Status: SHIPPED | OUTPATIENT
Start: 2024-11-25

## 2024-11-25 RX ORDER — LEVOTHYROXINE SODIUM 50 UG/1
50 TABLET ORAL DAILY
Qty: 90 TABLET | Refills: 1 | Status: SHIPPED | OUTPATIENT
Start: 2024-11-25

## 2024-11-25 RX ORDER — ALENDRONATE SODIUM 70 MG/1
70 TABLET ORAL
Qty: 12 TABLET | Refills: 3 | Status: SHIPPED | OUTPATIENT
Start: 2024-11-25

## 2024-11-25 ASSESSMENT — PAIN SCALES - GENERAL: PAINLEVEL_OUTOF10: NO PAIN (0)

## 2024-11-25 NOTE — PATIENT INSTRUCTIONS
Continue fosamax  Take this medicine by mouth first thing in the morning, after you are up for the day. Do not eat or drink anything before you take your medicine. Swallow the tablet with a full glass (6 to 8 fluid ounces) of plain water. Do not take this medicine with any other drink. Do not chew or crush the tablet. After taking this medicine, do not eat breakfast, drink, or take any medicines or vitamins for at least 30 minutes. Sit or stand up for at least 30 minutes after you take this medicine; do not lie down. Do not take your medicine more often than directed.    Labs today  Labs in 6 months    Follow up in one year for physical   Seek sooner medical attention if there is any worsening of symptoms or problems.     Patient Education   Preventive Care Advice   This is general advice given by our system to help you stay healthy. However, your care team may have specific advice just for you. Please talk to your care team about your preventive care needs.  Nutrition  Eat 5 or more servings of fruits and vegetables each day.  Try wheat bread, brown rice and whole grain pasta (instead of white bread, rice, and pasta).  Get enough calcium and vitamin D. Check the label on foods and aim for 100% of the RDA (recommended daily allowance).  Lifestyle  Exercise at least 150 minutes each week  (30 minutes a day, 5 days a week).  Do muscle strengthening activities 2 days a week. These help control your weight and prevent disease.  No smoking.  Wear sunscreen to prevent skin cancer.  Have a dental exam and cleaning every 6 months.  Yearly exams  See your health care team every year to talk about:  Any changes in your health.  Any medicines your care team has prescribed.  Preventive care, family planning, and ways to prevent chronic diseases.  Shots (vaccines)   HPV shots (up to age 26), if you've never had them before.  Hepatitis B shots (up to age 59), if you've never had them before.  COVID-19 shot: Get this shot when  it's due.  Flu shot: Get a flu shot every year.  Tetanus shot: Get a tetanus shot every 10 years.  Pneumococcal, hepatitis A, and RSV shots: Ask your care team if you need these based on your risk.  Shingles shot (for age 50 and up)  General health tests  Diabetes screening:  Starting at age 35, Get screened for diabetes at least every 3 years.  If you are younger than age 35, ask your care team if you should be screened for diabetes.  Cholesterol test: At age 39, start having a cholesterol test every 5 years, or more often if advised.  Bone density scan (DEXA): At age 50, ask your care team if you should have this scan for osteoporosis (brittle bones).  Hepatitis C: Get tested at least once in your life.  STIs (sexually transmitted infections)  Before age 24: Ask your care team if you should be screened for STIs.  After age 24: Get screened for STIs if you're at risk. You are at risk for STIs (including HIV) if:  You are sexually active with more than one person.  You don't use condoms every time.  You or a partner was diagnosed with a sexually transmitted infection.  If you are at risk for HIV, ask about PrEP medicine to prevent HIV.  Get tested for HIV at least once in your life, whether you are at risk for HIV or not.  Cancer screening tests  Cervical cancer screening: If you have a cervix, begin getting regular cervical cancer screening tests starting at age 21.  Breast cancer scan (mammogram): If you've ever had breasts, begin having regular mammograms starting at age 40. This is a scan to check for breast cancer.  Colon cancer screening: It is important to start screening for colon cancer at age 45.  Have a colonoscopy test every 10 years (or more often if you're at risk) Or, ask your provider about stool tests like a FIT test every year or Cologuard test every 3 years.  To learn more about your testing options, visit:   .  For help making a decision, visit:   https://bit.ly/fa20770.  Prostate cancer  screening test: If you have a prostate, ask your care team if a prostate cancer screening test (PSA) at age 55 is right for you.  Lung cancer screening: If you are a current or former smoker ages 50 to 80, ask your care team if ongoing lung cancer screenings are right for you.  For informational purposes only. Not to replace the advice of your health care provider. Copyright   2023 [facility]. All rights reserved. Clinically reviewed by the [facility]. eduFire [id number] - REV 01/24.  Learning About Stress  What is stress?     Stress is your body's response to a hard situation. Your body can have a physical, emotional, or mental response. Stress is a fact of life for most people, and it affects everyone differently. What causes stress for you may not be stressful for someone else.  A lot of things can cause stress. You may feel stress when you go on a job interview, take a test, or run a race. This kind of short-term stress is normal and even useful. It can help you if you need to work hard or react quickly. For example, stress can help you finish an important job on time.  Long-term stress is caused by ongoing stressful situations or events. Examples of long-term stress include long-term health problems, ongoing problems at work, or conflicts in your family. Long-term stress can harm your health.  How does stress affect your health?  When you are stressed, your body responds as though you are in danger. It makes hormones that speed up your heart, make you breathe faster, and give you a burst of energy. This is called the fight-or-flight stress response. If the stress is over quickly, your body goes back to normal and no harm is done.  But if stress happens too often or lasts too long, it can have bad effects. Long-term stress can make you more likely to get sick, and it can make symptoms of some diseases worse. If you tense up when you are stressed, you may develop neck,  shoulder, or low back pain. Stress is linked to high blood pressure and heart disease.  Stress also harms your emotional health. It can make you frye, tense, or depressed. Your relationships may suffer, and you may not do well at work or school.  What can you do to manage stress?  You can try these things to help manage stress:   Do something active. Exercise or activity can help reduce stress. Walking is a great way to get started. Even everyday activities such as housecleaning or yard work can help.  Try yoga or perez chi. These techniques combine exercise and meditation. You may need some training at first to learn them.  Do something you enjoy. For example, listen to music or go to a movie. Practice your hobby or do volunteer work.  Meditate. This can help you relax, because you are not worrying about what happened before or what may happen in the future.  Do guided imagery. Imagine yourself in any setting that helps you feel calm. You can use online videos, books, or a teacher to guide you.  Do breathing exercises. For example:  From a standing position, bend forward from the waist with your knees slightly bent. Let your arms dangle close to the floor.  Breathe in slowly and deeply as you return to a standing position. Roll up slowly and lift your head last.  Hold your breath for just a few seconds in the standing position.  Breathe out slowly and bend forward from the waist.  Let your feelings out. Talk, laugh, cry, and express anger when you need to. Talking with supportive friends or family, a counselor, or a sylvie leader about your feelings is a healthy way to relieve stress. Avoid discussing your feelings with people who make you feel worse.  Write. It may help to write about things that are bothering you. This helps you find out how much stress you feel and what is causing it. When you know this, you can find better ways to cope.  What can you do to prevent stress?  You might try some of these things to  "help prevent stress:  Manage your time. This helps you find time to do the things you want and need to do.  Get enough sleep. Your body recovers from the stresses of the day while you are sleeping.  Get support. Your family, friends, and community can make a difference in how you experience stress.  Limit your news feed. Avoid or limit time on social media or news that may make you feel stressed.  Do something active. Exercise or activity can help reduce stress. Walking is a great way to get started.  Where can you learn more?  Go to https://www.ROSTR.net/patiented  Enter N032 in the search box to learn more about \"Learning About Stress.\"  Current as of: October 24, 2023  Content Version: 14.2 2024 [employer]).   Care instructions adapted under license by your healthcare professional. If you have questions about a medical condition or this instruction, always ask your healthcare professional. [employer] disclaims any warranty or liability for your use of this information.    Learning About Sleeping Well  What does sleeping well mean?     Sleeping well means getting enough sleep to feel good and stay healthy. How much sleep is enough varies among people.  The number of hours you sleep and how you feel when you wake up are both important. If you do not feel refreshed, you probably need more sleep. Another sign of not getting enough sleep is feeling tired during the day.  Experts recommend that adults get at least 7 or more hours of sleep per day. Children and older adults need more sleep.  Why is getting enough sleep important?  Getting enough quality sleep is a basic part of good health. When your sleep suffers, your physical health, mood, and your thoughts can suffer too. You may find yourself feeling more grumpy or stressed. Not getting enough sleep also can lead to serious problems, including injury, accidents, anxiety, and depression.  What might cause poor sleeping?  Many things " "can cause sleep problems, including:  Changes to your sleep schedule.  Stress. Stress can be caused by fear about a single event, such as giving a speech. Or you may have ongoing stress, such as worry about work or school.  Depression, anxiety, and other mental or emotional conditions.  Changes in your sleep habits or surroundings. This includes changes that happen where you sleep, such as noise, light, or sleeping in a different bed. It also includes changes in your sleep pattern, such as having jet lag or working a late shift.  Health problems, such as pain, breathing problems, and restless legs syndrome.  Lack of regular exercise.  Using alcohol, nicotine, or caffeine before bed.  How can you help yourself?  Here are some tips that may help you sleep more soundly and wake up feeling more refreshed.  Your sleeping area   Use your bedroom only for sleeping and sex. A bit of light reading may help you fall asleep. But if it doesn't, do your reading elsewhere in the house. Try not to use your TV, computer, smartphone, or tablet while you are in bed.  Be sure your bed is big enough to stretch out comfortably, especially if you have a sleep partner.  Keep your bedroom quiet, dark, and cool. Use curtains, blinds, or a sleep mask to block out light. To block out noise, use earplugs, soothing music, or a \"white noise\" machine.  Your evening and bedtime routine   Create a relaxing bedtime routine. You might want to take a warm shower or bath, or listen to soothing music.  Go to bed at the same time every night. And get up at the same time every morning, even if you feel tired.  What to avoid   Limit caffeine (coffee, tea, caffeinated sodas) during the day, and don't have any for at least 6 hours before bedtime.  Avoid drinking alcohol before bedtime. Alcohol can cause you to wake up more often during the night.  Try not to smoke or use tobacco, especially in the evening. Nicotine can keep you awake.  Limit naps during the " "day, especially close to bedtime.  Avoid lying in bed awake for too long. If you can't fall asleep or if you wake up in the middle of the night and can't get back to sleep within about 20 minutes, get out of bed and go to another room until you feel sleepy.  Avoid taking medicine right before bed that may keep you awake or make you feel hyper or energized. Your doctor can tell you if your medicine may do this and if you can take it earlier in the day.  If you can't sleep   Imagine yourself in a peaceful, pleasant scene. Focus on the details and feelings of being in a place that is relaxing.  Get up and do a quiet or boring activity until you feel sleepy.  Avoid drinking any liquids before going to bed to help prevent waking up often to use the bathroom.  Where can you learn more?  Go to https://www.[employer].net/patiented  Enter J942 in the search box to learn more about \"Learning About Sleeping Well.\"  Current as of: July 10, 2023  Content Version: 14.2 2024 [facility].   Care instructions adapted under license by your healthcare professional. If you have questions about a medical condition or this instruction, always ask your healthcare professional. [employer] disclaims any warranty or liability for your use of this information.       "

## 2024-11-25 NOTE — PROGRESS NOTES
Preventive Care Visit  [facility]  Angelica Marcano MD, Internal Medicine  Nov 25, 2024      Assessment & Plan     Diane was seen today for physical.    Diagnoses and all orders for this visit:    Encounter for Medicare annual wellness exam  Last colonoscopy done in 2020, patient is awaiting next available appointment.  Last DEXA 11/29/2023 which showed osteopenia.  Last mammo done 11/04/2024.  Last pap was 10/29/2021 which was normal. Recommended 3 year follow-up.   Counseled on diet and exercise.  Preventive health counseling was also done.      BHUMIKA (obstructive sleep apnea)  On CPAP.    Hypothyroidism, unspecified type  -     TSH with free T4 reflex; Future  -     levothyroxine (SYNTHROID/LEVOTHROID) 50 MCG tablet; Take 1 tablet (50 mcg) by mouth daily.  -     TSH with free T4 reflex; Future  On levothyroxine.    will report to lab after today's visit.    Bicuspid aortic valve  Comments:  last echo on 08/2023  Follow's with cardiology. Last ECHO 08/2023.    Osteopenia with high risk of fracture  -     alendronate (FOSAMAX) 70 MG tablet; Take 1 tablet (70 mg) by mouth every 7 days.  On alendronate, started in 2021. She reports adequate vitamin D and calcium intake.   Was seen by endocrinologist     Leukopenia, unspecified type  -     CBC with platelets and differential; Future   will report to lab after today's visit.  We check CBC every 6 months   She has seen hematologist also    Aortic root dilatation (H)  Follow's with cardiology.  Last Echocardiogram in 2023    Thrombocytopenia (H)  Has improved   Will recheck    Hyperlipidemia, unspecified hyperlipidemia type  -     Lipid panel reflex to direct LDL Fasting; Future  -     rosuvastatin (CRESTOR) 5 MG tablet; Take 1 tablet (5 mg) by mouth daily. for cholesterol  On rosuvastatin.    Nonrheumatic aortic valve insufficiency  Comments:  follows cardiology  Orders:  -     lisinopril (ZESTRIL) 10 MG tablet; Take 1 tablet (10 mg) by mouth daily.  "for blood pressure  On lisinopril. She reports her home readings are under good control. She denies lightheadedness.     Screening for deficiency anemia  -     Ferritin; Future  -     CBC with platelets; Future      Elevated glucose  -     Hemoglobin A1c; Future       Medication management  -     Basic metabolic panel  (Ca, Cl, CO2, Creat, Gluc, K, Na, BUN); Future      History of COVID-19  She states she had Covid-19 2 months ago. She denies ongoing symptoms.     Screening for malignant neoplasm of cervix  -     HPV and Gynecologic Cytology Panel - Recommended Age 30-65 Years  She is interested in pap smear follow-up due to her history of cervical cancer. She is s/p vaginal hysterectomy in 1991 due to cervical cancer.    History of cervical cancer  -     HPV and Gynecologic Cytology Panel - Recommended Age 30-65 Years  See above note.    Other orders  -     REVIEW OF HEALTH MAINTENANCE PROTOCOL ORDERS      BMI  Estimated body mass index is 25.43 kg/m  as calculated from the following:    Height as of this encounter: 1.66 m (5' 5.35\").    Weight as of this encounter: 70.1 kg (154 lb 8 oz).   Weight management plan: Discussed healthy diet and exercise guidelines    Counseling  Appropriate preventive services were addressed with this patient via screening, questionnaire, or discussion as appropriate for fall prevention, nutrition, physical activity, Tobacco-use cessation, social engagement, weight loss and cognition.  Checklist reviewing preventive services available has been given to the patient.  Reviewed patient's diet, addressing concerns and/or questions.   She is at risk for psychosocial distress and has been provided with information to reduce risk.   Discussed possible causes of fatigue.       Subjective   Diane is a 64 year old, presenting for the following:  Physical          HPI  Diane is a 64 year old, presenting for the following:  Physical    Health Care Directive  Patient does not have a Health Care " Directive: Discussed advance care planning with patient; however, patient declined at this time.      11/20/2024   General Health   How would you rate your overall physical health? Good   Feel stress (tense, anxious, or unable to sleep) To some extent      (!) STRESS CONCERN      11/20/2024   Nutrition   Diet: Regular (no restrictions)            11/20/2024   Exercise   Days per week of moderate/strenous exercise 6 days   Average minutes spent exercising at this level 50 min            11/20/2024   Social Factors   Frequency of gathering with friends or relatives Twice a week   Worry food won't last until get money to buy more No   Food not last or not have enough money for food? No   Do you have housing? (Housing is defined as stable permanent housing and does not include staying ouside in a car, in a tent, in an abandoned building, in an overnight shelter, or couch-surfing.) Yes   Are you worried about losing your housing? No   Lack of transportation? No   Unable to get utilities (heat,electricity)? No            11/20/2024   Fall Risk   Fallen 2 or more times in the past year? No    Trouble with walking or balance? No        Patient-reported          11/20/2024   Activities of Daily Living- Home Safety   Needs help with the following daily activites None of the above   Safety concerns in the home None of the above            11/20/2024   Dental   Dentist two times every year? Yes            11/20/2024   Hearing Screening   Hearing concerns? None of the above            11/20/2024   Driving Risk Screening   Patient/family members have concerns about driving No            11/20/2024   General Alertness/Fatigue Screening   Have you been more tired than usual lately? (!) YES            11/20/2024   Urinary Incontinence Screening   Bothered by leaking urine in past 6 months No            11/20/2024   TB Screening   Were you born outside of the [place]? No            Today's PHQ-2 Score:       11/25/2024     6:50 AM   PHQ-2  ( 1999 Pf[employer])   Q1: Little interest or pleasure in doing things 0    Q2: Feeling down, depressed or hopeless 0    PHQ-2 Score 0    Q1: Little interest or pleasure in doing things Not at all   Q2: Feeling down, depressed or hopeless Not at all   PHQ-2 Score 0       Patient-reported           11/20/2024   Substance Use   Alcohol more than 3/day or more than 7/wk Not Applicable   Do you have a current opioid prescription? No   How severe/bad is pain from 1 to 10? 0/10 (No Pain)   Do you use any other substances recreationally? No        Social History     Tobacco Use    Smoking status: Never     Passive exposure: Never    Smokeless tobacco: Never   Vaping Use    Vaping status: Never Used   Substance Use Topics    Alcohol use: No    Drug use: No           11/4/2024   LAST FHS-7 RESULTS   1st degree relative breast or ovarian cancer No   Any relative bilateral breast cancer No   Any male have breast cancer No   Any ONE woman have BOTH breast AND ovarian cancer No   Any woman with breast cancer before 50yrs No   2 or more relatives with breast AND/OR ovarian cancer No   2 or more relatives with breast AND/OR bowel cancer No           Mammogram Screening - Mammogram every 1-2 years updated in Health Maintenance based on mutual decision making      History of abnormal Pap smear: Hx of cervical cancer.        Latest Ref Rng & Units 10/29/2021     7:22 AM 9/21/2018    11:12 AM 9/21/2018    10:45 AM   PAP / HPV   PAP  Negative for Intraepithelial Lesion or Malignancy (NILM)      PAP (Historical)    NIL    HPV 16 DNA Negative Negative  Negative     HPV 18 DNA Negative Negative  Negative     Other HR HPV Negative Negative  Negative       ASCVD Risk   The 10-year ASCVD risk score (Jayla QUAN, et al., 2019) is: 4.1%    Values used to calculate the score:      Age: 64 years      Sex: Female      Is Non- : No      Diabetic: No      Tobacco smoker: No      Systolic Blood Pressure: 111 mmHg      Is BP  "treated: No      HDL Cholesterol: 72 mg/dL      Total Cholesterol: 185 mg/dL          Reviewed and updated as needed this visit by Provider                    Current providers sharing in care for this patient include:  Patient Care Team:  Angelica Marcano MD as PCP - General (Internal Medicine)  Angelica Marcano MD as Assigned PCP  Jasmeet John MD as Assigned Heart and Vascular Provider  Sloane Cuenca MD as Hospitalist (Endocrinology, Diabetes, and Metabolism)  Sloane Cuenca MD as Assigned Endocrinology Provider    The following health maintenance items are reviewed in Epic and correct as of today:  Health Maintenance   Topic Date Due    LIPID  11/20/2024    TSH W/FREE T4 REFLEX  11/20/2024    COLORECTAL CANCER SCREENING  02/12/2025    DTAP/TDAP/TD IMMUNIZATION (4 - Td or Tdap) 05/31/2025    MEDICARE ANNUAL WELLNESS VISIT  11/25/2025    ANNUAL REVIEW OF HM ORDERS  11/25/2025    FALL RISK ASSESSMENT  11/25/2025    MAMMO SCREENING  11/04/2026    GLUCOSE  11/20/2026    ADVANCE CARE PLANNING  11/25/2029    DEXA  11/27/2038    HEPATITIS C SCREENING  Completed    PHQ-2 (once per calendar year)  Completed    INFLUENZA VACCINE  Completed    Pneumococcal Vaccine: 65+ Years  Completed    ZOSTER IMMUNIZATION  Completed    RSV VACCINE  Completed    COVID-19 Vaccine  Completed    HPV IMMUNIZATION  Aged Out    MENINGITIS IMMUNIZATION  Aged Out    RSV MONOCLONAL ANTIBODY  Aged Out    PAP  Discontinued         Review of Systems  Constitutional, HEENT, cardiovascular, pulmonary, GI, , musculoskeletal, neuro, skin, endocrine and psych systems are negative, except as otherwise noted.       Objective    Exam  /66 (BP Location: Right arm, Patient Position: Sitting, Cuff Size: Adult Regular)   Pulse 64   Temp 97.4  F (36.3  C) (Oral)   Resp 16   Ht 1.66 m (5' 5.35\")   Wt 70.1 kg (154 lb 8 oz)   LMP  (LMP Unknown)   SpO2 100%   BMI 25.43 kg/m     Estimated body mass index is 25.43 kg/m  as " "calculated from the following:    Height as of this encounter: 1.66 m (5' 5.35\").    Weight as of this encounter: 70.1 kg (154 lb 8 oz).    Physical Exam    GENERAL APPEARANCE: healthy, alert and no distress  EYES: eyes grossly normal to inspection, PERRL and conjunctivae and sclerae normal  HENT: ear canals and TM's normal, nose and mouth without ulcers or lesions, oropharynx clear and oral mucous membranes moist  NECK: no adenopathy,   RESP: lungs clear to auscultation - no rales, rhonchi or wheezes  BREAST: normal without masses, tenderness or nipple discharge and no palpable axillary masses or adenopathy  CV: regular rate and rhythm, normal S1 S2, no S3   ABDOMEN: soft, nontender, no hepatosplenomegaly, no masses and bowel sounds normal  : Pap smear performed, no abnormal masses felt   Vagina was very dry  MS: no musculoskeletal defects are noted and gait is age appropriate without ataxia, high arch feet  SKIN: no suspicious lesions or rashes, scar of surgery below the umbilicus d/t hysterectomy, scar of appendectomy  NEURO: normal, mentation intact and speech normal  PSYCH: mentation appears normal and affect normal/bright           11/25/2024   Mini Cog   Clock Draw Score 2 Normal   3 Item Recall 2 objects recalled   Mini Cog Total Score 4                 Signed Electronically by: Angelica Marcano MD  This document serves as a record of the services and decisions personally performed and made by Dr. Marcano. It was created on her behalf by Priyanka Manzanares, a trained medical scribe. The creation of this document is based the provider's statements to the medical scribe.    "

## 2024-11-26 NOTE — RESULT ENCOUNTER NOTE
Carina Contreras    This is to inform you regarding your test result.    Basic metabolic panel which includes electrolytes and kidney fucntion is satisfactory .  White count is low but numbers are stable   Recheck in 6 months   Your total cholesterol is normal.  HDL which is called good cholesterol is normal.  Your LDL cholesterol is normal.  This is often call bad cholesterol and high levels increase the risk for heart attacks and strokes.  Your triglycerides are normal.  Your total cholesterol is normal.  HDL which is called good cholesterol is normal.  Your LDL cholesterol is normal.  This is often call bad cholesterol and high levels increase the risk for heart attacks and strokes.  Your triglycerides are normal.  HbA1c which is average glucose during last 3 months is normal.  TSH which is thyroid hormone is normal.  Stay on current dose of levothyroxine.  Recheck TSH in 6 months            Sincerely,      Dr.Nasima Jeet MD,FACP

## 2025-02-27 ENCOUNTER — TRANSFERRED RECORDS (OUTPATIENT)
Dept: HEALTH INFORMATION MANAGEMENT | Facility: CLINIC | Age: 65
End: 2025-02-27
Payer: COMMERCIAL

## 2025-03-05 PROBLEM — D12.6 COLON ADENOMAS: Status: ACTIVE | Noted: 2025-03-05

## 2025-05-13 ENCOUNTER — MYC REFILL (OUTPATIENT)
Dept: FAMILY MEDICINE | Facility: CLINIC | Age: 65
End: 2025-05-13
Payer: COMMERCIAL

## 2025-05-13 DIAGNOSIS — R11.0 NAUSEA: ICD-10-CM

## 2025-05-13 RX ORDER — SCOPOLAMINE 1 MG/3D
PATCH, EXTENDED RELEASE TRANSDERMAL
Qty: 10 PATCH | Refills: 1 | Status: SHIPPED | OUTPATIENT
Start: 2025-05-13

## 2025-05-28 ENCOUNTER — RESULTS FOLLOW-UP (OUTPATIENT)
Dept: FAMILY MEDICINE | Facility: CLINIC | Age: 65
End: 2025-05-28

## 2025-05-28 ENCOUNTER — LAB (OUTPATIENT)
Dept: LAB | Facility: CLINIC | Age: 65
End: 2025-05-28
Payer: COMMERCIAL

## 2025-05-28 DIAGNOSIS — E03.9 HYPOTHYROIDISM, UNSPECIFIED TYPE: ICD-10-CM

## 2025-05-28 DIAGNOSIS — D72.819 LEUKOPENIA, UNSPECIFIED TYPE: ICD-10-CM

## 2025-05-28 LAB
BASOPHILS # BLD AUTO: 0 10E3/UL (ref 0–0.2)
BASOPHILS NFR BLD AUTO: 1 %
EOSINOPHIL # BLD AUTO: 0.1 10E3/UL (ref 0–0.7)
EOSINOPHIL NFR BLD AUTO: 3 %
ERYTHROCYTE [DISTWIDTH] IN BLOOD BY AUTOMATED COUNT: 12.9 % (ref 10–15)
HCT VFR BLD AUTO: 36.8 % (ref 35–47)
HGB BLD-MCNC: 11.8 G/DL (ref 11.7–15.7)
IMM GRANULOCYTES # BLD: 0 10E3/UL
IMM GRANULOCYTES NFR BLD: 0 %
LYMPHOCYTES # BLD AUTO: 1.2 10E3/UL (ref 0.8–5.3)
LYMPHOCYTES NFR BLD AUTO: 36 %
MCH RBC QN AUTO: 30.4 PG (ref 26.5–33)
MCHC RBC AUTO-ENTMCNC: 32.1 G/DL (ref 31.5–36.5)
MCV RBC AUTO: 95 FL (ref 78–100)
MONOCYTES # BLD AUTO: 0.4 10E3/UL (ref 0–1.3)
MONOCYTES NFR BLD AUTO: 12 %
NEUTROPHILS # BLD AUTO: 1.7 10E3/UL (ref 1.6–8.3)
NEUTROPHILS NFR BLD AUTO: 49 %
PLATELET # BLD AUTO: 169 10E3/UL (ref 150–450)
RBC # BLD AUTO: 3.88 10E6/UL (ref 3.8–5.2)
TSH SERPL DL<=0.005 MIU/L-ACNC: 2.07 UIU/ML (ref 0.3–4.2)
WBC # BLD AUTO: 3.4 10E3/UL (ref 4–11)

## 2025-05-28 PROCEDURE — 84443 ASSAY THYROID STIM HORMONE: CPT

## 2025-05-28 PROCEDURE — 85025 COMPLETE CBC W/AUTO DIFF WBC: CPT

## 2025-05-28 PROCEDURE — 36415 COLL VENOUS BLD VENIPUNCTURE: CPT

## 2025-05-28 NOTE — RESULT ENCOUNTER NOTE
Carina Contreras    This is to inform you regarding your test result.    White count is low but numbers have been stable  Recheck in 6 months   TSH which is thyroid hormone is normal.    Sincerely,      Dr.Nasima Jeet MD,FACP

## 2025-07-16 ENCOUNTER — TELEPHONE (OUTPATIENT)
Dept: FAMILY MEDICINE | Facility: CLINIC | Age: 65
End: 2025-07-16
Payer: COMMERCIAL

## 2025-07-16 NOTE — TELEPHONE ENCOUNTER
Reason for Call:  Appointment Request    Patient requesting this type of appt:  Preventive     Requested provider: Angelica Marcano    Reason patient unable to be scheduled: Not within requested timeframe    When does patient want to be seen/preferred time: 12/11/2025 AND LATER     Comments: PT WOULD LIKE AN ANNUAL SOONER THAN WHAT'S AVAILABLE.     Could we send this information to you in [facility] or would you prefer to receive a phone call?:   Patient would prefer a phone call OR [facility]     Call taken on 7/16/2025 at 1:11 PM by Radha Lauren